# Patient Record
Sex: FEMALE | Race: ASIAN | NOT HISPANIC OR LATINO | URBAN - METROPOLITAN AREA
[De-identification: names, ages, dates, MRNs, and addresses within clinical notes are randomized per-mention and may not be internally consistent; named-entity substitution may affect disease eponyms.]

---

## 2017-08-28 ENCOUNTER — INPATIENT (INPATIENT)
Facility: HOSPITAL | Age: 42
LOS: 3 days | Discharge: HOME | DRG: 872 | End: 2017-09-01
Attending: HOSPITALIST | Admitting: INTERNAL MEDICINE
Payer: COMMERCIAL

## 2017-08-28 VITALS
HEART RATE: 107 BPM | OXYGEN SATURATION: 100 % | RESPIRATION RATE: 16 BRPM | TEMPERATURE: 100 F | DIASTOLIC BLOOD PRESSURE: 71 MMHG | WEIGHT: 130.07 LBS | SYSTOLIC BLOOD PRESSURE: 107 MMHG

## 2017-08-28 DIAGNOSIS — Z29.9 ENCOUNTER FOR PROPHYLACTIC MEASURES, UNSPECIFIED: ICD-10-CM

## 2017-08-28 DIAGNOSIS — N12 TUBULO-INTERSTITIAL NEPHRITIS, NOT SPECIFIED AS ACUTE OR CHRONIC: ICD-10-CM

## 2017-08-28 DIAGNOSIS — E66.3 OVERWEIGHT: ICD-10-CM

## 2017-08-28 DIAGNOSIS — A41.9 SEPSIS, UNSPECIFIED ORGANISM: ICD-10-CM

## 2017-08-28 LAB
ALBUMIN SERPL ELPH-MCNC: 2.5 G/DL — LOW (ref 3.3–5)
ALP SERPL-CCNC: 103 U/L — SIGNIFICANT CHANGE UP (ref 40–120)
ALT FLD-CCNC: 31 U/L — SIGNIFICANT CHANGE UP (ref 12–78)
AMYLASE P1 CFR SERPL: 20 U/L — LOW (ref 25–115)
ANION GAP SERPL CALC-SCNC: 11 MMOL/L — SIGNIFICANT CHANGE UP (ref 5–17)
APPEARANCE UR: ABNORMAL
AST SERPL-CCNC: 21 U/L — SIGNIFICANT CHANGE UP (ref 15–37)
BILIRUB SERPL-MCNC: 0.3 MG/DL — SIGNIFICANT CHANGE UP (ref 0.2–1.2)
BILIRUB UR-MCNC: NEGATIVE — SIGNIFICANT CHANGE UP
BUN SERPL-MCNC: 6 MG/DL — LOW (ref 7–23)
CALCIUM SERPL-MCNC: 8.2 MG/DL — LOW (ref 8.5–10.1)
CHLORIDE SERPL-SCNC: 102 MMOL/L — SIGNIFICANT CHANGE UP (ref 96–108)
CK MB BLD-MCNC: <0.6 % — SIGNIFICANT CHANGE UP (ref 0–3.5)
CK MB CFR SERPL CALC: <0.5 NG/ML — SIGNIFICANT CHANGE UP (ref 0–3.6)
CK SERPL-CCNC: 79 U/L — SIGNIFICANT CHANGE UP (ref 26–192)
CO2 SERPL-SCNC: 23 MMOL/L — SIGNIFICANT CHANGE UP (ref 22–31)
COLOR SPEC: YELLOW — SIGNIFICANT CHANGE UP
CREAT SERPL-MCNC: 0.99 MG/DL — SIGNIFICANT CHANGE UP (ref 0.5–1.3)
DIFF PNL FLD: ABNORMAL
EOSINOPHIL NFR BLD AUTO: 1 % — SIGNIFICANT CHANGE UP (ref 0–6)
GLUCOSE SERPL-MCNC: 116 MG/DL — HIGH (ref 70–99)
GLUCOSE UR QL: NEGATIVE — SIGNIFICANT CHANGE UP
HCG SERPL-ACNC: <1 MIU/ML — SIGNIFICANT CHANGE UP
HCT VFR BLD CALC: 30.3 % — LOW (ref 34.5–45)
HCT VFR BLD CALC: 30.4 % — LOW (ref 34.5–45)
HGB BLD-MCNC: 10.2 G/DL — LOW (ref 11.5–15.5)
HGB BLD-MCNC: 10.2 G/DL — LOW (ref 11.5–15.5)
KETONES UR-MCNC: NEGATIVE — SIGNIFICANT CHANGE UP
LACTATE SERPL-SCNC: 1.6 MMOL/L — SIGNIFICANT CHANGE UP (ref 0.7–2)
LEUKOCYTE ESTERASE UR-ACNC: ABNORMAL
LIDOCAIN IGE QN: 119 U/L — SIGNIFICANT CHANGE UP (ref 73–393)
LYMPHOCYTES # BLD AUTO: 18 % — SIGNIFICANT CHANGE UP (ref 13–44)
MCHC RBC-ENTMCNC: 28.5 PG — SIGNIFICANT CHANGE UP (ref 27–34)
MCHC RBC-ENTMCNC: 28.7 PG — SIGNIFICANT CHANGE UP (ref 27–34)
MCHC RBC-ENTMCNC: 33.4 GM/DL — SIGNIFICANT CHANGE UP (ref 32–36)
MCHC RBC-ENTMCNC: 33.7 GM/DL — SIGNIFICANT CHANGE UP (ref 32–36)
MCV RBC AUTO: 85.1 FL — SIGNIFICANT CHANGE UP (ref 80–100)
MCV RBC AUTO: 85.4 FL — SIGNIFICANT CHANGE UP (ref 80–100)
MONOCYTES NFR BLD AUTO: 10 % — HIGH (ref 1–9)
NEUTROPHILS NFR BLD AUTO: 71 % — SIGNIFICANT CHANGE UP (ref 43–77)
NITRITE UR-MCNC: NEGATIVE — SIGNIFICANT CHANGE UP
PH UR: 8 — SIGNIFICANT CHANGE UP (ref 5–8)
PLATELET # BLD AUTO: 354 K/UL — SIGNIFICANT CHANGE UP (ref 150–400)
PLATELET # BLD AUTO: 357 K/UL — SIGNIFICANT CHANGE UP (ref 150–400)
POTASSIUM SERPL-MCNC: 4.4 MMOL/L — SIGNIFICANT CHANGE UP (ref 3.5–5.3)
POTASSIUM SERPL-SCNC: 4.4 MMOL/L — SIGNIFICANT CHANGE UP (ref 3.5–5.3)
PROCALCITONIN SERPL-MCNC: 0.14 NG/ML — HIGH (ref 0–0.04)
PROT SERPL-MCNC: 7.6 G/DL — SIGNIFICANT CHANGE UP (ref 6–8.3)
PROT UR-MCNC: NEGATIVE — SIGNIFICANT CHANGE UP
RAPID RVP RESULT: SIGNIFICANT CHANGE UP
RBC # BLD: 3.56 M/UL — LOW (ref 3.8–5.2)
RBC # BLD: 3.56 M/UL — LOW (ref 3.8–5.2)
RBC # FLD: 13.8 % — SIGNIFICANT CHANGE UP (ref 10.3–14.5)
RBC # FLD: 13.8 % — SIGNIFICANT CHANGE UP (ref 10.3–14.5)
SODIUM SERPL-SCNC: 136 MMOL/L — SIGNIFICANT CHANGE UP (ref 135–145)
SP GR SPEC: 1 — LOW (ref 1.01–1.02)
TROPONIN I SERPL-MCNC: <.015 NG/ML — SIGNIFICANT CHANGE UP (ref 0.01–0.04)
UROBILINOGEN FLD QL: NEGATIVE — SIGNIFICANT CHANGE UP
WBC # BLD: 19.9 K/UL — HIGH (ref 3.8–10.5)
WBC # BLD: 21.2 K/UL — HIGH (ref 3.8–10.5)
WBC # FLD AUTO: 19.9 K/UL — HIGH (ref 3.8–10.5)
WBC # FLD AUTO: 21.2 K/UL — HIGH (ref 3.8–10.5)

## 2017-08-28 PROCEDURE — 99223 1ST HOSP IP/OBS HIGH 75: CPT | Mod: AI,GC

## 2017-08-28 PROCEDURE — 93010 ELECTROCARDIOGRAM REPORT: CPT

## 2017-08-28 PROCEDURE — 71260 CT THORAX DX C+: CPT | Mod: 26

## 2017-08-28 PROCEDURE — 71010: CPT | Mod: 26

## 2017-08-28 PROCEDURE — 74177 CT ABD & PELVIS W/CONTRAST: CPT | Mod: 26

## 2017-08-28 PROCEDURE — 99285 EMERGENCY DEPT VISIT HI MDM: CPT

## 2017-08-28 RX ORDER — SODIUM CHLORIDE 9 MG/ML
1000 INJECTION INTRAMUSCULAR; INTRAVENOUS; SUBCUTANEOUS
Qty: 0 | Refills: 0 | Status: DISCONTINUED | OUTPATIENT
Start: 2017-08-28 | End: 2017-08-28

## 2017-08-28 RX ORDER — SODIUM CHLORIDE 9 MG/ML
1000 INJECTION INTRAMUSCULAR; INTRAVENOUS; SUBCUTANEOUS ONCE
Qty: 0 | Refills: 0 | Status: COMPLETED | OUTPATIENT
Start: 2017-08-28 | End: 2017-08-28

## 2017-08-28 RX ORDER — ONDANSETRON 8 MG/1
4 TABLET, FILM COATED ORAL ONCE
Qty: 0 | Refills: 0 | Status: COMPLETED | OUTPATIENT
Start: 2017-08-28 | End: 2017-08-28

## 2017-08-28 RX ORDER — CEFTRIAXONE 500 MG/1
1 INJECTION, POWDER, FOR SOLUTION INTRAMUSCULAR; INTRAVENOUS EVERY 24 HOURS
Qty: 0 | Refills: 0 | Status: DISCONTINUED | OUTPATIENT
Start: 2017-08-29 | End: 2017-08-31

## 2017-08-28 RX ORDER — ONDANSETRON 8 MG/1
4 TABLET, FILM COATED ORAL EVERY 6 HOURS
Qty: 0 | Refills: 0 | Status: DISCONTINUED | OUTPATIENT
Start: 2017-08-28 | End: 2017-09-01

## 2017-08-28 RX ORDER — ACETAMINOPHEN 500 MG
650 TABLET ORAL ONCE
Qty: 0 | Refills: 0 | Status: COMPLETED | OUTPATIENT
Start: 2017-08-28 | End: 2017-08-28

## 2017-08-28 RX ORDER — ACETAMINOPHEN 500 MG
650 TABLET ORAL EVERY 6 HOURS
Qty: 0 | Refills: 0 | Status: DISCONTINUED | OUTPATIENT
Start: 2017-08-28 | End: 2017-09-01

## 2017-08-28 RX ORDER — CEFTRIAXONE 500 MG/1
1 INJECTION, POWDER, FOR SOLUTION INTRAMUSCULAR; INTRAVENOUS ONCE
Qty: 0 | Refills: 0 | Status: COMPLETED | OUTPATIENT
Start: 2017-08-28 | End: 2017-08-28

## 2017-08-28 RX ORDER — ENOXAPARIN SODIUM 100 MG/ML
40 INJECTION SUBCUTANEOUS EVERY 24 HOURS
Qty: 0 | Refills: 0 | Status: DISCONTINUED | OUTPATIENT
Start: 2017-08-28 | End: 2017-09-01

## 2017-08-28 RX ORDER — SODIUM CHLORIDE 9 MG/ML
3 INJECTION INTRAMUSCULAR; INTRAVENOUS; SUBCUTANEOUS ONCE
Qty: 0 | Refills: 0 | Status: COMPLETED | OUTPATIENT
Start: 2017-08-28 | End: 2017-08-28

## 2017-08-28 RX ORDER — ONDANSETRON 8 MG/1
4 TABLET, FILM COATED ORAL EVERY 6 HOURS
Qty: 0 | Refills: 0 | Status: DISCONTINUED | OUTPATIENT
Start: 2017-08-28 | End: 2017-08-28

## 2017-08-28 RX ORDER — SODIUM CHLORIDE 9 MG/ML
1000 INJECTION INTRAMUSCULAR; INTRAVENOUS; SUBCUTANEOUS
Qty: 0 | Refills: 0 | Status: DISCONTINUED | OUTPATIENT
Start: 2017-08-28 | End: 2017-09-01

## 2017-08-28 RX ADMIN — Medication 650 MILLIGRAM(S): at 13:16

## 2017-08-28 RX ADMIN — SODIUM CHLORIDE 3 MILLILITER(S): 9 INJECTION INTRAMUSCULAR; INTRAVENOUS; SUBCUTANEOUS at 13:14

## 2017-08-28 RX ADMIN — SODIUM CHLORIDE 1000 MILLILITER(S): 9 INJECTION INTRAMUSCULAR; INTRAVENOUS; SUBCUTANEOUS at 18:42

## 2017-08-28 RX ADMIN — SODIUM CHLORIDE 120 MILLILITER(S): 9 INJECTION INTRAMUSCULAR; INTRAVENOUS; SUBCUTANEOUS at 22:05

## 2017-08-28 RX ADMIN — SODIUM CHLORIDE 1000 MILLILITER(S): 9 INJECTION INTRAMUSCULAR; INTRAVENOUS; SUBCUTANEOUS at 16:30

## 2017-08-28 RX ADMIN — SODIUM CHLORIDE 1000 MILLILITER(S): 9 INJECTION INTRAMUSCULAR; INTRAVENOUS; SUBCUTANEOUS at 12:46

## 2017-08-28 RX ADMIN — ONDANSETRON 4 MILLIGRAM(S): 8 TABLET, FILM COATED ORAL at 20:04

## 2017-08-28 RX ADMIN — CEFTRIAXONE 100 GRAM(S): 500 INJECTION, POWDER, FOR SOLUTION INTRAMUSCULAR; INTRAVENOUS at 18:42

## 2017-08-28 RX ADMIN — Medication 650 MILLIGRAM(S): at 20:04

## 2017-08-28 NOTE — H&P ADULT - PROBLEM SELECTOR PLAN 3
- Pt overweight and family Hx of DM, r/o metabolic syndrome  - F/u Hgb A1c  - f/u lipid panel  - f/u TSH - Pt overweight and family Hx of DM, r/o metabolic syndrome  - F/u Hgb A1c  - f/u lipid panel and TSH  - f/u TSH

## 2017-08-28 NOTE — H&P ADULT - ASSESSMENT
41 y/o F with no significant PMH presents to the ED with fever x 6 days, admitted for sepsis 2/2 pyleonephritis.

## 2017-08-28 NOTE — ED ADULT NURSE NOTE - OBJECTIVE STATEMENT
43yo female presents to ED alert and oriented X4. steady gait noted.   patient c/o intermittent headache, cough and fever for 5 days. patient c/o chest pain when she coughs, ekg performed and reviewed by Dr. Mccloud.  patient denies shortness of breath, blurred vision, dizziness, palpitations, neck pain, nausea, vomiting, diarrhea, abdominal pain.  respirations even and unlabored. b/l lungs clear.  NSR on continuos cardiac monitor.

## 2017-08-28 NOTE — H&P ADULT - HISTORY OF PRESENT ILLNESS
43 y/o F with no significant PMH 43 y/o F with no significant PMH presents to the ED with fever x 6 days. She has been having fevers intermittently over the past 6 daysas high as 101F, for which she has been taking advil/motrin. She notes also having shaking chills, cough, generalized CP which only occurs when coughing, and HA that began about the same time that the fevers started. Pt denies sick contacts at home. In ROS pt also admits to having slight dyuria, burning, and increased urgency with urination, nausea, and vomiting x1 in ED. Pt notes having recurrent UTIs, occur about every 6 months for the past several years. She has also had decreased appetite with poor po intake.    In the ED Pt VS 98.4F, HR 88m BP 99/64, RR16, SpO2 99 on RA. On recheck temp spiked to 101.9F. CBC showed WBC 21.2, Hgb 10.2. CMP elevated glucose of 116. Procal 0.14, Lactate WNL. Trops neg x1. UA positive. RVP negative. CXR negative for lung pathology. CT chest negative for intrathoracic path. CT abd/pelvis positive for pyleonephritis. Pt received Ceftriaxone 1g IV x2, NS 3L bolus, Tylenol x2 for fever, zofran IVpush for N x1. Blood and urine cultures drawn.

## 2017-08-28 NOTE — H&P ADULT - PROBLEM SELECTOR PLAN 2
- 2/2 pyelonephritis  - WBC 21.2, pt febrile in ED  - Lactate WNL  - Procalcitonin slightly elevated at 0.14  - RVP negative  - Pt on ceftriaxone IV  - 3L NS bolus in ED  - On NS@120cc for maintenance fluids  - F/u Urine Cx   - F/u Blood Cx

## 2017-08-28 NOTE — H&P ADULT - NSHPPHYSICALEXAM_GEN_ALL_CORE
Physical Exam:  General: Well developed, well nourished, NAD, overweight  HEENT: NCAT, EOMI bl, moist mucous membranes   Neck: Supple, nontender  Neurology: A&Ox3  Respiratory: CTA B/L, No W/R/R  CV: RRR, +S1/S2, no murmurs, rubs or gallops  Abdominal: Soft, NT, ND +BSx4; Positive: slight suprapubic tenderness on palpation  Extremities: No C/C/E, +peripheral pulses  MSK: Positive: R flank tenderness to palpation  Skin: warm, dry, normal color, no rash or abnormal lesions

## 2017-08-28 NOTE — H&P ADULT - FAMILY HISTORY
Mother  Still living? Unknown  Family history of diabetes mellitus in first degree relative, Age at diagnosis: Age Unknown     Sibling  Still living? Yes, Estimated age: Age Unknown  Family history of diabetes mellitus in first degree relative, Age at diagnosis: Age Unknown

## 2017-08-28 NOTE — H&P ADULT - NSHPREVIEWOFSYSTEMS_GEN_ALL_CORE
Constitutional: denies diaphoresis; positive: fever, chills  HEENT: denies blurry vision  Respiratory: denies SOB, sputum production; positive: cough  Cardiovascular: denies palpitations; Positive: CP only when coughing  Gastrointestinal: denies diarrhea, constipation, abdominal pain, melena, hematochezia; Positive: Nausea and vomiting x1 in ED  Genitourinary: denies frequency, hematuria; Positive: dysuria, urgency, burning  Skin/Breast: denies rash  Musculoskeletal: denies myalgias  Neurologic: denies weakness, dizziness; Positive: HA  Psychiatric: denies feeling anxious, depressed  Hematology/Oncology: denies bruising  ROS negative except as noted above

## 2017-08-28 NOTE — H&P ADULT - PROBLEM SELECTOR PLAN 1
- Abd/pelvis CT positive for pyleonephritis, UA+  - Admit pt to [facility]  - On NS@120cc for maintenance fluids  - On Ceftriaxone IV  - Zofran IV push prn for nausea  - Tylenol prn for fever  - Naproxen prn for pain  - F/u Urine Cx   - F/u Blood Cx  - F/u AM labs

## 2017-08-28 NOTE — ED PROVIDER NOTE - OBJECTIVE STATEMENT
Pt is a 41 yo female who presents to the ED with a cc of fever.  Pt reports that she has been feeling ill since Wednesday with associated fevers T max of 103, 104.  Pt further reports associated frontal HA, non-productive cough, and several episodes of loose stools.  When she takes Advil the fever and HA seem to resolve but it returns when the medication wears off.  Denies blurry vision, N/V/C, CP, SOB, abd pain.  LMP 10 days ago.  Denies sick contact.  She has not followed up with her PMD for this.  Pt has no known medical issues and is on no medications

## 2017-08-28 NOTE — H&P ADULT - NSHPSOCIALHISTORY_GEN_ALL_CORE
Lives in house with  and 3 children  Ambulates independently  Smoking: denies  EtOH: denies  Drugs: Denies  Mammogram: never had  Pap: last 5 years ago, normal  Vaccination history: pt doesn't remember

## 2017-08-28 NOTE — H&P ADULT - ATTENDING COMMENTS
43 y/o F with no significant PMH presents to the ED with fever x 6 days, admitted for sepsis 2/2 pyleonephritis , c/w iv cefriaxone , follow up labs and cultures.

## 2017-08-29 LAB
ANION GAP SERPL CALC-SCNC: 12 MMOL/L — SIGNIFICANT CHANGE UP (ref 5–17)
BUN SERPL-MCNC: 4 MG/DL — LOW (ref 7–23)
CALCIUM SERPL-MCNC: 7.4 MG/DL — LOW (ref 8.5–10.1)
CHLORIDE SERPL-SCNC: 107 MMOL/L — SIGNIFICANT CHANGE UP (ref 96–108)
CHOLEST SERPL-MCNC: 111 MG/DL — SIGNIFICANT CHANGE UP (ref 10–199)
CO2 SERPL-SCNC: 21 MMOL/L — LOW (ref 22–31)
CREAT SERPL-MCNC: 0.84 MG/DL — SIGNIFICANT CHANGE UP (ref 0.5–1.3)
CULTURE RESULTS: SIGNIFICANT CHANGE UP
GLUCOSE SERPL-MCNC: 94 MG/DL — SIGNIFICANT CHANGE UP (ref 70–99)
HBA1C BLD-MCNC: 5.4 % — SIGNIFICANT CHANGE UP (ref 4–5.6)
HCT VFR BLD CALC: 28.7 % — LOW (ref 34.5–45)
HDLC SERPL-MCNC: 20 MG/DL — LOW (ref 40–125)
HGB BLD-MCNC: 9.3 G/DL — LOW (ref 11.5–15.5)
LIPID PNL WITH DIRECT LDL SERPL: 75 MG/DL — SIGNIFICANT CHANGE UP
MCHC RBC-ENTMCNC: 27.7 PG — SIGNIFICANT CHANGE UP (ref 27–34)
MCHC RBC-ENTMCNC: 32.4 GM/DL — SIGNIFICANT CHANGE UP (ref 32–36)
MCV RBC AUTO: 85.4 FL — SIGNIFICANT CHANGE UP (ref 80–100)
PLATELET # BLD AUTO: 340 K/UL — SIGNIFICANT CHANGE UP (ref 150–400)
POTASSIUM SERPL-MCNC: 3.5 MMOL/L — SIGNIFICANT CHANGE UP (ref 3.5–5.3)
POTASSIUM SERPL-SCNC: 3.5 MMOL/L — SIGNIFICANT CHANGE UP (ref 3.5–5.3)
RBC # BLD: 3.37 M/UL — LOW (ref 3.8–5.2)
RBC # FLD: 13.6 % — SIGNIFICANT CHANGE UP (ref 10.3–14.5)
SODIUM SERPL-SCNC: 140 MMOL/L — SIGNIFICANT CHANGE UP (ref 135–145)
SPECIMEN SOURCE: SIGNIFICANT CHANGE UP
TOTAL CHOLESTEROL/HDL RATIO MEASUREMENT: 5.6 RATIO — SIGNIFICANT CHANGE UP (ref 3.3–7.1)
TRIGL SERPL-MCNC: 80 MG/DL — SIGNIFICANT CHANGE UP (ref 10–149)
TSH SERPL-MCNC: 1.01 UIU/ML — SIGNIFICANT CHANGE UP (ref 0.36–3.74)
WBC # BLD: 18.5 K/UL — HIGH (ref 3.8–10.5)
WBC # FLD AUTO: 18.5 K/UL — HIGH (ref 3.8–10.5)

## 2017-08-29 PROCEDURE — 99233 SBSQ HOSP IP/OBS HIGH 50: CPT

## 2017-08-29 RX ADMIN — SODIUM CHLORIDE 120 MILLILITER(S): 9 INJECTION INTRAMUSCULAR; INTRAVENOUS; SUBCUTANEOUS at 17:39

## 2017-08-29 RX ADMIN — ONDANSETRON 4 MILLIGRAM(S): 8 TABLET, FILM COATED ORAL at 06:31

## 2017-08-29 RX ADMIN — Medication 250 MILLIGRAM(S): at 06:31

## 2017-08-29 RX ADMIN — SODIUM CHLORIDE 120 MILLILITER(S): 9 INJECTION INTRAMUSCULAR; INTRAVENOUS; SUBCUTANEOUS at 06:23

## 2017-08-29 RX ADMIN — Medication 250 MILLIGRAM(S): at 17:05

## 2017-08-29 RX ADMIN — Medication 250 MILLIGRAM(S): at 07:23

## 2017-08-29 RX ADMIN — CEFTRIAXONE 100 GRAM(S): 500 INJECTION, POWDER, FOR SOLUTION INTRAMUSCULAR; INTRAVENOUS at 17:32

## 2017-08-29 RX ADMIN — Medication 250 MILLIGRAM(S): at 16:06

## 2017-08-29 NOTE — PROVIDER CONTACT NOTE (OTHER) - SITUATION
Pt noted with facial paralysis and slurred speech at times very difficult to understand. Intern on call  call and on unit assessing patient.

## 2017-08-29 NOTE — PROGRESS NOTE ADULT - PROBLEM SELECTOR PLAN 3
- Pt overweight and family Hx of DM, r/o metabolic syndrome  - F/u Hgb A1c  - f/u lipid panel and TSH  - f/u TSH

## 2017-08-29 NOTE — PROGRESS NOTE ADULT - PROBLEM SELECTOR PLAN 1
- Abd/pelvis CT positive for pyleonephritis, UA+  - On NS@120cc for maintenance fluids  - On Ceftriaxone IV  - Zofran IV push prn for nausea  - Tylenol prn for fever  - Naproxen prn for pain  - F/u Urine Cx   - F/u Blood Cx  - F/u AM labs

## 2017-08-29 NOTE — CONSULT NOTE ADULT - SUBJECTIVE AND OBJECTIVE BOX
Patient is a 42y old  Female who presents with a chief complaint of fever (28 Aug 2017 20:59)      HPI:  43 y/o F with no significant PMH presents to the ED with fever x 6 days. She has been having fevers intermittently over the past 6 days as high as 101F, for which she has been taking advil/motrin. She notes also having shaking chills, cough, generalized CP which only occurs when coughing, and HA that began about the same time that the fevers started. Pt denies sick contacts at home. In ROS pt also admits to having slight dyuria, burning, and increased urgency with urination, nausea, and vomiting x1 in ED. Pt notes having recurrent UTIs, occur about every 6 months for the past several years. She has also had decreased appetite with poor po intake.    In the ED Pt VS 98.4F, HR 88m BP 99/64, RR16, SpO2 99 on RA. On recheck temp spiked to 101.9F. CBC showed WBC 21.2, Hgb 10.2. CMP elevated glucose of 116. Procal 0.14, Lactate WNL. Trops neg x1. UA positive. RVP negative. CXR negative for lung pathology. CT chest negative for intrathoracic path. CT abd/pelvis positive for pyleonephritis. Pt received Ceftriaxone 1g IV x2, NS 3L bolus, Tylenol x2 for fever, zofran IVpush for N x1. Blood and urine cultures drawn. (28 Aug 2017 20:59)      Asked to see patient for ID Consult    Allergies    No Known Allergies    Intolerances        MEDICATIONS  (STANDING):  cefTRIAXone   IVPB 1 Gram(s) IV Intermittent every 24 hours  sodium chloride 0.9%. 1000 milliLiter(s) (120 mL/Hr) IV Continuous <Continuous>  enoxaparin Injectable 40 milliGRAM(s) SubCutaneous every 24 hours    MEDICATIONS  (PRN):  acetaminophen   Tablet 650 milliGRAM(s) Oral every 6 hours PRN For Temp greater than 38 C (100.4 F)  naproxen 250 milliGRAM(s) Oral two times a day PRN mild/moderate pain  ondansetron Injectable 4 milliGRAM(s) IV Push every 6 hours PRN Nausea and/or Vomiting      PAST MEDICAL & SURGICAL HISTORY:  No pertinent past medical history  No significant past surgical history      FAMILY HISTORY:  Family history of diabetes mellitus in first degree relative (Mother, Sibling): Mother and Sister      Social History    Smoking History:  YES[  ]  NO[ x ]   UNKNOWN[  ]    REVIEW OF SYSTEMS:  All systems below were reviewed and are normal [  ]  Constitutional:  HEENT:  Lymph nodes:  ID:  Pulmonary:+ dry cough-ant. CP only with cough. no sob  Cardiac:  GI:no NVD abd pain  Renal:no dysuria or freq.  Musculoskeletal:  Neuro:  Endocrine:  Skin:  All other systems above were reviewed and are normal   [x  ]    HOME MEDICATIONS:    MEDICATIONS  (STANDING):  cefTRIAXone   IVPB 1 Gram(s) IV Intermittent every 24 hours  sodium chloride 0.9%. 1000 milliLiter(s) (120 mL/Hr) IV Continuous <Continuous>  enoxaparin Injectable 40 milliGRAM(s) SubCutaneous every 24 hours    MEDICATIONS  (PRN):  acetaminophen   Tablet 650 milliGRAM(s) Oral every 6 hours PRN For Temp greater than 38 C (100.4 F)  naproxen 250 milliGRAM(s) Oral two times a day PRN mild/moderate pain  ondansetron Injectable 4 milliGRAM(s) IV Push every 6 hours PRN Nausea and/or Vomiting        Vital Signs Last 24 Hrs  T(C): 36.3 (29 Aug 2017 05:18), Max: 37.6 (28 Aug 2017 12:12)  T(F): 97.4 (29 Aug 2017 05:18), Max: 99.6 (28 Aug 2017 12:12)  HR: 89 (29 Aug 2017 05:18) (82 - 107)  BP: 96/64 (29 Aug 2017 05:18) (96/64 - 107/71)  BP(mean): --  RR: 16 (29 Aug 2017 05:18) (16 - 18)  SpO2: 98% (29 Aug 2017 05:18) (98% - 100%)    PHYSICAL EXAM:  Constitutional:  HEENT:  Neck:  Lymph Nodes:  Lungs:clear  Heart:rr  Abdomen:soft nontender. no tenderness over bladder  Renal:+ R CVAT  Extremities:  Neurologic:  Vascular:  Endocrine:  Skin:  Except for written comments, all of above normal [ x ]    I&O's Summary    29 Aug 2017 07:01  -  29 Aug 2017 08:15  --------------------------------------------------------  IN: 1320 mL / OUT: 0 mL / NET: 1320 mL        LABORATORY:    CBC Full  -  ( 28 Aug 2017 18:48 )  WBC Count : 19.9 K/uL  Hemoglobin : 10.2 g/dL  Hematocrit : 30.4 %  Platelet Count - Automated : 357 K/uL  Mean Cell Volume : 85.4 fl  Mean Cell Hemoglobin : 28.5 pg  Mean Cell Hemoglobin Concentration : 33.4 gm/dL  Auto Neutrophil # : x  Auto Lymphocyte # : x  Auto Monocyte # : x  Auto Eosinophil # : x  Auto Basophil # : x  Auto Neutrophil % : x  Auto Lymphocyte % : x  Auto Monocyte % : x  Auto Eosinophil % : x  Auto Basophil % : x          136  |  102  |  6<L>  ----------------------------<  116<H>  4.4   |  23  |  0.99    Ca    8.2<L>      28 Aug 2017 13:10    TPro  7.6  /  Alb  2.5<L>  /  TBili  0.3  /  DBili  x   /  AST  21  /  ALT  31  /  AlkPhos  103        Urinalysis Basic - ( 28 Aug 2017 13:31 )    Color: Yellow / Appearance: x / S.005 / pH: x  Gluc: x / Ketone: Negative  / Bili: Negative / Urobili: Negative   Blood: x / Protein: Negative / Nitrite: Negative   Leuk Esterase: Moderate / RBC: 0-2 /HPF / WBC 6-10   Sq Epi: x / Non Sq Epi: x / Bacteria: Few        LABORATORY:    CBC Full  -  ( 28 Aug 2017 18:48 )  WBC Count : 19.9 K/uL  Hemoglobin : 10.2 g/dL  Hematocrit : 30.4 %  Platelet Count - Automated : 357 K/uL  Mean Cell Volume : 85.4 fl  Mean Cell Hemoglobin : 28.5 pg  Mean Cell Hemoglobin Concentration : 33.4 gm/dL  Auto Neutrophil # : x  Auto Lymphocyte # : x  Auto Monocyte # : x  Auto Eosinophil # : x  Auto Basophil # : x  Auto Neutrophil % : x  Auto Lymphocyte % : x  Auto Monocyte % : x  Auto Eosinophil % : x  Auto Basophil % : x        ESR:                    @ 13:10  --    C-Reactive Protein:      @ 13:10  --    Procalcitonin:            @ 13:10   0.14          136  |  102  |  6<L>  ----------------------------<  116<H>  4.4   |  23  |  0.99    Ca    8.2<L>      28 Aug 2017 13:10    TPro  7.6  /  Alb  2.5<L>  /  TBili  0.3  /  DBili  x   /  AST  21  /  ALT  31  /  AlkPhos  103                                              Rapid Respiratory Viral Panel Result         @ 14:34  Rapid RVP [facility]  Coronovirus --  Adenovirus --  Bordetella Pertussis --  Chlamydia Pneumonia --  Entero/Rhinovirus--  HKU1 Coronovirus --  HMPV Coronovirus --  Influenza A --  Influenza AH1 --  Influenza AH1 2009 --  Influenza AH3 --  Influenza B --  Mycoplasma Pneumoniae --  NL63 Coronovirus --  OC43 Coronovirus --  Parainfluenza 1 --  Parainfluenza 2 --  Parainfluenza 3 --  Parainfluenza 4 --  Resp Syncytial Virus --          Vanco. trough:  Genta trough:    Radiology:< from: Xray Chest 1 View AP- PORTABLE-Urgent (17 @ 13:49) >  EXAM:  PORTABLE CHEST URGENT                            PROCEDURE DATE:  2017          INTERPRETATION:  CLINICAL INDICATION: Cough and shortness of breath    Portable frontal view of the chest is submitted.    COMPARISON: None    FINDINGS: Cardiac silhouette is within normal limits of size. Lungs are   clear. Costophrenic angles are sharp. The osseous structures are grossly   unremarkable.    IMPRESSION:     Clear lungs.                PHILLIP CARLIN M.D., ATTENDING RADIOLOGIST  This document has been electronically signed. Aug 28 2017  1:59PM                < end of copied text >    < from: CT Abdomen and Pelvis w/ IV Cont (17 @ 17:15) >    EXAM:  CT ABDOMEN AND PELVIS IC                          EXAM:  CT CHEST IC                            *** ADDENDUM 2017  ***    Small right iliopsoas bursitis noted.      *** END OF ADDENDUM 2017  ***      PROCEDURE DATE:  2017          INTERPRETATION:  CLINICAL INFORMATION: Fever and cough. Abdominal pain   and nausea.    COMPARISON: None    PROCEDURE:   CT of the Chest, Abdomen and Pelvis was performed with intravenous   contrast.   Intravenous contrast: 90 ml Omnipaque 350. 10 ml discarded.  Oral contrast: positive contrast was administered.  Sagittal and coronal reformats were performed. Axial MIPS are provided.    FINDINGS:    CHEST:     LUNGS AND LARGE AIRWAYS: Patent central airways.No pulmonary   consolidation or nodules.  PLEURA: No pleural effusion.  VESSELS: Within normal limits.  HEART: Heart size is normal.No pericardial effusion.  MEDIASTINUM AND FEMI: No lymphadenopathy.  CHEST WALL AND LOWER NECK: Within normal limits.    ABDOMEN AND PELVIS: Images of the abdomen and pelvis are limited by   patient motion.    LIVER: Normal attenuation.  BILE DUCTS: Normal caliber.  GALLBLADDER: Within normal limits.  SPLEEN: Normal attenuation.  PANCREAS: Grossly within normal limits.  ADRENALS: Within normal limits.  KIDNEYS/URETERS: Bilateral striated nephrograms. No fluid collections or   hydronephrosis. Mild left-sided urothelial enhancement    BLADDER: Distended urinary bladder.  REPRODUCTIVE ORGANS: The uterus and adnexa are within normal limits.    BOWEL: No bowel obstruction. Appendix normal caliber.  PERITONEUM: No ascites.  VESSELS:  Within normal limits.  RETROPERITONEUM: No lymphadenopathy.    ABDOMINAL WALL: Within normal limits.  BONES: Within normal limits.    IMPRESSION:     No acute thoracic pathology.    Motion limited CT of the abdomen and pelvis.   Bilateral striated nephrograms suggest pyelonephritis. No evidence for   drainable liquid collection or hydronephrosis. Urinalysis correlation   recommended.          ***Please see the addendum at the top of this report. It may contain   additional important information or changes.****          PHILLIP CARLIN M.D., ATTENDING RADIOLOGIST  This document has been electronically signed. Aug 28 2017  5:28PM  Addend:  PHILLIP CARLIN M.D., ATTENDING RADIOLOGIST  This addendum was electronically signed on: Aug 28 2017  5:33PM.    < end of copied text >    Microbiology:bl cx and ur cx pending

## 2017-08-30 ENCOUNTER — TRANSCRIPTION ENCOUNTER (OUTPATIENT)
Age: 42
End: 2017-08-30

## 2017-08-30 DIAGNOSIS — E87.6 HYPOKALEMIA: ICD-10-CM

## 2017-08-30 LAB
ANION GAP SERPL CALC-SCNC: 8 MMOL/L — SIGNIFICANT CHANGE UP (ref 5–17)
BUN SERPL-MCNC: 7 MG/DL — SIGNIFICANT CHANGE UP (ref 7–23)
CALCIUM SERPL-MCNC: 8.3 MG/DL — LOW (ref 8.5–10.1)
CHLORIDE SERPL-SCNC: 106 MMOL/L — SIGNIFICANT CHANGE UP (ref 96–108)
CO2 SERPL-SCNC: 27 MMOL/L — SIGNIFICANT CHANGE UP (ref 22–31)
CREAT SERPL-MCNC: 0.84 MG/DL — SIGNIFICANT CHANGE UP (ref 0.5–1.3)
GLUCOSE SERPL-MCNC: 91 MG/DL — SIGNIFICANT CHANGE UP (ref 70–99)
HCT VFR BLD CALC: 27.5 % — LOW (ref 34.5–45)
HGB BLD-MCNC: 9.2 G/DL — LOW (ref 11.5–15.5)
MCHC RBC-ENTMCNC: 28.4 PG — SIGNIFICANT CHANGE UP (ref 27–34)
MCHC RBC-ENTMCNC: 33.3 GM/DL — SIGNIFICANT CHANGE UP (ref 32–36)
MCV RBC AUTO: 85.3 FL — SIGNIFICANT CHANGE UP (ref 80–100)
PLATELET # BLD AUTO: 382 K/UL — SIGNIFICANT CHANGE UP (ref 150–400)
POTASSIUM SERPL-MCNC: 3.4 MMOL/L — LOW (ref 3.5–5.3)
POTASSIUM SERPL-SCNC: 3.4 MMOL/L — LOW (ref 3.5–5.3)
RBC # BLD: 3.23 M/UL — LOW (ref 3.8–5.2)
RBC # FLD: 13.7 % — SIGNIFICANT CHANGE UP (ref 10.3–14.5)
SODIUM SERPL-SCNC: 141 MMOL/L — SIGNIFICANT CHANGE UP (ref 135–145)
WBC # BLD: 15.7 K/UL — HIGH (ref 3.8–10.5)
WBC # FLD AUTO: 15.7 K/UL — HIGH (ref 3.8–10.5)

## 2017-08-30 PROCEDURE — 99233 SBSQ HOSP IP/OBS HIGH 50: CPT

## 2017-08-30 PROCEDURE — 93971 EXTREMITY STUDY: CPT | Mod: 26,RT

## 2017-08-30 PROCEDURE — 71020: CPT | Mod: 26

## 2017-08-30 RX ORDER — POTASSIUM CHLORIDE 20 MEQ
20 PACKET (EA) ORAL ONCE
Qty: 0 | Refills: 0 | Status: COMPLETED | OUTPATIENT
Start: 2017-08-30 | End: 2017-08-30

## 2017-08-30 RX ADMIN — Medication 650 MILLIGRAM(S): at 15:24

## 2017-08-30 RX ADMIN — CEFTRIAXONE 100 GRAM(S): 500 INJECTION, POWDER, FOR SOLUTION INTRAMUSCULAR; INTRAVENOUS at 17:28

## 2017-08-30 RX ADMIN — Medication 20 MILLIEQUIVALENT(S): at 11:58

## 2017-08-30 RX ADMIN — Medication 250 MILLIGRAM(S): at 22:40

## 2017-08-30 RX ADMIN — ENOXAPARIN SODIUM 40 MILLIGRAM(S): 100 INJECTION SUBCUTANEOUS at 11:58

## 2017-08-30 RX ADMIN — Medication 250 MILLIGRAM(S): at 21:40

## 2017-08-30 NOTE — PROGRESS NOTE ADULT - PROBLEM SELECTOR PLAN 3
- Pt overweight and family Hx of DM, r/o metabolic syndrome  - Hgb A1c is 5.4   - f/u lipid panel and TSH

## 2017-08-30 NOTE — DISCHARGE NOTE ADULT - MEDICATION SUMMARY - MEDICATIONS TO TAKE
I will START or STAY ON the medications listed below when I get home from the hospital:    amoxicillin-clavulanate 875 mg-125 mg oral tablet  -- 1 tab(s) by mouth 2 times a day  -- Indication: For Pyelonephritis

## 2017-08-30 NOTE — DISCHARGE NOTE ADULT - CARE PLAN
Principal Discharge DX:	Pyelonephritis  Goal:	Resolution  Instructions for follow-up, activity and diet:	Complete course of antibiotics.

## 2017-08-30 NOTE — PROGRESS NOTE ADULT - PROBLEM SELECTOR PLAN 1
- On NS@120cc for maintenance fluids  - On Ceftriaxone IV  - Zofran IV push prn for nausea  - Tylenol prn for fever  - Naproxen prn for pain  - F/u Urine Cx   - F/u Blood Cx  - F/u AM labs

## 2017-08-30 NOTE — DISCHARGE NOTE ADULT - HOSPITAL COURSE
Patient was started on IV Ceftriaxone. Symptoms improved. Seen by GEREMIAS Ta. Patient was started on IV Ceftriaxone. Symptoms improved. Seen by ID Dr. Ta. Cultures came back negative. Antibiotics switched to oral and was cleared by ID for discharge. Will f/u with PCP as out patient. Patient does not take Prednisone or any other medication at home, as per patient.

## 2017-08-31 LAB
ANION GAP SERPL CALC-SCNC: 9 MMOL/L — SIGNIFICANT CHANGE UP (ref 5–17)
BUN SERPL-MCNC: 7 MG/DL — SIGNIFICANT CHANGE UP (ref 7–23)
CALCIUM SERPL-MCNC: 8.8 MG/DL — SIGNIFICANT CHANGE UP (ref 8.5–10.1)
CHLORIDE SERPL-SCNC: 104 MMOL/L — SIGNIFICANT CHANGE UP (ref 96–108)
CO2 SERPL-SCNC: 28 MMOL/L — SIGNIFICANT CHANGE UP (ref 22–31)
CREAT SERPL-MCNC: 0.74 MG/DL — SIGNIFICANT CHANGE UP (ref 0.5–1.3)
CULTURE RESULTS: SIGNIFICANT CHANGE UP
GLUCOSE SERPL-MCNC: 82 MG/DL — SIGNIFICANT CHANGE UP (ref 70–99)
HCT VFR BLD CALC: 28.7 % — LOW (ref 34.5–45)
HGB BLD-MCNC: 9.4 G/DL — LOW (ref 11.5–15.5)
MCHC RBC-ENTMCNC: 27.6 PG — SIGNIFICANT CHANGE UP (ref 27–34)
MCHC RBC-ENTMCNC: 32.6 GM/DL — SIGNIFICANT CHANGE UP (ref 32–36)
MCV RBC AUTO: 84.6 FL — SIGNIFICANT CHANGE UP (ref 80–100)
PLATELET # BLD AUTO: 449 K/UL — HIGH (ref 150–400)
POTASSIUM SERPL-MCNC: 3.7 MMOL/L — SIGNIFICANT CHANGE UP (ref 3.5–5.3)
POTASSIUM SERPL-SCNC: 3.7 MMOL/L — SIGNIFICANT CHANGE UP (ref 3.5–5.3)
RBC # BLD: 3.39 M/UL — LOW (ref 3.8–5.2)
RBC # FLD: 13.5 % — SIGNIFICANT CHANGE UP (ref 10.3–14.5)
SODIUM SERPL-SCNC: 141 MMOL/L — SIGNIFICANT CHANGE UP (ref 135–145)
SPECIMEN SOURCE: SIGNIFICANT CHANGE UP
WBC # BLD: 11.8 K/UL — HIGH (ref 3.8–10.5)
WBC # FLD AUTO: 11.8 K/UL — HIGH (ref 3.8–10.5)

## 2017-08-31 PROCEDURE — 99233 SBSQ HOSP IP/OBS HIGH 50: CPT

## 2017-08-31 RX ORDER — PIPERACILLIN AND TAZOBACTAM 4; .5 G/20ML; G/20ML
3.38 INJECTION, POWDER, LYOPHILIZED, FOR SOLUTION INTRAVENOUS EVERY 8 HOURS
Qty: 0 | Refills: 0 | Status: DISCONTINUED | OUTPATIENT
Start: 2017-08-31 | End: 2017-09-01

## 2017-08-31 RX ORDER — DIPHENHYDRAMINE HCL 50 MG
50 CAPSULE ORAL ONCE
Qty: 0 | Refills: 0 | Status: COMPLETED | OUTPATIENT
Start: 2017-08-31 | End: 2017-08-31

## 2017-08-31 RX ADMIN — Medication 250 MILLIGRAM(S): at 05:19

## 2017-08-31 RX ADMIN — Medication 100 MILLIGRAM(S): at 00:13

## 2017-08-31 RX ADMIN — ENOXAPARIN SODIUM 40 MILLIGRAM(S): 100 INJECTION SUBCUTANEOUS at 12:38

## 2017-08-31 RX ADMIN — ONDANSETRON 4 MILLIGRAM(S): 8 TABLET, FILM COATED ORAL at 22:10

## 2017-08-31 RX ADMIN — PIPERACILLIN AND TAZOBACTAM 25 GRAM(S): 4; .5 INJECTION, POWDER, LYOPHILIZED, FOR SOLUTION INTRAVENOUS at 16:26

## 2017-08-31 RX ADMIN — Medication 250 MILLIGRAM(S): at 22:24

## 2017-08-31 RX ADMIN — Medication 50 MILLIGRAM(S): at 23:18

## 2017-08-31 RX ADMIN — Medication 250 MILLIGRAM(S): at 22:09

## 2017-08-31 RX ADMIN — PIPERACILLIN AND TAZOBACTAM 25 GRAM(S): 4; .5 INJECTION, POWDER, LYOPHILIZED, FOR SOLUTION INTRAVENOUS at 22:05

## 2017-08-31 RX ADMIN — Medication 250 MILLIGRAM(S): at 04:19

## 2017-09-01 VITALS
TEMPERATURE: 98 F | OXYGEN SATURATION: 100 % | SYSTOLIC BLOOD PRESSURE: 99 MMHG | HEART RATE: 61 BPM | DIASTOLIC BLOOD PRESSURE: 65 MMHG | RESPIRATION RATE: 16 BRPM

## 2017-09-01 LAB
ANION GAP SERPL CALC-SCNC: 10 MMOL/L — SIGNIFICANT CHANGE UP (ref 5–17)
BUN SERPL-MCNC: 9 MG/DL — SIGNIFICANT CHANGE UP (ref 7–23)
CALCIUM SERPL-MCNC: 8.8 MG/DL — SIGNIFICANT CHANGE UP (ref 8.5–10.1)
CHLORIDE SERPL-SCNC: 102 MMOL/L — SIGNIFICANT CHANGE UP (ref 96–108)
CO2 SERPL-SCNC: 28 MMOL/L — SIGNIFICANT CHANGE UP (ref 22–31)
CREAT SERPL-MCNC: 0.81 MG/DL — SIGNIFICANT CHANGE UP (ref 0.5–1.3)
GLUCOSE SERPL-MCNC: 84 MG/DL — SIGNIFICANT CHANGE UP (ref 70–99)
HCT VFR BLD CALC: 28.8 % — LOW (ref 34.5–45)
HGB BLD-MCNC: 9.3 G/DL — LOW (ref 11.5–15.5)
MCHC RBC-ENTMCNC: 27.4 PG — SIGNIFICANT CHANGE UP (ref 27–34)
MCHC RBC-ENTMCNC: 32.4 GM/DL — SIGNIFICANT CHANGE UP (ref 32–36)
MCV RBC AUTO: 84.4 FL — SIGNIFICANT CHANGE UP (ref 80–100)
PLATELET # BLD AUTO: 463 K/UL — HIGH (ref 150–400)
POTASSIUM SERPL-MCNC: 4 MMOL/L — SIGNIFICANT CHANGE UP (ref 3.5–5.3)
POTASSIUM SERPL-SCNC: 4 MMOL/L — SIGNIFICANT CHANGE UP (ref 3.5–5.3)
RBC # BLD: 3.41 M/UL — LOW (ref 3.8–5.2)
RBC # FLD: 13.7 % — SIGNIFICANT CHANGE UP (ref 10.3–14.5)
SODIUM SERPL-SCNC: 140 MMOL/L — SIGNIFICANT CHANGE UP (ref 135–145)
WBC # BLD: 11 K/UL — HIGH (ref 3.8–10.5)
WBC # FLD AUTO: 11 K/UL — HIGH (ref 3.8–10.5)

## 2017-09-01 PROCEDURE — 87581 M.PNEUMON DNA AMP PROBE: CPT

## 2017-09-01 PROCEDURE — 80061 LIPID PANEL: CPT

## 2017-09-01 PROCEDURE — 84702 CHORIONIC GONADOTROPIN TEST: CPT

## 2017-09-01 PROCEDURE — 84443 ASSAY THYROID STIM HORMONE: CPT

## 2017-09-01 PROCEDURE — 84484 ASSAY OF TROPONIN QUANT: CPT

## 2017-09-01 PROCEDURE — 99285 EMERGENCY DEPT VISIT HI MDM: CPT | Mod: 25

## 2017-09-01 PROCEDURE — 71260 CT THORAX DX C+: CPT

## 2017-09-01 PROCEDURE — 74177 CT ABD & PELVIS W/CONTRAST: CPT

## 2017-09-01 PROCEDURE — 93971 EXTREMITY STUDY: CPT

## 2017-09-01 PROCEDURE — 83605 ASSAY OF LACTIC ACID: CPT

## 2017-09-01 PROCEDURE — 82150 ASSAY OF AMYLASE: CPT

## 2017-09-01 PROCEDURE — 87798 DETECT AGENT NOS DNA AMP: CPT

## 2017-09-01 PROCEDURE — 80053 COMPREHEN METABOLIC PANEL: CPT

## 2017-09-01 PROCEDURE — 87486 CHLMYD PNEUM DNA AMP PROBE: CPT

## 2017-09-01 PROCEDURE — 83036 HEMOGLOBIN GLYCOSYLATED A1C: CPT

## 2017-09-01 PROCEDURE — 87040 BLOOD CULTURE FOR BACTERIA: CPT

## 2017-09-01 PROCEDURE — 81001 URINALYSIS AUTO W/SCOPE: CPT

## 2017-09-01 PROCEDURE — 93005 ELECTROCARDIOGRAM TRACING: CPT

## 2017-09-01 PROCEDURE — 71045 X-RAY EXAM CHEST 1 VIEW: CPT

## 2017-09-01 PROCEDURE — 80048 BASIC METABOLIC PNL TOTAL CA: CPT

## 2017-09-01 PROCEDURE — 96375 TX/PRO/DX INJ NEW DRUG ADDON: CPT

## 2017-09-01 PROCEDURE — 83690 ASSAY OF LIPASE: CPT

## 2017-09-01 PROCEDURE — 82553 CREATINE MB FRACTION: CPT

## 2017-09-01 PROCEDURE — 82550 ASSAY OF CK (CPK): CPT

## 2017-09-01 PROCEDURE — 71046 X-RAY EXAM CHEST 2 VIEWS: CPT

## 2017-09-01 PROCEDURE — 85027 COMPLETE CBC AUTOMATED: CPT

## 2017-09-01 PROCEDURE — 96365 THER/PROPH/DIAG IV INF INIT: CPT

## 2017-09-01 PROCEDURE — 84145 PROCALCITONIN (PCT): CPT

## 2017-09-01 PROCEDURE — 99239 HOSP IP/OBS DSCHRG MGMT >30: CPT

## 2017-09-01 PROCEDURE — 87086 URINE CULTURE/COLONY COUNT: CPT

## 2017-09-01 PROCEDURE — 87633 RESP VIRUS 12-25 TARGETS: CPT

## 2017-09-01 RX ADMIN — PIPERACILLIN AND TAZOBACTAM 25 GRAM(S): 4; .5 INJECTION, POWDER, LYOPHILIZED, FOR SOLUTION INTRAVENOUS at 07:40

## 2017-09-01 NOTE — PROGRESS NOTE ADULT - SUBJECTIVE AND OBJECTIVE BOX
Patient is a 42y old  Female who presents with a chief complaint of fever (28 Aug 2017 20:59)    Asked to see patient for ID Consult  Interval History:R pyelo    CENTRAL LINE:   [  ] YES       [x  ] NO  SOLOMON:                 [  ] YES       [ x ] NO     PAST MEDICAL & SURGICAL HISTORY:  No pertinent past medical history  No significant past surgical history      REVIEW OF SYSTEMS:  All systems below were reviewed and are normal [  ]  Constitutional:  HEENT:  Lymph nodes:  ID:  Pulmonary:+ dry cough-chest wall pain with cough. no sob  Cardiac:  GI:no NVD abd pain  Renal:no dysuria  Musculoskeletal:  Neuro:  Endocrine:  Skin:  All other systems above were reviewed and are normal   [ x ]    Allergies  Allergies    No Known Allergies    Intolerances        MEDICATIONS  (STANDING):  cefTRIAXone   IVPB 1 Gram(s) IV Intermittent every 24 hours  sodium chloride 0.9%. 1000 milliLiter(s) (120 mL/Hr) IV Continuous <Continuous>  enoxaparin Injectable 40 milliGRAM(s) SubCutaneous every 24 hours    MEDICATIONS  (PRN):  acetaminophen   Tablet 650 milliGRAM(s) Oral every 6 hours PRN For Temp greater than 38 C (100.4 F)  naproxen 250 milliGRAM(s) Oral two times a day PRN mild/moderate pain  ondansetron Injectable 4 milliGRAM(s) IV Push every 6 hours PRN Nausea and/or Vomiting      Vital Signs Last 24 Hrs  T(C): 36.8 (30 Aug 2017 05:20), Max: 37.4 (29 Aug 2017 17:42)  T(F): 98.3 (30 Aug 2017 05:20), Max: 99.3 (29 Aug 2017 17:42)  HR: 73 (30 Aug 2017 05:20) (70 - 86)  BP: 107/73 (30 Aug 2017 05:20) (104/68 - 112/73)  BP(mean): --  RR: 16 (30 Aug 2017 05:20) (16 - 16)  SpO2: 98% (30 Aug 2017 05:20) (98% - 100%)    I&O's Summary    29 Aug 2017 07:01  -  30 Aug 2017 07:00  --------------------------------------------------------  IN: 3120 mL / OUT: 0 mL / NET: 3120 mL        PHYSICAL EXAM:  Constitutional:  HEENT:  Lymph nodes:  Neck:  Lungs:clear  Heart:rr  Abdomen:soft nontender  Renal:no CVAT  Extremities:swelling without pain R hand and wrist. IV site in R antecubital fossa mildly tender without erythema.  Musculoskeletal:  Neurologic:  Vascular:  Endocrine:  Skin:  All of the above normal except for written comments [ x ]    LABORATORY:    CBC Full  -  ( 29 Aug 2017 08:16 )  WBC Count : 18.5 K/uL  Hemoglobin : 9.3 g/dL  Hematocrit : 28.7 %  Platelet Count - Automated : 340 K/uL  Mean Cell Volume : 85.4 fl  Mean Cell Hemoglobin : 27.7 pg  Mean Cell Hemoglobin Concentration : 32.4 gm/dL  Auto Neutrophil # : x  Auto Lymphocyte # : x  Auto Monocyte # : x  Auto Eosinophil # : x  Auto Basophil # : x  Auto Neutrophil % : x  Auto Lymphocyte % : x  Auto Monocyte % : x  Auto Eosinophil % : x  Auto Basophil % : x      ESR:                    @ 13:10  --    C-Reactive Protein:      @ 13:10  --    Procalcitonin:            @ 13:10   0.14          140  |  107  |  4<L>  ----------------------------<  94  3.5   |  21<L>  |  0.84    Ca    7.4<L>      29 Aug 2017 08:16    TPro  7.6  /  Alb  2.5<L>  /  TBili  0.3  /  DBili  x   /  AST  21  /  ALT  31  /  AlkPhos  103        Rapid Respiratory Viral Panel Result         @ 14:34  Rapid [facility]  Coronovirus --  Adenovirus --  Bordetella Pertussis --  Chlamydia Pneumonia --  Entero/Rhinovirus--  HKU1 Coronovirus --  HMPV Coronovirus --  Influenza A --  Influenza AH1 --  Influenza AH1  --  Influenza AH3 --  Influenza B --  Mycoplasma Pneumoniae --  NL63 Coronovirus --  OC43 Coronovirus --  Parainfluenza 1 --  Parainfluenza 2 --  Parainfluenza 3 --  Parainfluenza 4 --  Resp Syncytial Virus --      Urinalysis Basic - ( 28 Aug 2017 13:31 )    Color: Yellow / Appearance: x / S.005 / pH: x  Gluc: x / Ketone: Negative  / Bili: Negative / Urobili: Negative   Blood: x / Protein: Negative / Nitrite: Negative   Leuk Esterase: Moderate / RBC: 0-2 /HPF / WBC 6-10   Sq Epi: x / Non Sq Epi: x / Bacteria: Few        Vanco. trough:  Genta. trough:      Radiology:    Microbiology:bl cx NG. ur cx sta. sap.- contaminant?
Patient is a 42y old  Female who presents with a chief complaint of fever (30 Aug 2017 15:23)    Asked to see patient for ID Consult  Interval History:R pyelo, cough    CENTRAL LINE:   [  ] YES       [ x ] NO  SOLOMON:                 [  ] YES       [ x ] NO     PAST MEDICAL & SURGICAL HISTORY:  No pertinent past medical history  No significant past surgical history      REVIEW OF SYSTEMS:  All systems below were reviewed and are normal [  ]  Constitutional:  HEENT:  Lymph nodes:  ID:  Pulmonary:dec cough and chest wall pain. no sob.  Cardiac:  GI:no NVD abd pain  Renal:no dysuria  Musculoskeletal:no pain or swelling RUE  Neuro:  Endocrine:  Skin:  All other systems above were reviewed and are normal   [ x ]    Allergies  Allergies    No Known Allergies    Intolerances        MEDICATIONS  (STANDING):  sodium chloride 0.9%. 1000 milliLiter(s) (120 mL/Hr) IV Continuous <Continuous>  enoxaparin Injectable 40 milliGRAM(s) SubCutaneous every 24 hours  amoxicillin  875 milliGRAM(s)/clavulanate 1 Tablet(s) Oral two times a day    MEDICATIONS  (PRN):  acetaminophen   Tablet 650 milliGRAM(s) Oral every 6 hours PRN For Temp greater than 38 C (100.4 F)  naproxen 250 milliGRAM(s) Oral two times a day PRN mild/moderate pain  ondansetron Injectable 4 milliGRAM(s) IV Push every 6 hours PRN Nausea and/or Vomiting  benzonatate 100 milliGRAM(s) Oral three times a day PRN Cough      Vital Signs Last 24 Hrs  T(C): 36.7 (01 Sep 2017 05:17), Max: 37 (31 Aug 2017 14:32)  T(F): 98 (01 Sep 2017 05:17), Max: 98.6 (31 Aug 2017 14:32)  HR: 61 (01 Sep 2017 05:17) (61 - 88)  BP: 99/65 (01 Sep 2017 05:17) (99/65 - 126/88)  BP(mean): --  RR: 16 (01 Sep 2017 05:17) (16 - 16)  SpO2: 100% (01 Sep 2017 05:17) (99% - 100%)    I&O's Summary    31 Aug 2017 07:01  -  01 Sep 2017 07:00  --------------------------------------------------------  IN: 340 mL / OUT: 0 mL / NET: 340 mL        PHYSICAL EXAM:  Constitutional:  HEENT:  Lymph nodes:  Neck:  Lungs:clear  Heart:rr. no chest wall pain  Abdomen:soft nontender  Renal:no back or CVAT  Extremities:no swelling RUE  Musculoskeletal:  Neurologic:  Vascular:  Endocrine:  Skin:  All of the above normal except for written comments [  x]    LABORATORY:    CBC Full  -  ( 01 Sep 2017 06:08 )  WBC Count : 11.0 K/uL  Hemoglobin : 9.3 g/dL  Hematocrit : 28.8 %  Platelet Count - Automated : 463 K/uL  Mean Cell Volume : 84.4 fl  Mean Cell Hemoglobin : 27.4 pg  Mean Cell Hemoglobin Concentration : 32.4 gm/dL  Auto Neutrophil # : x  Auto Lymphocyte # : x  Auto Monocyte # : x  Auto Eosinophil # : x  Auto Basophil # : x  Auto Neutrophil % : x  Auto Lymphocyte % : x  Auto Monocyte % : x  Auto Eosinophil % : x  Auto Basophil % : x      ESR:                   08-28 @ 13:10  --    C-Reactive Protein:     08-28 @ 13:10  --    Procalcitonin:           08-28 @ 13:10   0.14      09-01    140  |  102  |  9   ----------------------------<  84  4.0   |  28  |  0.81    Ca    8.8      01 Sep 2017 06:08        Rapid Respiratory Viral Panel Result        08-28 @ 14:34  Rapid [facility]  Coronovirus --  Adenovirus --  Bordetella Pertussis --  Chlamydia Pneumonia --  Entero/Rhinovirus--  HKU1 Coronovirus --  HMPV Coronovirus --  Influenza A --  Influenza AH1 --  Influenza AH1 2009 --  Influenza AH3 --  Influenza B --  Mycoplasma Pneumoniae --  NL63 Coronovirus --  OC43 Coronovirus --  Parainfluenza 1 --  Parainfluenza 2 --  Parainfluenza 3 --  Parainfluenza 4 --  Resp Syncytial Virus --          Vanco. trough:  Genta. trough:      Radiology:    Microbiology:bl cx NG, ur cx NG
Patient is a 42y old  Female who presents with a chief complaint of fever (30 Aug 2017 15:23)    Asked to see patient for ID Consult  Interval History:R pyelonephritis    CENTRAL LINE:   [  ] YES       [x  ] NO  SOLOMON:                 [  ] YES       [x  ] NO     PAST MEDICAL & SURGICAL HISTORY:  No pertinent past medical history  No significant past surgical history      REVIEW OF SYSTEMS:  All systems below were reviewed and are normal [  ]  Constitutional:fever with chills last PM  HEENT:  Lymph nodes:  ID:  Pulmonary:+ dry cough. no sob  Cardiac:  GI:no NVD abd pain  Renal:+ dysuria  Musculoskeletal:  Neuro:  Endocrine:  Skin:  All other systems above were reviewed and are normal   [ x ]    Allergies  Allergies    No Known Allergies    Intolerances        MEDICATIONS  (STANDING):  sodium chloride 0.9%. 1000 milliLiter(s) (120 mL/Hr) IV Continuous <Continuous>  enoxaparin Injectable 40 milliGRAM(s) SubCutaneous every 24 hours  piperacillin/tazobactam IVPB. 3.375 Gram(s) IV Intermittent every 8 hours    MEDICATIONS  (PRN):  acetaminophen   Tablet 650 milliGRAM(s) Oral every 6 hours PRN For Temp greater than 38 C (100.4 F)  naproxen 250 milliGRAM(s) Oral two times a day PRN mild/moderate pain  ondansetron Injectable 4 milliGRAM(s) IV Push every 6 hours PRN Nausea and/or Vomiting  benzonatate 100 milliGRAM(s) Oral three times a day PRN Cough      Vital Signs Last 24 Hrs  T(C): 36.9 (31 Aug 2017 05:24), Max: 38.3 (30 Aug 2017 14:38)  T(F): 98.4 (31 Aug 2017 05:24), Max: 101 (30 Aug 2017 14:38)  HR: 63 (31 Aug 2017 05:24) (63 - 90)  BP: 112/73 (31 Aug 2017 05:24) (98/66 - 124/86)  BP(mean): --  RR: 16 (31 Aug 2017 05:24) (16 - 19)  SpO2: 100% (31 Aug 2017 05:24) (99% - 100%)    I&O's Summary    30 Aug 2017 07:01  -  31 Aug 2017 07:00  --------------------------------------------------------  IN: 480 mL / OUT: 0 mL / NET: 480 mL        PHYSICAL EXAM:  Constitutional:  HEENT:  Lymph nodes:  Neck:  Lungs:clear  Heart:rr  Abdomen:soft. non tender  Renal:no back or CVAT  Extremities:no swelling R hand  Musculoskeletal:  Neurologic:  Vascular:  Endocrine:  Skin:  All of the above normal except for written comments [x  ]    LABORATORY:    CBC Full  -  ( 30 Aug 2017 07:50 )  WBC Count : 15.7 K/uL  Hemoglobin : 9.2 g/dL  Hematocrit : 27.5 %  Platelet Count - Automated : 382 K/uL  Mean Cell Volume : 85.3 fl  Mean Cell Hemoglobin : 28.4 pg  Mean Cell Hemoglobin Concentration : 33.3 gm/dL  Auto Neutrophil # : x  Auto Lymphocyte # : x  Auto Monocyte # : x  Auto Eosinophil # : x  Auto Basophil # : x  Auto Neutrophil % : x  Auto Lymphocyte % : x  Auto Monocyte % : x  Auto Eosinophil % : x  Auto Basophil % : x      ESR:                   08-28 @ 13:10  --    C-Reactive Protein:     08-28 @ 13:10  --    Procalcitonin:           08-28 @ 13:10   0.14      08-30    141  |  106  |  7   ----------------------------<  91  3.4<L>   |  27  |  0.84    Ca    8.3<L>      30 Aug 2017 07:50        Rapid Respiratory Viral Panel Result        08-28 @ 14:34  Rapid [facility]  Coronovirus --  Adenovirus --  Bordetella Pertussis --  Chlamydia Pneumonia --  Entero/Rhinovirus--  HKU1 Coronovirus --  HMPV Coronovirus --  Influenza A --  Influenza AH1 --  Influenza AH1 2009 --  Influenza AH3 --  Influenza B --  Mycoplasma Pneumoniae --  NL63 Coronovirus --  OC43 Coronovirus --  Parainfluenza 1 --  Parainfluenza 2 --  Parainfluenza 3 --  Parainfluenza 4 --  Resp Syncytial Virus --          Vanco. trough:  Genta. trough:      Radiology:< from: US Duplex Venous Upper Ext Ltd, Right (08.30.17 @ 15:23) >    EXAM:  US DPLX UPR EXT VEINS LTD RT                            PROCEDURE DATE:  08/30/2017          INTERPRETATION:  CLINICAL INFORMATION: Right arm swelling.    COMPARISON: None available.    TECHNIQUE: Duplex sonography of the RIGHT UPPER extremity with color and   spectral Doppler, with and without compression.      FINDINGS:    The visualized portions of the right internal jugular, subclavian,   axillary, brachial, basilic and cephalic veins are patent and   compressible where applicable.     Doppler examination shows normal spontaneous and phasic flow.    IMPRESSION:     No evidence of deep venous thrombosis in the visualized veins of the   right upper extremity.                    MARGARITA MORE M.D., ATTENDING RADIOLOGIST  This document has been electronically signed. Aug 30 2017  3:32PM    < end of copied text >      Microbiology:Adm bl cx remain NG. Adm ur cx sta sap. Rept bl and ur cx pending
Patient is a 42y old  Female who presents with a chief complaint of fever (28 Aug 2017 20:59)      INTERVAL HPI/OVERNIGHT EVENTS: Feeling better    MEDICATIONS  (STANDING):  cefTRIAXone   IVPB 1 Gram(s) IV Intermittent every 24 hours  sodium chloride 0.9%. 1000 milliLiter(s) (120 mL/Hr) IV Continuous <Continuous>  enoxaparin Injectable 40 milliGRAM(s) SubCutaneous every 24 hours    MEDICATIONS  (PRN):  acetaminophen   Tablet 650 milliGRAM(s) Oral every 6 hours PRN For Temp greater than 38 C (100.4 F)  naproxen 250 milliGRAM(s) Oral two times a day PRN mild/moderate pain  ondansetron Injectable 4 milliGRAM(s) IV Push every 6 hours PRN Nausea and/or Vomiting      Allergies    No Known Allergies    Intolerances        REVIEW OF SYSTEMS:  CONSTITUTIONAL: No fever, weight loss, or fatigue  EYES: No eye pain, visual disturbances, or discharge  ENMT:  No difficulty hearing, tinnitus, vertigo; No sinus or throat pain  NECK: No pain or stiffness  BREASTS: No pain, masses, or nipple discharge  RESPIRATORY: No cough, wheezing, chills or hemoptysis; No shortness of breath  CARDIOVASCULAR: No chest pain, palpitations, dizziness, or leg swelling  GASTROINTESTINAL: No abdominal or epigastric pain. No nausea, vomiting, or hematemesis; No diarrhea or constipation. No melena or hematochezia.  GENITOURINARY: No dysuria, frequency, hematuria, or incontinence  NEUROLOGICAL: No headaches, memory loss, loss of strength, numbness, or tremors  SKIN: No itching, burning, rashes, or lesions   LYMPH NODES: No enlarged glands  ENDOCRINE: No heat or cold intolerance; No hair loss; No polydipsia or polyuria  MUSCULOSKELETAL: No joint pain or swelling; No muscle, back, or extremity pain  PSYCHIATRIC: No depression, anxiety, mood swings, or difficulty sleeping  HEME/LYMPH: No easy bruising, or bleeding gums  ALLERGY AND IMMUNOLOGIC: No hives or eczema    Vital Signs Last 24 Hrs  T(C): 36.8 (30 Aug 2017 05:20), Max: 37.4 (29 Aug 2017 17:42)  T(F): 98.3 (30 Aug 2017 05:20), Max: 99.3 (29 Aug 2017 17:42)  HR: 73 (30 Aug 2017 05:20) (70 - 86)  BP: 107/73 (30 Aug 2017 05:20) (104/68 - 112/73)  BP(mean): --  RR: 16 (30 Aug 2017 05:20) (16 - 16)  SpO2: 98% (30 Aug 2017 05:20) (98% - 100%)    PHYSICAL EXAM:  GENERAL: NAD, well-groomed, well-developed  HEAD:  Atraumatic, Normocephalic  EYES: EOMI, PERRLA, conjunctiva and sclera clear  ENMT: No tonsillar erythema, exudates, or enlargement; Moist mucous membranes, Good dentition, No lesions  NECK: Supple, No JVD, Normal thyroid  NERVOUS SYSTEM:  Alert & Oriented X3, Good concentration; Motor Strength 5/5 B/L upper and lower extremities; DTRs 2+ intact and symmetric  CHEST/LUNG: Clear to auscultation bilaterally; No rales, rhonchi, wheezing, or rubs  HEART: Regular rate and rhythm; No murmurs, rubs, or gallops  ABDOMEN: Soft, Nontender, Nondistended; Bowel sounds present  EXTREMITIES:  2+ Peripheral Pulses, No clubbing, cyanosis, or edema  LYMPH: No lymphadenopathy noted  SKIN: No rashes or lesions    LABS:                        9.2    15.7  )-----------( 382      ( 30 Aug 2017 07:50 )             27.5     30 Aug 2017 07:50    141    |  106    |  7      ----------------------------<  91     3.4     |  27     |  0.84     Ca    8.3        30 Aug 2017 07:50        CAPILLARY BLOOD GLUCOSE        BLOOD CULTURE    RADIOLOGY & ADDITIONAL TESTS:    Imaging Personally Reviewed:  [ ] YES     Consultant(s) Notes Reviewed:      Care Discussed with Consultants/Other Providers:
Patient is a 42y old  Female who presents with a chief complaint of fever (28 Aug 2017 20:59)      INTERVAL HPI/OVERNIGHT EVENTS: No new symptoms/ complaints    MEDICATIONS  (STANDING):  cefTRIAXone   IVPB 1 Gram(s) IV Intermittent every 24 hours  sodium chloride 0.9%. 1000 milliLiter(s) (120 mL/Hr) IV Continuous <Continuous>  enoxaparin Injectable 40 milliGRAM(s) SubCutaneous every 24 hours    MEDICATIONS  (PRN):  acetaminophen   Tablet 650 milliGRAM(s) Oral every 6 hours PRN For Temp greater than 38 C (100.4 F)  naproxen 250 milliGRAM(s) Oral two times a day PRN mild/moderate pain  ondansetron Injectable 4 milliGRAM(s) IV Push every 6 hours PRN Nausea and/or Vomiting      Allergies    No Known Allergies    Intolerances        REVIEW OF SYSTEMS:  CONSTITUTIONAL: No fever, weight loss, or fatigue  EYES: No eye pain, visual disturbances, or discharge  ENMT:  No difficulty hearing, tinnitus, vertigo; No sinus or throat pain  NECK: No pain or stiffness  BREASTS: No pain, masses, or nipple discharge  RESPIRATORY: No cough, wheezing, chills or hemoptysis; No shortness of breath  CARDIOVASCULAR: No chest pain, palpitations, dizziness, or leg swelling  GASTROINTESTINAL: No abdominal or epigastric pain. No nausea, vomiting, or hematemesis; No diarrhea or constipation. No melena or hematochezia.  GENITOURINARY: No dysuria, frequency, hematuria, or incontinence  NEUROLOGICAL: No headaches, memory loss, loss of strength, numbness, or tremors  SKIN: No itching, burning, rashes, or lesions   LYMPH NODES: No enlarged glands  ENDOCRINE: No heat or cold intolerance; No hair loss; No polydipsia or polyuria  MUSCULOSKELETAL: No joint pain or swelling; No muscle, back, or extremity pain  PSYCHIATRIC: No depression, anxiety, mood swings, or difficulty sleeping  HEME/LYMPH: No easy bruising, or bleeding gums  ALLERGY AND IMMUNOLOGIC: No hives or eczema    Vital Signs Last 24 Hrs  T(C): 36.3 (29 Aug 2017 05:18), Max: 37.6 (28 Aug 2017 12:12)  T(F): 97.4 (29 Aug 2017 05:18), Max: 99.6 (28 Aug 2017 12:12)  HR: 89 (29 Aug 2017 05:18) (82 - 107)  BP: 96/64 (29 Aug 2017 05:18) (96/64 - 107/71)  BP(mean): --  RR: 16 (29 Aug 2017 05:18) (16 - 18)  SpO2: 98% (29 Aug 2017 05:18) (98% - 100%)    PHYSICAL EXAM:  GENERAL: NAD, well-groomed, well-developed  HEAD:  Atraumatic, Normocephalic  EYES: EOMI, PERRLA, conjunctiva and sclera clear  ENMT: No tonsillar erythema, exudates, or enlargement; Moist mucous membranes, Good dentition, No lesions  NECK: Supple, No JVD, Normal thyroid  NERVOUS SYSTEM:  Alert & Oriented X3, Good concentration; Motor Strength 5/5 B/L upper and lower extremities; DTRs 2+ intact and symmetric  CHEST/LUNG: Clear to auscultation bilaterally; No rales, rhonchi, wheezing, or rubs  HEART: Regular rate and rhythm; No murmurs, rubs, or gallops  ABDOMEN: Soft, Nontender, Nondistended; Bowel sounds present, mild right flank tenderness  EXTREMITIES:  2+ Peripheral Pulses, No clubbing, cyanosis, or edema  LYMPH: No lymphadenopathy noted  SKIN: No rashes or lesions    LABS:                        9.3    18.5  )-----------( 340      ( 29 Aug 2017 08:16 )             28.7     29 Aug 2017 08:16    140    |  107    |  4      ----------------------------<  94     3.5     |  21     |  0.84     Ca    7.4        29 Aug 2017 08:16    TPro  7.6    /  Alb  2.5    /  TBili  0.3    /  DBili  x      /  AST  21     /  ALT  31     /  AlkPhos  103    28 Aug 2017 13:10      CAPILLARY BLOOD GLUCOSE        BLOOD CULTURE    RADIOLOGY & ADDITIONAL TESTS:    Imaging Personally Reviewed:  [ ] YES     Consultant(s) Notes Reviewed:      Care Discussed with Consultants/Other Providers:
Patient is a 42y old  Female who presents with a chief complaint of fever (30 Aug 2017 15:23)      INTERVAL HPI/OVERNIGHT EVENTS: Patient seen and examined at bedside. C/O dysuria    MEDICATIONS  (STANDING):  sodium chloride 0.9%. 1000 milliLiter(s) (120 mL/Hr) IV Continuous <Continuous>  enoxaparin Injectable 40 milliGRAM(s) SubCutaneous every 24 hours  piperacillin/tazobactam IVPB. 3.375 Gram(s) IV Intermittent every 8 hours    MEDICATIONS  (PRN):  acetaminophen   Tablet 650 milliGRAM(s) Oral every 6 hours PRN For Temp greater than 38 C (100.4 F)  naproxen 250 milliGRAM(s) Oral two times a day PRN mild/moderate pain  ondansetron Injectable 4 milliGRAM(s) IV Push every 6 hours PRN Nausea and/or Vomiting  benzonatate 100 milliGRAM(s) Oral three times a day PRN Cough      Allergies    No Known Allergies    Intolerances        REVIEW OF SYSTEMS:  CONSTITUTIONAL: No fever, or fatigue  EYES: No eye pain, visual disturbances, or discharge  ENMT:  No difficulty hearing, tinnitus, vertigo; No sinus or throat pain  NECK: No pain or stiffness  RESPIRATORY: No cough, wheezing, chills or hemoptysis; No shortness of breath  CARDIOVASCULAR: No chest pain, palpitations, dizziness, or leg swelling  GASTROINTESTINAL: No abdominal or epigastric pain. No nausea, vomiting, or hematemesis; No diarrhea or constipation. No melena or hematochezia.  GENITOURINARY: + dysuria, frequency, hematuria, or incontinence  NEUROLOGICAL: No headaches, memory loss, loss of strength, numbness, or tremors  SKIN: No itching, burning, rashes, or lesions   LYMPH NODES: No enlarged glands  ENDOCRINE: No heat or cold intolerance; No hair loss; No polydipsia or polyuria  MUSCULOSKELETAL: No joint pain or swelling; No muscle, back, or extremity pain  PSYCHIATRIC: No depression, anxiety, mood swings, or difficulty sleeping  HEME/LYMPH: No easy bruising, or bleeding gums  ALLERGY AND IMMUNOLOGIC: No hives or eczema    Vital Signs Last 24 Hrs  T(C): 36.9 (31 Aug 2017 05:24), Max: 38.3 (30 Aug 2017 14:38)  T(F): 98.4 (31 Aug 2017 05:24), Max: 101 (30 Aug 2017 14:38)  HR: 63 (31 Aug 2017 05:24) (63 - 90)  BP: 112/73 (31 Aug 2017 05:24) (98/66 - 124/86)  BP(mean): --  RR: 16 (31 Aug 2017 05:24) (16 - 19)  SpO2: 100% (31 Aug 2017 05:24) (99% - 100%)    PHYSICAL EXAM:  GENERAL: NAD, well-groomed, well-developed  HEAD:  Atraumatic, Normocephalic  EYES: EOMI, PERRLA, conjunctiva and sclera clear  ENMT: No tonsillar erythema, exudates, or enlargement; Moist mucous membranes  NECK: Supple, No JVD, Normal thyroid  NERVOUS SYSTEM:  Alert & Oriented X3, Good concentration; Non focal  CHEST/LUNG: Clear to auscultation bilaterally; No rales, rhonchi, wheezing, or rubs  HEART: Regular rate and rhythm; No murmurs, rubs, or gallops  ABDOMEN: Soft, Nontender, Nondistended; Bowel sounds present  EXTREMITIES:  2+ Peripheral Pulses, No clubbing, cyanosis, or edema  LYMPH: No lymphadenopathy noted  SKIN: No rashes or lesions    LABS:                        9.4    11.8  )-----------( 449      ( 31 Aug 2017 08:23 )             28.7     31 Aug 2017 08:23    141    |  104    |  7      ----------------------------<  82     3.7     |  28     |  0.74     Ca    8.8        31 Aug 2017 08:23        CAPILLARY BLOOD GLUCOSE        BLOOD CULTURE    RADIOLOGY & ADDITIONAL TESTS:    Imaging Personally Reviewed:  [ ] YES     Consultant(s) Notes Reviewed:      Care Discussed with Consultants/Other Providers:

## 2017-09-01 NOTE — PROGRESS NOTE ADULT - ASSESSMENT
41 y/o F with no significant PMH presents to the ED with fever x 6 days, admitted for sepsis 2/2 pyleonephritis.
43 y/o F with no significant PMH presents to the ED with fever x 6 days, admitted for sepsis 2/2 pyelonephritis
43 y/o F with no significant PMH presents to the ED with fever x 6 days, admitted for sepsis 2/2 pyleonephritis.
cont. rocephin(3).  change IV site. US RUE-r/o DVT
DC rocephin (4). Add zosyn (1/4). await rept bl cx,  ur cx and cxr
DC zosyn (2/5) add augmentin (2/5) x 5 days

## 2017-09-02 LAB
CULTURE RESULTS: SIGNIFICANT CHANGE UP
CULTURE RESULTS: SIGNIFICANT CHANGE UP
SPECIMEN SOURCE: SIGNIFICANT CHANGE UP
SPECIMEN SOURCE: SIGNIFICANT CHANGE UP

## 2017-09-05 DIAGNOSIS — E87.6 HYPOKALEMIA: ICD-10-CM

## 2017-09-05 DIAGNOSIS — R05 COUGH: ICD-10-CM

## 2017-09-05 DIAGNOSIS — A41.9 SEPSIS, UNSPECIFIED ORGANISM: ICD-10-CM

## 2017-09-05 DIAGNOSIS — N12 TUBULO-INTERSTITIAL NEPHRITIS, NOT SPECIFIED AS ACUTE OR CHRONIC: ICD-10-CM

## 2017-09-05 DIAGNOSIS — R51 HEADACHE: ICD-10-CM

## 2017-09-05 DIAGNOSIS — N39.0 URINARY TRACT INFECTION, SITE NOT SPECIFIED: ICD-10-CM

## 2017-12-02 ENCOUNTER — INPATIENT (INPATIENT)
Facility: HOSPITAL | Age: 42
LOS: 2 days | Discharge: HOME | DRG: 872 | End: 2017-12-05
Attending: INTERNAL MEDICINE | Admitting: HOSPITALIST
Payer: COMMERCIAL

## 2017-12-02 VITALS
DIASTOLIC BLOOD PRESSURE: 70 MMHG | SYSTOLIC BLOOD PRESSURE: 102 MMHG | OXYGEN SATURATION: 100 % | RESPIRATION RATE: 16 BRPM | WEIGHT: 113.98 LBS | TEMPERATURE: 99 F | HEART RATE: 100 BPM

## 2017-12-02 DIAGNOSIS — Z29.9 ENCOUNTER FOR PROPHYLACTIC MEASURES, UNSPECIFIED: ICD-10-CM

## 2017-12-02 DIAGNOSIS — N10 ACUTE PYELONEPHRITIS: ICD-10-CM

## 2017-12-02 DIAGNOSIS — R50.9 FEVER, UNSPECIFIED: ICD-10-CM

## 2017-12-02 DIAGNOSIS — A41.9 SEPSIS, UNSPECIFIED ORGANISM: ICD-10-CM

## 2017-12-02 LAB
ALBUMIN SERPL ELPH-MCNC: 3.3 G/DL — SIGNIFICANT CHANGE UP (ref 3.3–5)
ALP SERPL-CCNC: 62 U/L — SIGNIFICANT CHANGE UP (ref 40–120)
ALT FLD-CCNC: 17 U/L — SIGNIFICANT CHANGE UP (ref 12–78)
AMYLASE P1 CFR SERPL: 34 U/L — SIGNIFICANT CHANGE UP (ref 25–115)
ANION GAP SERPL CALC-SCNC: 9 MMOL/L — SIGNIFICANT CHANGE UP (ref 5–17)
APPEARANCE UR: CLEAR — SIGNIFICANT CHANGE UP
AST SERPL-CCNC: 14 U/L — LOW (ref 15–37)
BASOPHILS # BLD AUTO: 0.1 K/UL — SIGNIFICANT CHANGE UP (ref 0–0.2)
BASOPHILS NFR BLD AUTO: 0.7 % — SIGNIFICANT CHANGE UP (ref 0–2)
BILIRUB SERPL-MCNC: 0.4 MG/DL — SIGNIFICANT CHANGE UP (ref 0.2–1.2)
BILIRUB UR-MCNC: NEGATIVE — SIGNIFICANT CHANGE UP
BUN SERPL-MCNC: 8 MG/DL — SIGNIFICANT CHANGE UP (ref 7–23)
CALCIUM SERPL-MCNC: 8.4 MG/DL — LOW (ref 8.5–10.1)
CHLORIDE SERPL-SCNC: 104 MMOL/L — SIGNIFICANT CHANGE UP (ref 96–108)
CO2 SERPL-SCNC: 27 MMOL/L — SIGNIFICANT CHANGE UP (ref 22–31)
COLOR SPEC: SIGNIFICANT CHANGE UP
CREAT SERPL-MCNC: 1 MG/DL — SIGNIFICANT CHANGE UP (ref 0.5–1.3)
DIFF PNL FLD: ABNORMAL
EOSINOPHIL # BLD AUTO: 0 K/UL — SIGNIFICANT CHANGE UP (ref 0–0.5)
EOSINOPHIL NFR BLD AUTO: 0 % — SIGNIFICANT CHANGE UP (ref 0–6)
GLUCOSE SERPL-MCNC: 112 MG/DL — HIGH (ref 70–99)
GLUCOSE UR QL: NEGATIVE — SIGNIFICANT CHANGE UP
HCT VFR BLD CALC: 37.8 % — SIGNIFICANT CHANGE UP (ref 34.5–45)
HGB BLD-MCNC: 11.9 G/DL — SIGNIFICANT CHANGE UP (ref 11.5–15.5)
KETONES UR-MCNC: NEGATIVE — SIGNIFICANT CHANGE UP
LACTATE SERPL-SCNC: 2 MMOL/L — SIGNIFICANT CHANGE UP (ref 0.7–2)
LEUKOCYTE ESTERASE UR-ACNC: NEGATIVE — SIGNIFICANT CHANGE UP
LIDOCAIN IGE QN: 177 U/L — SIGNIFICANT CHANGE UP (ref 73–393)
LYMPHOCYTES # BLD AUTO: 1.7 K/UL — SIGNIFICANT CHANGE UP (ref 1–3.3)
LYMPHOCYTES # BLD AUTO: 9.9 % — LOW (ref 13–44)
MCHC RBC-ENTMCNC: 27.4 PG — SIGNIFICANT CHANGE UP (ref 27–34)
MCHC RBC-ENTMCNC: 31.4 GM/DL — LOW (ref 32–36)
MCV RBC AUTO: 87.1 FL — SIGNIFICANT CHANGE UP (ref 80–100)
MONOCYTES # BLD AUTO: 1.2 K/UL — HIGH (ref 0–0.9)
MONOCYTES NFR BLD AUTO: 7.4 % — SIGNIFICANT CHANGE UP (ref 1–9)
NEUTROPHILS # BLD AUTO: 13.8 K/UL — HIGH (ref 1.8–7.4)
NEUTROPHILS NFR BLD AUTO: 82 % — HIGH (ref 43–77)
NITRITE UR-MCNC: NEGATIVE — SIGNIFICANT CHANGE UP
PH UR: 7 — SIGNIFICANT CHANGE UP (ref 5–8)
PLATELET # BLD AUTO: 243 K/UL — SIGNIFICANT CHANGE UP (ref 150–400)
POTASSIUM SERPL-MCNC: 3.4 MMOL/L — LOW (ref 3.5–5.3)
POTASSIUM SERPL-SCNC: 3.4 MMOL/L — LOW (ref 3.5–5.3)
PROT SERPL-MCNC: 7.6 G/DL — SIGNIFICANT CHANGE UP (ref 6–8.3)
PROT UR-MCNC: NEGATIVE — SIGNIFICANT CHANGE UP
RBC # BLD: 4.34 M/UL — SIGNIFICANT CHANGE UP (ref 3.8–5.2)
RBC # FLD: 13.5 % — SIGNIFICANT CHANGE UP (ref 10.3–14.5)
SODIUM SERPL-SCNC: 140 MMOL/L — SIGNIFICANT CHANGE UP (ref 135–145)
SP GR SPEC: 1 — LOW (ref 1.01–1.02)
UROBILINOGEN FLD QL: NEGATIVE — SIGNIFICANT CHANGE UP
WBC # BLD: 16.8 K/UL — HIGH (ref 3.8–10.5)
WBC # FLD AUTO: 16.8 K/UL — HIGH (ref 3.8–10.5)

## 2017-12-02 PROCEDURE — 99223 1ST HOSP IP/OBS HIGH 75: CPT | Mod: AI

## 2017-12-02 PROCEDURE — 71010: CPT | Mod: 26

## 2017-12-02 PROCEDURE — 99285 EMERGENCY DEPT VISIT HI MDM: CPT

## 2017-12-02 PROCEDURE — 74177 CT ABD & PELVIS W/CONTRAST: CPT | Mod: 26

## 2017-12-02 PROCEDURE — 93010 ELECTROCARDIOGRAM REPORT: CPT

## 2017-12-02 RX ORDER — SODIUM CHLORIDE 9 MG/ML
3 INJECTION INTRAMUSCULAR; INTRAVENOUS; SUBCUTANEOUS ONCE
Qty: 0 | Refills: 0 | Status: COMPLETED | OUTPATIENT
Start: 2017-12-02 | End: 2017-12-02

## 2017-12-02 RX ORDER — SODIUM CHLORIDE 9 MG/ML
1000 INJECTION INTRAMUSCULAR; INTRAVENOUS; SUBCUTANEOUS ONCE
Qty: 0 | Refills: 0 | Status: COMPLETED | OUTPATIENT
Start: 2017-12-02 | End: 2017-12-02

## 2017-12-02 RX ORDER — KETOROLAC TROMETHAMINE 30 MG/ML
30 SYRINGE (ML) INJECTION ONCE
Qty: 0 | Refills: 0 | Status: DISCONTINUED | OUTPATIENT
Start: 2017-12-02 | End: 2017-12-02

## 2017-12-02 RX ORDER — MORPHINE SULFATE 50 MG/1
4 CAPSULE, EXTENDED RELEASE ORAL ONCE
Qty: 0 | Refills: 0 | Status: DISCONTINUED | OUTPATIENT
Start: 2017-12-02 | End: 2017-12-02

## 2017-12-02 RX ORDER — ACETAMINOPHEN 500 MG
1000 TABLET ORAL ONCE
Qty: 0 | Refills: 0 | Status: COMPLETED | OUTPATIENT
Start: 2017-12-02 | End: 2017-12-02

## 2017-12-02 RX ORDER — PIPERACILLIN AND TAZOBACTAM 4; .5 G/20ML; G/20ML
3.38 INJECTION, POWDER, LYOPHILIZED, FOR SOLUTION INTRAVENOUS EVERY 8 HOURS
Qty: 0 | Refills: 0 | Status: DISCONTINUED | OUTPATIENT
Start: 2017-12-02 | End: 2017-12-05

## 2017-12-02 RX ORDER — ONDANSETRON 8 MG/1
4 TABLET, FILM COATED ORAL ONCE
Qty: 0 | Refills: 0 | Status: COMPLETED | OUTPATIENT
Start: 2017-12-02 | End: 2017-12-02

## 2017-12-02 RX ORDER — PIPERACILLIN AND TAZOBACTAM 4; .5 G/20ML; G/20ML
3.38 INJECTION, POWDER, LYOPHILIZED, FOR SOLUTION INTRAVENOUS ONCE
Qty: 0 | Refills: 0 | Status: COMPLETED | OUTPATIENT
Start: 2017-12-02 | End: 2017-12-02

## 2017-12-02 RX ORDER — POTASSIUM CHLORIDE 20 MEQ
40 PACKET (EA) ORAL ONCE
Qty: 0 | Refills: 0 | Status: COMPLETED | OUTPATIENT
Start: 2017-12-02 | End: 2017-12-02

## 2017-12-02 RX ADMIN — PIPERACILLIN AND TAZOBACTAM 200 GRAM(S): 4; .5 INJECTION, POWDER, LYOPHILIZED, FOR SOLUTION INTRAVENOUS at 18:11

## 2017-12-02 RX ADMIN — ONDANSETRON 4 MILLIGRAM(S): 8 TABLET, FILM COATED ORAL at 17:08

## 2017-12-02 RX ADMIN — Medication 30 MILLIGRAM(S): at 20:39

## 2017-12-02 RX ADMIN — Medication 40 MILLIEQUIVALENT(S): at 22:22

## 2017-12-02 RX ADMIN — SODIUM CHLORIDE 1000 MILLILITER(S): 9 INJECTION INTRAMUSCULAR; INTRAVENOUS; SUBCUTANEOUS at 18:08

## 2017-12-02 RX ADMIN — Medication 30 MILLIGRAM(S): at 20:38

## 2017-12-02 RX ADMIN — SODIUM CHLORIDE 3 MILLILITER(S): 9 INJECTION INTRAMUSCULAR; INTRAVENOUS; SUBCUTANEOUS at 18:08

## 2017-12-02 RX ADMIN — MORPHINE SULFATE 4 MILLIGRAM(S): 50 CAPSULE, EXTENDED RELEASE ORAL at 17:08

## 2017-12-02 RX ADMIN — SODIUM CHLORIDE 1000 MILLILITER(S): 9 INJECTION INTRAMUSCULAR; INTRAVENOUS; SUBCUTANEOUS at 19:06

## 2017-12-02 RX ADMIN — Medication 1000 MILLIGRAM(S): at 18:11

## 2017-12-02 RX ADMIN — SODIUM CHLORIDE 1000 MILLILITER(S): 9 INJECTION INTRAMUSCULAR; INTRAVENOUS; SUBCUTANEOUS at 20:38

## 2017-12-02 RX ADMIN — MORPHINE SULFATE 4 MILLIGRAM(S): 50 CAPSULE, EXTENDED RELEASE ORAL at 18:32

## 2017-12-02 RX ADMIN — PIPERACILLIN AND TAZOBACTAM 25 GRAM(S): 4; .5 INJECTION, POWDER, LYOPHILIZED, FOR SOLUTION INTRAVENOUS at 22:23

## 2017-12-02 NOTE — ED PROVIDER NOTE - PROGRESS NOTE DETAILS
Pt now identified as sepsis with elevated HR, leukocytosis, and UTI source.  Abx ordered.  IVF running will continue to monitor.  Sepsis Resident paged Pt reports improvement in pain.  Resting comfortably in bed at this time.  /60.  Will continue to monitor repeat vitals: temp 101.1, pulse 92, BP 94/56, RR 16.  Pt reports improvement in symptoms with pain scale at 2.  On exam lying in bed no acute distress noted.  Heart RRR, lungs CTA bilaterally, abd soft NT/ND, cap refill 3 seconds at this time, skin elastic, no rash noted warm to touch.  Awaiting results fo CT scan at this time.  Pt likely suffering from pyelonephritis as this was her presentation last time.  Initial lactate normal.  Pt transiently hypotensive but perfusing vital organs awake and in no distress.  Will continue IV hydration and reassess.  Updated on plan repeat vitals: temp 101.1, pulse 92, BP 94/56, RR 16.  Pt reports improvement in symptoms with pain scale at 2.  On exam lying in bed no acute distress noted.  Heart RRR, lungs CTA bilaterally, abd soft NT/ND, cap refill 3 seconds at this time, skin elastic, no rash noted hot to touch.  Awaiting results fo CT scan at this time.  Pt likely suffering from pyelonephritis as this was her presentation last time.  Initial lactate normal.  Pt transiently hypotensive but perfusing vital organs awake and in no distress.  Will continue IV hydration and reassess.  Updated on plan Pt re-examined IVF running at this time Pulse 87 BP 98/53 with MAP of 67 RR 16.  Pt lying in bed asking for juice.  Pain controlled.  Heart RRR, lungs CTA, abd soft NT/ND, cap refill 2 seconds.  Skin remains hot with no rashes, dry at this time.  Hospitalist called pt admitted

## 2017-12-02 NOTE — H&P ADULT - HISTORY OF PRESENT ILLNESS
43 y/o F with PMH of pyelonephritis in august 2017 presents to the ED with flank pain.  Symptoms started 2 days ago.  Pain is dull achy in nature, which started on the right flank but now radiates to the front of the abdomen.  Pt has only tried motrin today for pain relief.  Pt reports temp 102.8 at home. 41 y/o F with PMH of pyelonephritis in august 2017 presents to the ED with flank pain.  Symptoms started 2 days ago.  Pain is dull achy in nature, which started on the right flank but now radiates to the front of the abdomen.  Pt has only tried motrin today for pain relief.  Pt reports temp 102.8 at home. Pt said she felt nausea, but no vomiting.  Reports headache and mild dizziness

## 2017-12-02 NOTE — H&P ADULT - NSHPREVIEWOFSYSTEMS_GEN_ALL_CORE
Constitutional: +fever, denies diaphoresis;  HEENT: denies blurry vision  Respiratory: denies SOB, sputum production; positive: cough  Cardiovascular: denies palpitations; Positive: CP only when coughing  Gastrointestinal: +right flank pain/abd pain, denies diarrhea, constipation, melena, hematochezia; no nausea/vomiting  Genitourinary: XXXXdenies frequency, hematuria; Positive: dysuria, urgency, burning  Skin/Breast: denies rash  Musculoskeletal: denies myalgias  Neurologic: denies weakness, dizziness; Positive: HA  Psychiatric: denies feeling anxious, depressed  Hematology/Oncology: denies bruising  ROS negative except as noted above Constitutional: +fever, denies diaphoresis;  HEENT: denies blurry vision  Respiratory: denies SOB, sputum production; positive: cough  Cardiovascular: denies palpitations; Positive: CP only when coughing  Gastrointestinal: +right flank pain/abd pain, denies diarrhea, constipation, melena, hematochezia; +nausea/ no vomiting  Genitourinary: +frequency but denies dysuria Positive:   Skin/Breast: denies rash  Musculoskeletal: denies myalgias  Neurologic: denies weakness some dizziness and headache  Psychiatric: denies feeling anxious, depressed  Hematology/Oncology: denies bruising  ROS negative except as noted above

## 2017-12-02 NOTE — ED PROVIDER NOTE - CARE PLAN
Principal Discharge DX:	Sepsis  Instructions for follow-up, activity and diet:	admit  Secondary Diagnosis:	Acute pyelonephritis  Secondary Diagnosis:	Fever

## 2017-12-02 NOTE — ED ADULT NURSE NOTE - OBJECTIVE STATEMENT
Pt presents with fever, Left flank pain, prepared for CT, tylenol given for pain/fever, fluids started, breathing even and unlabored, advised on plan of care, verbalized understanding, educated on pain medication, verbalized understanding, awaiting dispo

## 2017-12-02 NOTE — H&P ADULT - NSHPPHYSICALEXAM_GEN_ALL_CORE
Physical Exam:  General: Well developed, well nourished, NAD, overweight  HEENT: NCAT, EOMI bl, moist mucous membranes   Neck: Supple, nontender  Neurology: A&Ox3  Respiratory: CTA B/L, No W/R/R  CV: RRR, +S1/S2, no murmurs, rubs or gallops  Abdominal: XXXX Soft, NT, ND +BSx4; Positive: slight suprapubic tenderness on palpation  Extremities: No C/C/E, +peripheral pulses  MSK: Positive: R flank tenderness to palpation  Skin: warm, dry, normal color, no rash or abnormal lesions Physical Exam:  General: Well developed, well nourished, NAD, overweight  HEENT: NCAT, EOMI bl, moist mucous membranes   Neck: Supple, nontender  Neurology: A&Ox3  Respiratory: CTA B/L, No W/R/R  CV: RRR, +S1/S2, no murmurs, rubs or gallops  Abdominal: RIGHT CVA tenderness, Abd-Soft, NT, ND +BSx4;   Extremities: No C/C/E, +peripheral pulses  MSK: Positive: R flank tenderness to palpation  Skin: warm, dry, normal color, no rash or abnormal lesions

## 2017-12-02 NOTE — ED PROVIDER NOTE - OBJECTIVE STATEMENT
Pt is a 43 yo female who presents to the ED with a cc of flank pain.  PMHx of pyelonephritis last August for which she was hospitalized, stabilized, and then discharged home on po Augmentin.  Pt reports that symptoms first began 2 days ago.  She reports that the pain began in her right flank and first started as a dull ache but has progressively worsened and now radiates to the front of her abd.  She also reports associated temperatures with T max of 102.8.  Pt last took Motrin at 2 pm today.  She further reports a frontal HA, chills, nausea, loose stool, and urinary frequency.  Denies dysuria, hematuria, vomiting, CP, SOB, ext numbness or weakness.  Pt is currently on her menstrual cycle but denies chance of pregnancy.  Her last urine cultures were positive for staph saprophyticus.

## 2017-12-02 NOTE — H&P ADULT - PROBLEM SELECTOR PLAN 1
- Abd/pelvis CT positive for pyleonephritis, UA+  - Admit pt to [facility]  - Continue zosyn  f/u cultures  - Tylenol prn for fever  - Naproxen prn for pain  - F/u Urine Cx   - F/u Blood Cx  - F/u AM labs - Abd/pelvis CT positive for pyleonephritis, UA+  - Admit pt to [facility]  - Continue zosyn  f/u cultures  - Tylenol prn for fever  - Naproxen prn for pain  - F/u Urine Cx   - F/u Blood Cx  - F/u AM labs  IVF

## 2017-12-02 NOTE — H&P ADULT - ASSESSMENT
43 y/o F with no significant PMH presents to the ED with fever x 6 days, admitted for sepsis 2/2 pyleonephritis.

## 2017-12-03 DIAGNOSIS — E87.6 HYPOKALEMIA: ICD-10-CM

## 2017-12-03 LAB
ANION GAP SERPL CALC-SCNC: 9 MMOL/L — SIGNIFICANT CHANGE UP (ref 5–17)
BASOPHILS # BLD AUTO: 0 K/UL — SIGNIFICANT CHANGE UP (ref 0–0.2)
BASOPHILS NFR BLD AUTO: 0.5 % — SIGNIFICANT CHANGE UP (ref 0–2)
BUN SERPL-MCNC: 5 MG/DL — LOW (ref 7–23)
CALCIUM SERPL-MCNC: 7.3 MG/DL — LOW (ref 8.5–10.1)
CHLORIDE SERPL-SCNC: 112 MMOL/L — HIGH (ref 96–108)
CO2 SERPL-SCNC: 21 MMOL/L — LOW (ref 22–31)
CREAT SERPL-MCNC: 0.66 MG/DL — SIGNIFICANT CHANGE UP (ref 0.5–1.3)
CULTURE RESULTS: SIGNIFICANT CHANGE UP
EOSINOPHIL # BLD AUTO: 0 K/UL — SIGNIFICANT CHANGE UP (ref 0–0.5)
EOSINOPHIL NFR BLD AUTO: 0 % — SIGNIFICANT CHANGE UP (ref 0–6)
GLUCOSE SERPL-MCNC: 110 MG/DL — HIGH (ref 70–99)
HCT VFR BLD CALC: 31.7 % — LOW (ref 34.5–45)
HGB BLD-MCNC: 10.1 G/DL — LOW (ref 11.5–15.5)
LYMPHOCYTES # BLD AUTO: 1 K/UL — SIGNIFICANT CHANGE UP (ref 1–3.3)
LYMPHOCYTES # BLD AUTO: 10.4 % — LOW (ref 13–44)
MCHC RBC-ENTMCNC: 27.5 PG — SIGNIFICANT CHANGE UP (ref 27–34)
MCHC RBC-ENTMCNC: 31.8 GM/DL — LOW (ref 32–36)
MCV RBC AUTO: 86.6 FL — SIGNIFICANT CHANGE UP (ref 80–100)
MONOCYTES # BLD AUTO: 1 K/UL — HIGH (ref 0–0.9)
MONOCYTES NFR BLD AUTO: 10.2 % — HIGH (ref 1–9)
NEUTROPHILS # BLD AUTO: 7.4 K/UL — SIGNIFICANT CHANGE UP (ref 1.8–7.4)
NEUTROPHILS NFR BLD AUTO: 78.9 % — HIGH (ref 43–77)
PLATELET # BLD AUTO: 178 K/UL — SIGNIFICANT CHANGE UP (ref 150–400)
POTASSIUM SERPL-MCNC: 3.3 MMOL/L — LOW (ref 3.5–5.3)
POTASSIUM SERPL-SCNC: 3.3 MMOL/L — LOW (ref 3.5–5.3)
RBC # BLD: 3.66 M/UL — LOW (ref 3.8–5.2)
RBC # FLD: 13.4 % — SIGNIFICANT CHANGE UP (ref 10.3–14.5)
SODIUM SERPL-SCNC: 142 MMOL/L — SIGNIFICANT CHANGE UP (ref 135–145)
SPECIMEN SOURCE: SIGNIFICANT CHANGE UP
WBC # BLD: 9.6 K/UL — SIGNIFICANT CHANGE UP (ref 3.8–10.5)
WBC # FLD AUTO: 9.6 K/UL — SIGNIFICANT CHANGE UP (ref 3.8–10.5)

## 2017-12-03 PROCEDURE — 99233 SBSQ HOSP IP/OBS HIGH 50: CPT

## 2017-12-03 RX ORDER — ACETAMINOPHEN 500 MG
650 TABLET ORAL ONCE
Qty: 0 | Refills: 0 | Status: COMPLETED | OUTPATIENT
Start: 2017-12-03 | End: 2017-12-03

## 2017-12-03 RX ORDER — POTASSIUM CHLORIDE 20 MEQ
40 PACKET (EA) ORAL EVERY 4 HOURS
Qty: 0 | Refills: 0 | Status: COMPLETED | OUTPATIENT
Start: 2017-12-03 | End: 2017-12-03

## 2017-12-03 RX ORDER — SODIUM CHLORIDE 9 MG/ML
1000 INJECTION INTRAMUSCULAR; INTRAVENOUS; SUBCUTANEOUS
Qty: 0 | Refills: 0 | Status: DISCONTINUED | OUTPATIENT
Start: 2017-12-03 | End: 2017-12-03

## 2017-12-03 RX ORDER — ENOXAPARIN SODIUM 100 MG/ML
40 INJECTION SUBCUTANEOUS DAILY
Qty: 0 | Refills: 0 | Status: DISCONTINUED | OUTPATIENT
Start: 2017-12-03 | End: 2017-12-05

## 2017-12-03 RX ORDER — ACETAMINOPHEN 500 MG
650 TABLET ORAL EVERY 6 HOURS
Qty: 0 | Refills: 0 | Status: DISCONTINUED | OUTPATIENT
Start: 2017-12-03 | End: 2017-12-05

## 2017-12-03 RX ORDER — TRAMADOL HYDROCHLORIDE 50 MG/1
50 TABLET ORAL ONCE
Qty: 0 | Refills: 0 | Status: DISCONTINUED | OUTPATIENT
Start: 2017-12-03 | End: 2017-12-03

## 2017-12-03 RX ORDER — ONDANSETRON 8 MG/1
4 TABLET, FILM COATED ORAL ONCE
Qty: 0 | Refills: 0 | Status: COMPLETED | OUTPATIENT
Start: 2017-12-03 | End: 2017-12-03

## 2017-12-03 RX ORDER — SODIUM CHLORIDE 9 MG/ML
1000 INJECTION INTRAMUSCULAR; INTRAVENOUS; SUBCUTANEOUS ONCE
Qty: 0 | Refills: 0 | Status: COMPLETED | OUTPATIENT
Start: 2017-12-03 | End: 2017-12-03

## 2017-12-03 RX ORDER — INFLUENZA VIRUS VACCINE 15; 15; 15; 15 UG/.5ML; UG/.5ML; UG/.5ML; UG/.5ML
0.5 SUSPENSION INTRAMUSCULAR ONCE
Qty: 0 | Refills: 0 | Status: COMPLETED | OUTPATIENT
Start: 2017-12-03 | End: 2017-12-05

## 2017-12-03 RX ADMIN — TRAMADOL HYDROCHLORIDE 50 MILLIGRAM(S): 50 TABLET ORAL at 17:11

## 2017-12-03 RX ADMIN — PIPERACILLIN AND TAZOBACTAM 25 GRAM(S): 4; .5 INJECTION, POWDER, LYOPHILIZED, FOR SOLUTION INTRAVENOUS at 22:20

## 2017-12-03 RX ADMIN — PIPERACILLIN AND TAZOBACTAM 25 GRAM(S): 4; .5 INJECTION, POWDER, LYOPHILIZED, FOR SOLUTION INTRAVENOUS at 05:20

## 2017-12-03 RX ADMIN — Medication 40 MILLIEQUIVALENT(S): at 13:08

## 2017-12-03 RX ADMIN — SODIUM CHLORIDE 2000 MILLILITER(S): 9 INJECTION INTRAMUSCULAR; INTRAVENOUS; SUBCUTANEOUS at 00:18

## 2017-12-03 RX ADMIN — Medication 40 MILLIEQUIVALENT(S): at 17:11

## 2017-12-03 RX ADMIN — ENOXAPARIN SODIUM 40 MILLIGRAM(S): 100 INJECTION SUBCUTANEOUS at 12:50

## 2017-12-03 RX ADMIN — Medication 650 MILLIGRAM(S): at 12:49

## 2017-12-03 RX ADMIN — Medication 650 MILLIGRAM(S): at 06:28

## 2017-12-03 RX ADMIN — PIPERACILLIN AND TAZOBACTAM 25 GRAM(S): 4; .5 INJECTION, POWDER, LYOPHILIZED, FOR SOLUTION INTRAVENOUS at 13:07

## 2017-12-03 RX ADMIN — SODIUM CHLORIDE 75 MILLILITER(S): 9 INJECTION INTRAMUSCULAR; INTRAVENOUS; SUBCUTANEOUS at 01:39

## 2017-12-03 RX ADMIN — ONDANSETRON 4 MILLIGRAM(S): 8 TABLET, FILM COATED ORAL at 12:49

## 2017-12-03 NOTE — PROGRESS NOTE ADULT - PROBLEM SELECTOR PLAN 1
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

## 2017-12-03 NOTE — PATIENT PROFILE ADULT. - LANGUAGE ASSISTANCE NEEDED
Patient speaks English as well/No-Patient/Caregiver offered and refused free interpretation services.

## 2017-12-04 DIAGNOSIS — R51 HEADACHE: ICD-10-CM

## 2017-12-04 LAB
ALBUMIN SERPL ELPH-MCNC: 2.7 G/DL — LOW (ref 3.3–5)
ALP SERPL-CCNC: 61 U/L — SIGNIFICANT CHANGE UP (ref 40–120)
ALT FLD-CCNC: 44 U/L — SIGNIFICANT CHANGE UP (ref 12–78)
ANION GAP SERPL CALC-SCNC: 6 MMOL/L — SIGNIFICANT CHANGE UP (ref 5–17)
APPEARANCE UR: ABNORMAL
AST SERPL-CCNC: 27 U/L — SIGNIFICANT CHANGE UP (ref 15–37)
BACTERIA # UR AUTO: ABNORMAL
BILIRUB SERPL-MCNC: 0.2 MG/DL — SIGNIFICANT CHANGE UP (ref 0.2–1.2)
BILIRUB UR-MCNC: NEGATIVE — SIGNIFICANT CHANGE UP
BUN SERPL-MCNC: 4 MG/DL — LOW (ref 7–23)
CALCIUM SERPL-MCNC: 8.2 MG/DL — LOW (ref 8.5–10.1)
CHLORIDE SERPL-SCNC: 105 MMOL/L — SIGNIFICANT CHANGE UP (ref 96–108)
CO2 SERPL-SCNC: 27 MMOL/L — SIGNIFICANT CHANGE UP (ref 22–31)
COLOR SPEC: YELLOW — SIGNIFICANT CHANGE UP
CREAT SERPL-MCNC: 0.78 MG/DL — SIGNIFICANT CHANGE UP (ref 0.5–1.3)
DIFF PNL FLD: ABNORMAL
EPI CELLS # UR: SIGNIFICANT CHANGE UP
GLUCOSE SERPL-MCNC: 112 MG/DL — HIGH (ref 70–99)
GLUCOSE UR QL: NEGATIVE — SIGNIFICANT CHANGE UP
HCT VFR BLD CALC: 31.9 % — LOW (ref 34.5–45)
HGB BLD-MCNC: 10.1 G/DL — LOW (ref 11.5–15.5)
KETONES UR-MCNC: NEGATIVE — SIGNIFICANT CHANGE UP
LEUKOCYTE ESTERASE UR-ACNC: ABNORMAL
MCHC RBC-ENTMCNC: 27.3 PG — SIGNIFICANT CHANGE UP (ref 27–34)
MCHC RBC-ENTMCNC: 31.6 GM/DL — LOW (ref 32–36)
MCV RBC AUTO: 86.2 FL — SIGNIFICANT CHANGE UP (ref 80–100)
NITRITE UR-MCNC: NEGATIVE — SIGNIFICANT CHANGE UP
PH UR: 8 — SIGNIFICANT CHANGE UP (ref 5–8)
PLATELET # BLD AUTO: 226 K/UL — SIGNIFICANT CHANGE UP (ref 150–400)
POTASSIUM SERPL-MCNC: 3.9 MMOL/L — SIGNIFICANT CHANGE UP (ref 3.5–5.3)
POTASSIUM SERPL-SCNC: 3.9 MMOL/L — SIGNIFICANT CHANGE UP (ref 3.5–5.3)
PROCALCITONIN SERPL-MCNC: 0.11 NG/ML — HIGH (ref 0–0.04)
PROT SERPL-MCNC: 6.6 G/DL — SIGNIFICANT CHANGE UP (ref 6–8.3)
PROT UR-MCNC: NEGATIVE — SIGNIFICANT CHANGE UP
RBC # BLD: 3.7 M/UL — LOW (ref 3.8–5.2)
RBC # FLD: 13.4 % — SIGNIFICANT CHANGE UP (ref 10.3–14.5)
RBC CASTS # UR COMP ASSIST: ABNORMAL /HPF (ref 0–4)
SODIUM SERPL-SCNC: 138 MMOL/L — SIGNIFICANT CHANGE UP (ref 135–145)
SP GR SPEC: 1.01 — SIGNIFICANT CHANGE UP (ref 1.01–1.02)
UROBILINOGEN FLD QL: NEGATIVE — SIGNIFICANT CHANGE UP
WBC # BLD: 5.8 K/UL — SIGNIFICANT CHANGE UP (ref 3.8–10.5)
WBC # FLD AUTO: 5.8 K/UL — SIGNIFICANT CHANGE UP (ref 3.8–10.5)
WBC UR QL: ABNORMAL

## 2017-12-04 PROCEDURE — 99233 SBSQ HOSP IP/OBS HIGH 50: CPT

## 2017-12-04 RX ORDER — KETOROLAC TROMETHAMINE 30 MG/ML
30 SYRINGE (ML) INJECTION ONCE
Qty: 0 | Refills: 0 | Status: DISCONTINUED | OUTPATIENT
Start: 2017-12-04 | End: 2017-12-04

## 2017-12-04 RX ORDER — ONDANSETRON 8 MG/1
4 TABLET, FILM COATED ORAL ONCE
Qty: 0 | Refills: 0 | Status: COMPLETED | OUTPATIENT
Start: 2017-12-04 | End: 2017-12-04

## 2017-12-04 RX ORDER — SUMATRIPTAN SUCCINATE 4 MG/.5ML
25 INJECTION, SOLUTION SUBCUTANEOUS ONCE
Qty: 0 | Refills: 0 | Status: COMPLETED | OUTPATIENT
Start: 2017-12-04 | End: 2017-12-04

## 2017-12-04 RX ADMIN — PIPERACILLIN AND TAZOBACTAM 25 GRAM(S): 4; .5 INJECTION, POWDER, LYOPHILIZED, FOR SOLUTION INTRAVENOUS at 05:14

## 2017-12-04 RX ADMIN — ONDANSETRON 4 MILLIGRAM(S): 8 TABLET, FILM COATED ORAL at 02:58

## 2017-12-04 RX ADMIN — PIPERACILLIN AND TAZOBACTAM 25 GRAM(S): 4; .5 INJECTION, POWDER, LYOPHILIZED, FOR SOLUTION INTRAVENOUS at 13:20

## 2017-12-04 RX ADMIN — PIPERACILLIN AND TAZOBACTAM 25 GRAM(S): 4; .5 INJECTION, POWDER, LYOPHILIZED, FOR SOLUTION INTRAVENOUS at 21:51

## 2017-12-04 RX ADMIN — SUMATRIPTAN SUCCINATE 25 MILLIGRAM(S): 4 INJECTION, SOLUTION SUBCUTANEOUS at 22:50

## 2017-12-04 RX ADMIN — ENOXAPARIN SODIUM 40 MILLIGRAM(S): 100 INJECTION SUBCUTANEOUS at 11:13

## 2017-12-04 RX ADMIN — SUMATRIPTAN SUCCINATE 25 MILLIGRAM(S): 4 INJECTION, SOLUTION SUBCUTANEOUS at 21:51

## 2017-12-04 RX ADMIN — Medication 30 MILLIGRAM(S): at 04:20

## 2017-12-04 RX ADMIN — Medication 30 MILLIGRAM(S): at 04:01

## 2017-12-04 RX ADMIN — Medication 650 MILLIGRAM(S): at 00:57

## 2017-12-04 NOTE — PROGRESS NOTE ADULT - PROBLEM SELECTOR PLAN 1
recurrent pyelonephritis last hospitalized in 2015  - Abd/pelvis CT positive for pyelonephritis, UA+  - Continue zosyn  cultures negative  low grade fever last night  once afebrile for 24 hours d/c planning   given pt frequent history of pylo, would need outpt uro eval

## 2017-12-04 NOTE — DIETITIAN INITIAL EVALUATION ADULT. - MD RECOMMEND
Food preference set to manage food aversion. Pt has stable BP and no PMH of HLD or HTN. Pt will better benefit from a regular vegetarian diet. Food preference set to manage food aversion.

## 2017-12-04 NOTE — PROGRESS NOTE ADULT - PROBLEM SELECTOR PLAN 2
minimum relieve with NSAIDs  given tramadol, still pt complaining of headdache not resolving, will consult with neuro

## 2017-12-04 NOTE — DIETITIAN INITIAL EVALUATION ADULT. - ADHERENCE
good n/a/Pt prefers a vegetarian diet. As per pt  dietary recall PTA pt has a variety of foods covering all food groups

## 2017-12-04 NOTE — DIETITIAN INITIAL EVALUATION ADULT. - OTHER INFO
Pt had N/V PTA x 1 day. Had a vomit episode yesterday morning. Still feels nauseated. She doesn't want to consume meats due to Halal preference and nauseas. Preferrers a vegetarian diet. As per pt  dietary recall PTA pt has a variety of foods covering all food groups. Food preference were set to manage aversion. Pt had N/V PTA x 1 day. Had a vomit episode yesterday morning. Still feels nauseated, however, consuming >75%meals. She doesn't want to consume meats due to Halal preference and nauseas. Food preference were set to manage aversion. Pt stated UBW ~140lbs, as per bed scale current wt 144lbs. Per EMR no food allergies.

## 2017-12-04 NOTE — CHART NOTE - NSCHARTNOTEFT_GEN_A_CORE
RN called for Pt with an episode of emesis, nausea, headache, temp 100.3.   Pt examined at bedside. Pt states that she feels better after emesis. Pt has headache, frontal and occipital, similar to previous, going on for few days.  Pt has no acute complaints. Pt denies any blurry vision, HA, chest pain, n/v/d/c, f/u/d      Vital Signs - Last 24 Hrs    T(C): 37.9 (12-04-17 @ 00:39), Max: 38.6 (12-03-17 @ 06:04)  HR: 100 (12-04-17 @ 00:39) (75 - 100)  BP: 100/64 (12-04-17 @ 00:39) (100/64 - 124/72)  RR: 17 (12-04-17 @ 00:39) (17 - 18)  SpO2: 99% (12-04-17 @ 00:39) (97% - 100%)    I&O's Summary    02 Dec 2017 07:01  -  03 Dec 2017 07:00  --------------------------------------------------------  IN: 1390 mL / OUT: 0 mL / NET: 1390 mL    03 Dec 2017 07:01  -  04 Dec 2017 01:59  --------------------------------------------------------  IN: 460 mL / OUT: 0 mL / NET: 460 mL        General: Well developed, well nourished, NAD  HEENT: NCAT, PERRLA, EOMI bl, moist mucous membranes   Neck: Supple, nontender, no mass  Neurology: A&Ox3, nonfocal, CN II-XII grossly intact, sensation intact, no gait abnormalities   Respiratory: CTA B/L, No W/R/R  CV: RRR, +S1/S2, no murmurs, rubs or gallops  Abdominal: Soft, NT, ND +BS  Extremities: No C/C/E, + peripheral pulses  MSK: Normal ROM, no joint erythema or warmth, no joint swelling   Skin: warm, dry, normal color, no rash or abnormal lesions      A/P  41 y/o F with no significant PMH presents to the ED with fever x 6 days, admitted for sepsis 2/2 pyleonephritis.   -Pt is mildly febrile, given Tylenol dose PRN  -Ondansetron for nausea/vomiting  -Ketorolac for breakthrough HA pain  -will continue to monitor RN called for Pt with an episode of emesis, nausea, headache, temp 100.3.   Pt examined at bedside. Pt states that she feels better after emesis. Pt has headache, frontal and occipital, similar to previous, going on for few days.  Pt has no acute complaints. Pt denies any blurry vision, chest pain, n/v/d/c, f/u/d      Vital Signs - Last 24 Hrs    T(C): 37.9 (12-04-17 @ 00:39), Max: 38.6 (12-03-17 @ 06:04)  HR: 100 (12-04-17 @ 00:39) (75 - 100)  BP: 100/64 (12-04-17 @ 00:39) (100/64 - 124/72)  RR: 17 (12-04-17 @ 00:39) (17 - 18)  SpO2: 99% (12-04-17 @ 00:39) (97% - 100%)    I&O's Summary    02 Dec 2017 07:01  -  03 Dec 2017 07:00  --------------------------------------------------------  IN: 1390 mL / OUT: 0 mL / NET: 1390 mL    03 Dec 2017 07:01  -  04 Dec 2017 01:59  --------------------------------------------------------  IN: 460 mL / OUT: 0 mL / NET: 460 mL      General: Well developed, well nourished, NAD  HEENT: NCAT, PERRLA, EOMI bl, moist mucous membranes   Neck: Supple, nontender, no mass  Neurology: A&Ox3, nonfocal, CN II-XII grossly intact, sensation intact,    Respiratory: CTA B/L, No W/R/R  CV: RRR, +S1/S2, no murmurs, rubs or gallops  Abdominal: Soft, NT, ND +BS  Extremities: No C/C/E, + peripheral pulses  MSK: Normal ROM, no joint erythema or warmth, no joint swelling   Skin: warm, dry, normal color, no rash or abnormal lesions      A/P  41 y/o F with no significant PMH presents to the ED with fever x 6 days, admitted for sepsis 2/2 pyleonephritis.   -Pt is mildly febrile, given Tylenol dose PRN  -Ondansetron for nausea/vomiting  -Ketorolac for breakthrough HA pain  -will continue to monitor

## 2017-12-04 NOTE — PROVIDER CONTACT NOTE (OTHER) - ASSESSMENT
/64, RR 17, P 100 Temp 100.3, saturation 99% on Room air, patient verbalized nausea was relieved by episode of vomiting. Denies sob, chest pain, or pain generalized. No change in mental status, continues to be alert and oriented and cooperative.

## 2017-12-04 NOTE — DIETITIAN INITIAL EVALUATION ADULT. - PROBLEM SELECTOR PLAN 1
- Abd/pelvis CT positive for pyleonephritis, UA+  - Admit pt to [facility]  - Continue zosyn  f/u cultures  - Tylenol prn for fever  - Naproxen prn for pain  - F/u Urine Cx   - F/u Blood Cx  - F/u AM labs  IVF

## 2017-12-04 NOTE — CONSULT NOTE ADULT - SUBJECTIVE AND OBJECTIVE BOX
HPI:  43 y/o F with PMH of pyelonephritis in August 2017 presents to the ED with right flank pain,   fever with nausea.  Symptoms started 3 days ago. Pt reports temp 102.8 at home. Denies   dysuria or urinary symptoms. Denies abdominal pain. No recent travel or sick contacts.  In ER she had fever to 102. Complained of right flank pain. CT A/P showed omid perinephric  stranding Right worse than left. However UA negative and urine culture ngtd. States she was   antibiotic x 2 months and she was last on antibiotics 4 weeks ago.    Previous admission, ucx grew Staph Saprophyticus    Infectious Disease consult was called to evaluate pt and for antibiotic management.    Past Medical & Surgical Hx:  PAST MEDICAL & SURGICAL HISTORY:  Kidney infection  No significant past surgical history      Social History--  EtOH: denies   Tobacco: denies   Drug Use: denies   Lives with family    FAMILY HISTORY:  Family history of diabetes mellitus in first degree relative (Sibling): Mother and Sister      Allergies  No Known Allergies    Home Medications:  NONE    Current Inpatient Medications :    ANTIBIOTICS:   piperacillin/tazobactam IVPB. 3.375 Gram(s) IV Intermittent every 8 hours      OTHER RELEVANT MEDICATIONS :  acetaminophen   Tablet 650 milliGRAM(s) Oral every 6 hours PRN  enoxaparin Injectable 40 milliGRAM(s) SubCutaneous daily  influenza   Vaccine 0.5 milliLiter(s) IntraMuscular once    ROS:  CONSTITUTIONAL:  + fever No chills,   EYES:  Negative  blurry vision or double vision  CARDIOVASCULAR:  Negative for chest pain or palpitations  RESPIRATORY:  Negative for cough, wheezing, or SOB   GASTROINTESTINAL:  Negative for nausea, vomiting, diarrhea, constipation, or abdominal pain  GENITOURINARY:  Negative frequency, urgency , dysuria or hematuria +right flank pain  NEUROLOGIC:  No headache, confusion, dizziness, lightheadedness  All other systems were reviewed and are negative      I&O's Detail    03 Dec 2017 07:01  -  04 Dec 2017 07:00  --------------------------------------------------------  IN:    Oral Fluid: 360 mL    Solution: 200 mL  Total IN: 560 mL    OUT:  Total OUT: 0 mL    Total NET: 560 mL    Physical Exam:  Vital Signs Last 24 Hrs  T(C): 36.8 (04 Dec 2017 14:14), Max: 37.9 (04 Dec 2017 00:39)  T(F): 98.2 (04 Dec 2017 14:14), Max: 100.3 (04 Dec 2017 00:39)  HR: 78 (04 Dec 2017 14:14) (74 - 100)  BP: 113/71 (04 Dec 2017 14:14) (90/57 - 113/71)  BP(mean): --  RR: 17 (04 Dec 2017 14:14) (17 - 17)  SpO2: 100% (04 Dec 2017 14:14) (94% - 100%)      General: no acute distress  Eyes: sclera anicteric, pupils equal and reactive to light  ENMT: buccal mucosa moist, pharynx not injected  Neck: supple, trachea midline  Lungs: clear, no wheeze/rhonchi  Cardiovascular: regular rate and rhythm, S1 S2  Abdomen: soft, nontender, no organomegaly present, bowel sounds normal  Right flank tenderness  Neurological:  alert and oriented x3, Cranial Nerves II-XII grossly intact  Skin: no rash  Lymph Nodes: no palpable cervical/supraclavicular lymph nodes enlargements  Extremities: no cyanosis/clubbing/edema    Labs:                         10.1   5.8   )-----------( 226      ( 04 Dec 2017 06:41 )             31.9   12-04    138  |  105  |  4<L>  ----------------------------<  112<H>  3.9   |  27  |  0.78    Ca    8.2<L>      04 Dec 2017 06:41    TPro  6.6  /  Alb  2.7<L>  /  TBili  0.2  /  DBili  x   /  AST  27  /  ALT  44  /  AlkPhos  61  12-04      RECENT CULTURES:    Culture - Urine (collected 03 Dec 2017 00:01)  Source: .Urine Clean Catch (Midstream)  Final Report (03 Dec 2017 22:24):    <10,000 CFU/ml    Normal Urogenital mena present    Culture - Blood (collected 02 Dec 2017 23:39)  Source: .Blood Blood-Venous  Preliminary Report (04 Dec 2017 01:02):    No growth to date.    Culture - Blood (collected 02 Dec 2017 23:39)  Source: .Blood Blood-Venous  Preliminary Report (04 Dec 2017 01:02):    No growth to date.    RADIOLOGY & ADDITIONAL STUDIES:  EXAM:  CT ABDOMEN AND PELVIS IC                            PROCEDURE DATE:  12/02/2017        INTERPRETATION:  CT ABDOMEN AND PELVIS DATED December 2, 2017.    CLINICAL INFORMATION:  Right flank pain and fever.    TECHNIQUE:  Axial CT images through the abdomen and pelvis are acquired   with administration of intravenous contrast. Images are reformatted in   the sagittal and coronal planes. 95cc of Omnipaque 350 were administered   without event.  5cc were discarded.    The study is correlated with a prior exam from August 28, 2017.    FINDINGS:    The lung bases are clear.    The liver, gallbladder, biliary tree, pancreas, spleen, and adrenal   glands are unremarkable. There are cortical enhancement defects in both   kidneys (right worsethan left) which are most compatible with   pyelonephritis. There is mild asymmetric right-sided perinephric   stranding. There is no drainable fluid collection. There is no right or   left hydronephrosis. There is mild bilateral urothelial enhancement. The   urinary bladder is distended with mild thickening of the walls. The   uterus and adnexa are unremarkable apart from a 2.2 cm right ovarian cyst.    There is no bowel obstruction, overt bowel wall thickening, or mesenteric   inflammatory change. A normal appendix is identified. There is no   pneumoperitoneum, ascites, or loculated collection.    There are scattered subcentimeter retroperitoneal lymph nodes which may   be reactive. The abdominal aorta is normal in caliber.    There is a tiny fat-containing umbilical hernia.    The osseous structures are unremarkable apart from minor degenerative   changes of the spine.    IMPRESSION:    Findings favoring cystitis, ascending urinary tract infection, and   pyelonephritis (right worse than left).    Right ovarian cyst measuring up to 2.2 cm.      Assessment :   42 year old F with hx pyelonephritis in 9/17 admitted with right flank pain   with fever and nausea   Recurrent pyelonephritis? UA is negative and culture ngtd  Previous admission, ucx grew Staph Saprophyticus and was treated with Augmentin  Right flank pain better but still present    Plan :   Get renal ultrasound to rule out complex cyst  Cont Zosyn for now  Repeat UA C&S    D/w Dr Fuller    Continue with present regiment .  Appropriate use of antibiotics and adverse effects reviewed.    I have discussed the above plan of care with patient/family in detail. They expressed understanding of the treatment plan . Risks, benefits and alternatives discussed in detail.   I have asked if they have any questions or concerns and appropriately addressed them to the best of my ability .    > 45 minutes spent in direct patient care reviewing  the notes, lab data/ imaging , discussion with multidisciplinary team. All questions were addressed and answered to the best of my capacity .    Thank you for allowing me to participate in the care of your patient .      Jayne Garland MD  Infectious Disease  [phone] HPI:  43 y/o F with PMH of pyelonephritis in August 2017 presents to the ED with right flank pain,   fever with nausea.  Symptoms started 3 days ago. Pt reports temp 102.8 at home. Denies   dysuria or urinary symptoms. Denies abdominal pain. No recent travel or sick contacts.  In ER she had fever to 102. Complained of right flank pain. CT A/P showed omid perinephric  stranding Right worse than left. However UA negative and urine culture ngtd. States she was   antibiotic x 2 months and she was last on antibiotics 4 weeks ago.    Previous admission, ucx grew Staph Saprophyticus    Infectious Disease consult was called to evaluate pt and for antibiotic management.    Past Medical & Surgical Hx:  PAST MEDICAL & SURGICAL HISTORY:  Kidney infection  No significant past surgical history      Social History--  EtOH: denies   Tobacco: denies   Drug Use: denies   Lives with family    FAMILY HISTORY:  Family history of diabetes mellitus in first degree relative (Sibling): Mother and Sister      Allergies  No Known Allergies    Home Medications:  NONE    Current Inpatient Medications :    ANTIBIOTICS:   piperacillin/tazobactam IVPB. 3.375 Gram(s) IV Intermittent every 8 hours      OTHER RELEVANT MEDICATIONS :  acetaminophen   Tablet 650 milliGRAM(s) Oral every 6 hours PRN  enoxaparin Injectable 40 milliGRAM(s) SubCutaneous daily  influenza   Vaccine 0.5 milliLiter(s) IntraMuscular once    ROS:  CONSTITUTIONAL:  + fever No chills,   EYES:  Negative  blurry vision or double vision  CARDIOVASCULAR:  Negative for chest pain or palpitations  RESPIRATORY:  Negative for cough, wheezing, or SOB   GASTROINTESTINAL:  Negative for nausea, vomiting, diarrhea, constipation, or abdominal pain  GENITOURINARY:  Negative frequency, urgency , dysuria or hematuria +right flank pain  NEUROLOGIC:  No headache, confusion, dizziness, lightheadedness  All other systems were reviewed and are negative      I&O's Detail    03 Dec 2017 07:01  -  04 Dec 2017 07:00  --------------------------------------------------------  IN:    Oral Fluid: 360 mL    Solution: 200 mL  Total IN: 560 mL    OUT:  Total OUT: 0 mL    Total NET: 560 mL    Physical Exam:  Vital Signs Last 24 Hrs  T(C): 36.8 (04 Dec 2017 14:14), Max: 37.9 (04 Dec 2017 00:39)  T(F): 98.2 (04 Dec 2017 14:14), Max: 100.3 (04 Dec 2017 00:39)  HR: 78 (04 Dec 2017 14:14) (74 - 100)  BP: 113/71 (04 Dec 2017 14:14) (90/57 - 113/71)  BP(mean): --  RR: 17 (04 Dec 2017 14:14) (17 - 17)  SpO2: 100% (04 Dec 2017 14:14) (94% - 100%)      General: no acute distress  Eyes: sclera anicteric, pupils equal and reactive to light  ENMT: buccal mucosa moist, pharynx not injected  Neck: supple, trachea midline  Lungs: clear, no wheeze/rhonchi  Cardiovascular: regular rate and rhythm, S1 S2  Abdomen: soft, nontender, no organomegaly present, bowel sounds normal  Right flank tenderness  Neurological:  alert and oriented x3, Cranial Nerves II-XII grossly intact  Skin: no rash  Lymph Nodes: no palpable cervical/supraclavicular lymph nodes enlargements  Extremities: no cyanosis/clubbing/edema    Labs:                         10.1   5.8   )-----------( 226      ( 04 Dec 2017 06:41 )             31.9   12-04    138  |  105  |  4<L>  ----------------------------<  112<H>  3.9   |  27  |  0.78    Ca    8.2<L>      04 Dec 2017 06:41    TPro  6.6  /  Alb  2.7<L>  /  TBili  0.2  /  DBili  x   /  AST  27  /  ALT  44  /  AlkPhos  61  12-04      RECENT CULTURES:    Culture - Urine (collected 03 Dec 2017 00:01)  Source: .Urine Clean Catch (Midstream)  Final Report (03 Dec 2017 22:24):    <10,000 CFU/ml    Normal Urogenital mena present    Culture - Blood (collected 02 Dec 2017 23:39)  Source: .Blood Blood-Venous  Preliminary Report (04 Dec 2017 01:02):    No growth to date.    Culture - Blood (collected 02 Dec 2017 23:39)  Source: .Blood Blood-Venous  Preliminary Report (04 Dec 2017 01:02):    No growth to date.    RADIOLOGY & ADDITIONAL STUDIES:  EXAM:  CT ABDOMEN AND PELVIS IC                            PROCEDURE DATE:  12/02/2017        INTERPRETATION:  CT ABDOMEN AND PELVIS DATED December 2, 2017.    CLINICAL INFORMATION:  Right flank pain and fever.    TECHNIQUE:  Axial CT images through the abdomen and pelvis are acquired   with administration of intravenous contrast. Images are reformatted in   the sagittal and coronal planes. 95cc of Omnipaque 350 were administered   without event.  5cc were discarded.    The study is correlated with a prior exam from August 28, 2017.    FINDINGS:    The lung bases are clear.    The liver, gallbladder, biliary tree, pancreas, spleen, and adrenal   glands are unremarkable. There are cortical enhancement defects in both   kidneys (right worsethan left) which are most compatible with   pyelonephritis. There is mild asymmetric right-sided perinephric   stranding. There is no drainable fluid collection. There is no right or   left hydronephrosis. There is mild bilateral urothelial enhancement. The   urinary bladder is distended with mild thickening of the walls. The   uterus and adnexa are unremarkable apart from a 2.2 cm right ovarian cyst.    There is no bowel obstruction, overt bowel wall thickening, or mesenteric   inflammatory change. A normal appendix is identified. There is no   pneumoperitoneum, ascites, or loculated collection.    There are scattered subcentimeter retroperitoneal lymph nodes which may   be reactive. The abdominal aorta is normal in caliber.    There is a tiny fat-containing umbilical hernia.    The osseous structures are unremarkable apart from minor degenerative   changes of the spine.    IMPRESSION:    Findings favoring cystitis, ascending urinary tract infection, and   pyelonephritis (right worse than left).    Right ovarian cyst measuring up to 2.2 cm.      Assessment :   42 year old F with hx pyelonephritis in 9/17 admitted with right flank pain   with fever and nausea   Recurrent pyelonephritis? Infected renal cyst? UA is negative and culture ngtd  Previous admission, ucx grew Staph Saprophyticus and was treated with Augmentin  Right flank pain better but still present    Plan :   Get renal ultrasound to rule out complex infected cyst  Cont Zosyn for now  Repeat UA C&S    D/w Dr Fuller    Continue with present regiment .  Appropriate use of antibiotics and adverse effects reviewed.    I have discussed the above plan of care with patient/family in detail. They expressed understanding of the treatment plan . Risks, benefits and alternatives discussed in detail.   I have asked if they have any questions or concerns and appropriately addressed them to the best of my ability .    > 45 minutes spent in direct patient care reviewing  the notes, lab data/ imaging , discussion with multidisciplinary team. All questions were addressed and answered to the best of my capacity .    Thank you for allowing me to participate in the care of your patient .      Jayne Garland MD  Infectious Disease  [phone]

## 2017-12-04 NOTE — PROGRESS NOTE ADULT - ASSESSMENT
41 y/o F with no significant PMH presents to the ED with fever x 6 days, admitted for sepsis 2/2 pyelonephritis

## 2017-12-04 NOTE — PROVIDER CONTACT NOTE (OTHER) - SITUATION
Patient had an episode of  emesis, nausea, headache, temp 100.3. Verbalized she feels better after vomiting.

## 2017-12-05 ENCOUNTER — TRANSCRIPTION ENCOUNTER (OUTPATIENT)
Age: 42
End: 2017-12-05

## 2017-12-05 VITALS
TEMPERATURE: 98 F | OXYGEN SATURATION: 100 % | RESPIRATION RATE: 16 BRPM | SYSTOLIC BLOOD PRESSURE: 97 MMHG | DIASTOLIC BLOOD PRESSURE: 66 MMHG | HEART RATE: 74 BPM

## 2017-12-05 LAB
CULTURE RESULTS: NO GROWTH — SIGNIFICANT CHANGE UP
HCT VFR BLD CALC: 34.4 % — LOW (ref 34.5–45)
HGB BLD-MCNC: 10.9 G/DL — LOW (ref 11.5–15.5)
MCHC RBC-ENTMCNC: 27.2 PG — SIGNIFICANT CHANGE UP (ref 27–34)
MCHC RBC-ENTMCNC: 31.8 GM/DL — LOW (ref 32–36)
MCV RBC AUTO: 85.5 FL — SIGNIFICANT CHANGE UP (ref 80–100)
PLATELET # BLD AUTO: 292 K/UL — SIGNIFICANT CHANGE UP (ref 150–400)
RBC # BLD: 4.02 M/UL — SIGNIFICANT CHANGE UP (ref 3.8–5.2)
RBC # FLD: 13.1 % — SIGNIFICANT CHANGE UP (ref 10.3–14.5)
SPECIMEN SOURCE: SIGNIFICANT CHANGE UP
WBC # BLD: 7.2 K/UL — SIGNIFICANT CHANGE UP (ref 3.8–10.5)
WBC # FLD AUTO: 7.2 K/UL — SIGNIFICANT CHANGE UP (ref 3.8–10.5)

## 2017-12-05 PROCEDURE — 84145 PROCALCITONIN (PCT): CPT

## 2017-12-05 PROCEDURE — 82150 ASSAY OF AMYLASE: CPT

## 2017-12-05 PROCEDURE — 76775 US EXAM ABDO BACK WALL LIM: CPT | Mod: 26

## 2017-12-05 PROCEDURE — 96375 TX/PRO/DX INJ NEW DRUG ADDON: CPT

## 2017-12-05 PROCEDURE — 76770 US EXAM ABDO BACK WALL COMP: CPT | Mod: 26

## 2017-12-05 PROCEDURE — 80048 BASIC METABOLIC PNL TOTAL CA: CPT

## 2017-12-05 PROCEDURE — 76770 US EXAM ABDO BACK WALL COMP: CPT

## 2017-12-05 PROCEDURE — 85027 COMPLETE CBC AUTOMATED: CPT

## 2017-12-05 PROCEDURE — 80053 COMPREHEN METABOLIC PANEL: CPT

## 2017-12-05 PROCEDURE — 99285 EMERGENCY DEPT VISIT HI MDM: CPT | Mod: 25

## 2017-12-05 PROCEDURE — 76775 US EXAM ABDO BACK WALL LIM: CPT

## 2017-12-05 PROCEDURE — 83605 ASSAY OF LACTIC ACID: CPT

## 2017-12-05 PROCEDURE — 81001 URINALYSIS AUTO W/SCOPE: CPT

## 2017-12-05 PROCEDURE — 71045 X-RAY EXAM CHEST 1 VIEW: CPT

## 2017-12-05 PROCEDURE — 90686 IIV4 VACC NO PRSV 0.5 ML IM: CPT

## 2017-12-05 PROCEDURE — 87040 BLOOD CULTURE FOR BACTERIA: CPT

## 2017-12-05 PROCEDURE — 93005 ELECTROCARDIOGRAM TRACING: CPT

## 2017-12-05 PROCEDURE — 87086 URINE CULTURE/COLONY COUNT: CPT

## 2017-12-05 PROCEDURE — 83690 ASSAY OF LIPASE: CPT

## 2017-12-05 PROCEDURE — 99239 HOSP IP/OBS DSCHRG MGMT >30: CPT

## 2017-12-05 PROCEDURE — 96365 THER/PROPH/DIAG IV INF INIT: CPT

## 2017-12-05 PROCEDURE — 74177 CT ABD & PELVIS W/CONTRAST: CPT

## 2017-12-05 RX ORDER — SUMATRIPTAN SUCCINATE 4 MG/.5ML
25 INJECTION, SOLUTION SUBCUTANEOUS ONCE
Qty: 0 | Refills: 0 | Status: COMPLETED | OUTPATIENT
Start: 2017-12-05 | End: 2017-12-05

## 2017-12-05 RX ORDER — CEFUROXIME AXETIL 250 MG
2 TABLET ORAL
Qty: 10 | Refills: 0 | OUTPATIENT
Start: 2017-12-05 | End: 2017-12-10

## 2017-12-05 RX ORDER — SUMATRIPTAN SUCCINATE 4 MG/.5ML
1 INJECTION, SOLUTION SUBCUTANEOUS
Qty: 6 | Refills: 0 | OUTPATIENT
Start: 2017-12-05 | End: 2017-12-11

## 2017-12-05 RX ORDER — SUMATRIPTAN SUCCINATE 4 MG/.5ML
25 INJECTION, SOLUTION SUBCUTANEOUS ONCE
Qty: 0 | Refills: 0 | Status: DISCONTINUED | OUTPATIENT
Start: 2017-12-05 | End: 2017-12-05

## 2017-12-05 RX ADMIN — SUMATRIPTAN SUCCINATE 25 MILLIGRAM(S): 4 INJECTION, SOLUTION SUBCUTANEOUS at 06:50

## 2017-12-05 RX ADMIN — SUMATRIPTAN SUCCINATE 25 MILLIGRAM(S): 4 INJECTION, SOLUTION SUBCUTANEOUS at 11:49

## 2017-12-05 RX ADMIN — PIPERACILLIN AND TAZOBACTAM 25 GRAM(S): 4; .5 INJECTION, POWDER, LYOPHILIZED, FOR SOLUTION INTRAVENOUS at 05:19

## 2017-12-05 RX ADMIN — INFLUENZA VIRUS VACCINE 0.5 MILLILITER(S): 15; 15; 15; 15 SUSPENSION INTRAMUSCULAR at 12:05

## 2017-12-05 RX ADMIN — SUMATRIPTAN SUCCINATE 25 MILLIGRAM(S): 4 INJECTION, SOLUTION SUBCUTANEOUS at 06:08

## 2017-12-05 NOTE — PROGRESS NOTE ADULT - SUBJECTIVE AND OBJECTIVE BOX
LEYDA COLEMAN is a 42yFemale , patient examined and chart reviewed.     INTERVAL HPI/ OVERNIGHT EVENTS:   Afebrile. Feels better.  Right flank pain much improved.    PAST MEDICAL & SURGICAL HISTORY:  Kidney infection  No significant past surgical history      For details regarding the patient's social history, family history, and other miscellaneous elements, please refer the initial infectious diseases consultation and/or the admitting history and physical examination for this admission.     ROS:  CONSTITUTIONAL:  Negative fever or chills  EYES:  Negative  blurry vision or double vision  CARDIOVASCULAR:  Negative for chest pain or palpitations  RESPIRATORY:  Negative for cough, wheezing, or SOB   GASTROINTESTINAL:  Negative for nausea, vomiting, diarrhea, constipation, or abdominal pain  GENITOURINARY:  Negative frequency, urgency or dysuria  NEUROLOGIC:  No headache, confusion, dizziness, lightheadedness  All other systems were reviewed and are negative       Current inpatient medications :    ANTIBIOTICS/RELEVANT:  piperacillin/tazobactam IVPB. 3.375 Gram(s) IV Intermittent every 8 hours      acetaminophen   Tablet 650 milliGRAM(s) Oral every 6 hours PRN  enoxaparin Injectable 40 milliGRAM(s) SubCutaneous daily      Objective:     @ 07:01  -   @ 07:00  --------------------------------------------------------  IN: 540 mL / OUT: 0 mL / NET: 540 mL      T(C): 36.6 (17 @ 05:25), Max: 37.6 (17 @ 17:50)  HR: 74 (17 @ 05:25) (74 - 82)  BP: 97/66 (17 @ 05:25) (96/64 - 113/71)  RR: 16 (17 @ 05:25) (16 - 17)  SpO2: 100% (17 @ 05:25) (94% - 100%)  Wt(kg): --      Physical Exam:  General: well developed well nourished, in no acute distress  Eyes: sclera anicteric, pupils equal and reactive to light  ENMT: buccal mucosa moist, pharynx not injected  Neck: supple, trachea midline  Lungs: clear, no wheeze/rhonchi  Cardiovascular: regular rate and rhythm, S1 S2  Abdomen: soft, nontender, no organomegaly present, bowel sounds normal  Minimal right flank pain  Neurological: alert and oriented x3, Cranial Nerves II-XII grossly intact  Skin: no increased ecchymosis/petechiae/purpura  Lymph Nodes: no palpable cervical/supraclavicular lymph nodes enlargements  Extremities: no cyanosis/clubbing/edema      LABS:                          10.9   7.2   )-----------( 292      ( 05 Dec 2017 07:17 )             34.4       12    138  |  105  |  4<L>  ----------------------------<  112<H>  3.9   |  27  |  0.78    Ca    8.2<L>      04 Dec 2017 06:41    TPro  6.6  /  Alb  2.7<L>  /  TBili  0.2  /  DBili  x   /  AST  27  /  ALT  44  /  AlkPhos  61  12        Urinalysis Basic - ( 04 Dec 2017 16:09 )    Color: Yellow / Appearance: Slightly Turbid / S.010 / pH: x  Gluc: x / Ketone: Negative  / Bili: Negative / Urobili: Negative   Blood: x / Protein: Negative / Nitrite: Negative   Leuk Esterase: Trace / RBC: 11-25 /HPF / WBC 6-10   Sq Epi: x / Non Sq Epi: Occasional / Bacteria: Occasional    MICROBIOLOGY:    Culture - Urine (collected 03 Dec 2017 00:01)  Source: .Urine Clean Catch (Midstream)  Final Report (03 Dec 2017 22:24):    <10,000 CFU/ml    Normal Urogenital mena present    Culture - Blood (collected 02 Dec 2017 23:39)  Source: .Blood Blood-Venous  Preliminary Report (04 Dec 2017 01:02):    No growth to date.    Culture - Blood (collected 02 Dec 2017 23:39)  Source: .Blood Blood-Venous  Preliminary Report (04 Dec 2017 01:02):    No growth to date.    RADIOLOGY & ADDITIONAL STUDIES:  EXAM:  US KIDNEY(S)                            PROCEDURE DATE:  2017          INTERPRETATION:  RENAL ULTRASOUND    CLINICAL INFORMATION:  Right flank pain and fever.    PROCEDURE:  Real-time ultrasound images of the kidneys were performed by   technologist and made available for review.    COMPARISONS: CT scan abdomen and pelvis 2017.    FINDINGS:   The right and left kidneys measure approximately 11 cm and 11   cm, respectively in length.     There is mild fullness of the left renalpelvis.    There is an approximately 7 x 4 mm nonshadowing echogenic focus at the   midpole of the right kidney, finding could represent a small   angiomyolipoma. No definite shadowing calculus is noted. No right-sided   hydronephrosis is noted.    No perinephric fluid collection is noted.    The urinary bladder is nondistended.    Impression:    Mild fullness left renal pelvis.    Other findings as discussed above.      Assessment :  42 year old F with hx pyelonephritis in  admitted with right flank pain   with fever and nausea   Recurrent pyelonephritis? UA is negative and culture ngtd  Previous admission, ucx grew Staph Saprophyticus and was treated with Augmentin  Right flank pain better   Seen by urology  Renal ultrasound noted  Overall clinically better    Plan :   On Zosyn  Can dc home on Ceftin x 7 -10 days  Fu urology    D/w Dr Fuller    Continue with present regiment.  Appropriate use of antibiotics and adverse effects reviewed.    I have discussed the above plan of care with patient and  in detail. They expressed understanding of the  treatment plan . Risks, benefits and alternatives discussed in detail. I have asked if they have any questions or concerns and appropriately addressed them to the best of my ability .    > 35 minutes were spent in direct patient care reviewing notes, medications ,labs data/ imaging , discussion with multidisciplinary team.    Thank you for allowing me to participate in care of your patient .    Jayne Garland MD  Infectious Disease  [phone]
Neurology follow up note    LEYDA [id number]      Interval History:    Patient seen with spouse headaches better after Imitrex     MEDICATIONS    acetaminophen   Tablet 650 milliGRAM(s) Oral every 6 hours PRN  enoxaparin Injectable 40 milliGRAM(s) SubCutaneous daily  piperacillin/tazobactam IVPB. 3.375 Gram(s) IV Intermittent every 8 hours      Allergies    No Known Allergies    Intolerances            Vital Signs Last 24 Hrs  T(C): 36.6 (05 Dec 2017 05:25), Max: 37.6 (04 Dec 2017 17:50)  T(F): 97.8 (05 Dec 2017 05:25), Max: 99.6 (04 Dec 2017 17:50)  HR: 74 (05 Dec 2017 05:25) (74 - 82)  BP: 97/66 (05 Dec 2017 05:25) (96/64 - 113/71)  BP(mean): --  RR: 16 (05 Dec 2017 05:25) (16 - 17)  SpO2: 100% (05 Dec 2017 05:25) (94% - 100%)      REVIEW OF SYSTEMS:     Constitutional: No fever, chills, fatigue, weakness  Eyes: no eye pain, visual disturbances, or discharge  ENT:  No difficulty hearing, tinnitus, vertigo; No sinus or throat pain  Neck: No pain or stiffness  Respiratory: No cough, dyspnea, wheezing   Cardiovascular: No chest pain, palpitations,   Gastrointestinal: No abdominal or epigastric pain. No nausea, vomiting  No diarrhea or constipation.   Genitourinary: No dysuria, frequency, hematuria or incontinence  Neurological: overall less headaches, no lightheadedness, vertigo, numbness or tremors  Psychiatric: No depression, anxiety, mood swings or difficulty sleeping  Musculoskeletal: No joint pain or swelling; No muscle, back or extremity pain  Skin: No itching, burning, rashes or lesions   Lymph Nodes: No enlarged glands  Endocrine: No heat or cold intolerance; No hair loss   Allergy and Immunologic: No hives or eczema    On Neurological Examination:    Mental Status - Patient is alert, awake, oriented X3.       Follow simple commands  Follow complex commands    Speech -   Fluent                       Cranial Nerves - Pupils 3 mm equal and reactive to light,   extraocular eye movements intact.   smile symmetric  intact bilateral NLF    Motor Exam -   Right upper 5/5  Left upper 5/5  Right lower 5/5  Left lower 5/5      Muscle tone - is normal all over.  No asymmetry is seen.      Sensory    Bilateral intact to light touch    Gait -  normal      GENERAL Exam: Nontoxic , No Acute Distress   	  HEENT:  normocephalic, atraumatic  		  LUNGS: Clear bilaterally    	  HEART: Normal S1S2   No murmur RRR        	  GI/ ABDOMEN:  Soft  Non tender    EXTREMITIES:   No Edema  No Clubbing  No Cyanosis No Edema    MUSCULOSKELETAL: Normal Range of Motion   	   SKIN: Normal  No Ecchymosis     Neck supple              LABS:  CBC Full  -  ( 05 Dec 2017 07:17 )  WBC Count : 7.2 K/uL  Hemoglobin : 10.9 g/dL  Hematocrit : 34.4 %  Platelet Count - Automated : 292 K/uL  Mean Cell Volume : 85.5 fl  Mean Cell Hemoglobin : 27.2 pg  Mean Cell Hemoglobin Concentration : 31.8 gm/dL  Auto Neutrophil # : x  Auto Lymphocyte # : x  Auto Monocyte # : x  Auto Eosinophil # : x  Auto Basophil # : x  Auto Neutrophil % : x  Auto Lymphocyte % : x  Auto Monocyte % : x  Auto Eosinophil % : x  Auto Basophil % : x    Urinalysis Basic - ( 04 Dec 2017 16:09 )    Color: Yellow / Appearance: Slightly Turbid / S.010 / pH: x  Gluc: x / Ketone: Negative  / Bili: Negative / Urobili: Negative   Blood: x / Protein: Negative / Nitrite: Negative   Leuk Esterase: Trace / RBC: 11-25 /HPF / WBC 6-10   Sq Epi: x / Non Sq Epi: Occasional / Bacteria: Occasional      12-    138  |  105  |  4<L>  ----------------------------<  112<H>  3.9   |  27  |  0.78    Ca    8.2<L>      04 Dec 2017 06:41    TPro  6.6  /  Alb  2.7<L>  /  TBili  0.2  /  DBili  x   /  AST  27  /  ALT  44  /  AlkPhos  61  12-04    Hemoglobin A1C:     LIVER FUNCTIONS - ( 04 Dec 2017 06:41 )  Alb: 2.7 g/dL / Pro: 6.6 g/dL / ALK PHOS: 61 U/L / ALT: 44 U/L / AST: 27 U/L / GGT: x           Vitamin B12         RADIOLOGY    ANALYSIS AND PLAN:  This is a 42-year-old with a history of headache.  1.	Headache is most likely secondary to medical issues, fever.  Doubt that this is meningitis type of picture.  I will try the patient on Imitrex for headache overall better  2.	follow up with dr aquino  3.	spoke to pt and spouse   4.	For pyelonephritis, continue with antibiotics.  5.	I will continue to follow.    Thank you for the courtesy of consultation.      Physical therapy evaluation.  OOB to chair/ambulation with assistance only.  Advanced care planning was discussed with family.  Pain is accessed and addressed.  Pt was screened for signs of clinical depression. Appropriate referral made.  Risk of falls accessed. Fall prevention and plan of care was discussed with family.  Pt is screened for tobacco and alcohol use. Counseling was done for smoking and alcohol cessation.  Use of narcotic pain meds was discussed Pt is advised to use narcotic meds wisely and to refrain from over using them.  Plan of care was discussed with family. Questions answered.  Would continue to follow.   Greater than 45 minutes spent in direct patient care reviewing  the notes, lab data/ imaging , discussion with multidisciplinary team.
Patient is a 42y old  Female who presents with a chief complaint of fever (02 Dec 2017 21:46)        HPI:  41 y/o F with PMH of pyelonephritis in 2017 presents to the ED with flank pain.  Symptoms started 2 days ago.  Pain is dull achy in nature, which started on the right flank but now radiates to the front of the abdomen.  Pt has only tried motrin today for pain relief.  Pt reports temp 102.8 at home. Pt said she felt nausea, but no vomiting.  Reports headache and mild dizziness (02 Dec 2017 21:46)      SUBJECTIVE & OBJECTIVE: Pt seen and examined at bedside.     PHYSICAL EXAM:  T(C): 36.5 (17 @ 05:25), Max: 37.9 (17 @ 00:39)  HR: 75 (17 @ 05:25) (74 - 100)  BP: 90/57 (17 @ 05:25) (90/57 - 124/72)  RR: 17 (17 @ 05:25) (17 - 18)  SpO2: 94% (17 @ 05:25) (94% - 100%)  Wt(kg): --   GENERAL: NAD, well-groomed, well-developed  HEAD:  Atraumatic, Normocephalic  EYES: EOMI, PERRLA, conjunctiva and sclera clear  ENMT: Moist mucous membranes  NECK: Supple, No JVD  NERVOUS SYSTEM:  Alert & Oriented X3, Motor Strength 5/5 B/L upper and lower extremities; DTRs 2+ intact and symmetric  CHEST/LUNG: Clear to auscultation bilaterally; No rales, rhonchi, wheezing, or rubs  HEART: Regular rate and rhythm; No murmurs, rubs, or gallops  ABDOMEN: Soft, Nontender, Nondistended; Bowel sounds present  EXTREMITIES:  2+ Peripheral Pulses, No clubbing, cyanosis, or edema        MEDICATIONS  (STANDING):  enoxaparin Injectable 40 milliGRAM(s) SubCutaneous daily  influenza   Vaccine 0.5 milliLiter(s) IntraMuscular once  piperacillin/tazobactam IVPB. 3.375 Gram(s) IV Intermittent every 8 hours    MEDICATIONS  (PRN):  acetaminophen   Tablet 650 milliGRAM(s) Oral every 6 hours PRN For Temp greater than 38 C (100.4 F)      LABS:                        10.1   5.8   )-----------( 226      ( 04 Dec 2017 06:41 )             31.9     12-04    138  |  105  |  4<L>  ----------------------------<  112<H>  3.9   |  27  |  0.78    Ca    8.2<L>      04 Dec 2017 06:41    TPro  6.6  /  Alb  2.7<L>  /  TBili  0.2  /  DBili  x   /  AST  27  /  ALT  44  /  AlkPhos  61  12-04      Urinalysis Basic - ( 02 Dec 2017 16:51 )    Color: Pale Yellow / Appearance: Clear / S.005 / pH: x  Gluc: x / Ketone: Negative  / Bili: Negative / Urobili: Negative   Blood: x / Protein: Negative / Nitrite: Negative   Leuk Esterase: Negative / RBC: 0-2 /HPF / WBC 0-2   Sq Epi: x / Non Sq Epi: Occasional / Bacteria: Occasional        CAPILLARY BLOOD GLUCOSE          CAPILLARY BLOOD GLUCOSE        CAPILLARY BLOOD GLUCOSE                RECENT CULTURES:      RADIOLOGY & ADDITIONAL TESTS:                        DVT/GI ppx  Discussed with pt @ bedside
Patient is a 42y old  Female who presents with a chief complaint of fever (02 Dec 2017 21:46)        HPI:  41 y/o F with PMH of pyelonephritis in 2017 presents to the ED with flank pain.  Symptoms started 2 days ago.  Pain is dull achy in nature, which started on the right flank but now radiates to the front of the abdomen.  Pt has only tried motrin today for pain relief.  Pt reports temp 102.8 at home. Pt said she felt nausea, but no vomiting.  Reports headache and mild dizziness (02 Dec 2017 21:46)      SUBJECTIVE & OBJECTIVE: Pt seen and examined at bedside.     PHYSICAL EXAM:  T(C): 37.3 (17 @ 07:05), Max: 38.9 (17 @ 17:24)  HR: 75 (17 @ 06:04) (75 - 100)  BP: 123/70 (17 @ 06:04) (85/52 - 123/70)  RR: 18 (17 @ 06:04) (16 - 18)  SpO2: 97% (17 @ 06:04) (96% - 100%)  Wt(kg): --   Weight (kg): 70.1 ( @ 23:49)  GENERAL: NAD, well-groomed, well-developed  HEAD:  Atraumatic, Normocephalic  EYES: EOMI, PERRLA, conjunctiva and sclera clear  ENMT: Moist mucous membranes  NECK: Supple, No JVD  NERVOUS SYSTEM:  Alert & Oriented X3, Motor Strength 5/5 B/L upper and lower extremities; DTRs 2+ intact and symmetric  CHEST/LUNG: Clear to auscultation bilaterally; No rales, rhonchi, wheezing, or rubs  HEART: Regular rate and rhythm; No murmurs, rubs, or gallops  ABDOMEN: Soft, Nontender, Nondistended; Bowel sounds present  EXTREMITIES:  2+ Peripheral Pulses, No clubbing, cyanosis, or edema        MEDICATIONS  (STANDING):  acetaminophen   Tablet. 650 milliGRAM(s) Oral once  enoxaparin Injectable 40 milliGRAM(s) SubCutaneous daily  influenza   Vaccine 0.5 milliLiter(s) IntraMuscular once  ondansetron Injectable 4 milliGRAM(s) IV Push once  piperacillin/tazobactam IVPB. 3.375 Gram(s) IV Intermittent every 8 hours  potassium chloride    Tablet ER 40 milliEquivalent(s) Oral every 4 hours    MEDICATIONS  (PRN):  acetaminophen   Tablet 650 milliGRAM(s) Oral every 6 hours PRN For Temp greater than 38 C (100.4 F)      LABS:                        10.1   9.6   )-----------( 178      ( 03 Dec 2017 08:20 )             31.7     12-    142  |  112<H>  |  5<L>  ----------------------------<  110<H>  3.3<L>   |  21<L>  |  0.66    Ca    7.3<L>      03 Dec 2017 08:20    TPro  7.6  /  Alb  3.3  /  TBili  0.4  /  DBili  x   /  AST  14<L>  /  ALT  17  /  AlkPhos  62  12-02      Urinalysis Basic - ( 02 Dec 2017 16:51 )    Color: Pale Yellow / Appearance: Clear / S.005 / pH: x  Gluc: x / Ketone: Negative  / Bili: Negative / Urobili: Negative   Blood: x / Protein: Negative / Nitrite: Negative   Leuk Esterase: Negative / RBC: 0-2 /HPF / WBC 0-2   Sq Epi: x / Non Sq Epi: Occasional / Bacteria: Occasional        CAPILLARY BLOOD GLUCOSE          CAPILLARY BLOOD GLUCOSE        CAPILLARY BLOOD GLUCOSE                RECENT CULTURES:      RADIOLOGY & ADDITIONAL TESTS:                        DVT/GI ppx  Discussed with pt @ bedside

## 2017-12-05 NOTE — DISCHARGE NOTE ADULT - CARE PLAN
Principal Discharge DX:	Sepsis  Goal:	secondary to acute pylo, resolving  Instructions for follow-up, activity and diet:	complete po ceftin for 5 more days  f/u urology in one week  Secondary Diagnosis:	Hypokalemia  Goal:	resolved  Secondary Diagnosis:	Headache  Goal:	likely secondary to 1, and fever, resolving  Secondary Diagnosis:	Acute pyelonephritis  Goal:	secondary to 1, resolving

## 2017-12-05 NOTE — DISCHARGE NOTE ADULT - HOSPITAL COURSE
43 y/o F with PMH of pyelonephritis in august 2017 presents to the ED with flank pain.  Symptoms started 2 days ago.  Pain is dull achy in nature, which started on the right flank but now radiates to the front of the abdomen.  Pt has only tried motrin today for pain relief, admitted for sepsis secondary to  Acute pyelonephritis  UA/UC negative,   bc negative  - Abd/pelvis CT positive for pyelonephritis,   urology eval appreicated, will follow as outpt  to complete ceftin for 5 more days   pt clinically back to baseline        Headache.  resolving            Hypokalemia. Plan: supplemeted K, now resolved     pt can proceed for d/c plannig 43 y/o F with PMH of pyelonephritis in august 2017 presents to the ED with flank pain.  Symptoms started 2 days ago.  Pain is dull achy in nature, which started on the right flank but now radiates to the front of the abdomen.  Pt has only tried motrin today for pain relief, admitted for sepsis secondary to  Acute pyelonephritis  UA/UC negative,   bc negative  - Abd/pelvis CT positive for pyelonephritis,   urology eval appreicated, will follow as outpt  to complete ceftin for 5 more days   pt clinically back to baseline   will need closely to follow up with urologist        Headache.  resolving   to f/u with neuro headdache specalizst               Hypokalemia. Plan: supplemeted K, now resolved     pt can proceed for d/c plannig

## 2017-12-05 NOTE — DISCHARGE NOTE ADULT - MEDICATION SUMMARY - MEDICATIONS TO STOP TAKING
I will STOP taking the medications listed below when I get home from the hospital:    amoxicillin-clavulanate 875 mg-125 mg oral tablet  -- 1 tab(s) by mouth 2 times a day

## 2017-12-05 NOTE — CONSULT NOTE ADULT - SUBJECTIVE AND OBJECTIVE BOX
CHIEF COMPLAINT: Pyelonephritis    HISTORY OF PRESENT ILLNESS:  43 y/o F with PMH of pyelonephritis in 2017 presents to the ED with flank pain.  Symptoms started 3 days ago.  Pain is dull achy in nature, which started on the right flank but now radiates to the front of the abdomen.  Pt has only tried Motrin today for pain relief.  Pt reports temp 102.8 at home. Pt said she felt nausea, but no vomiting.  Reports headache and mild dizziness (02 Dec    Now afebrile without dysuria, but c/o headache and mid back pain.  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  No incontinence, prior UTI 2017    Ur C&S negative but had elevated WBC, and positive CT findings for UTI      PAST MEDICAL & SURGICAL HISTORY:  Kidney infection  No significant past surgical history      REVIEW OF SYSTEMS:    CONSTITUTIONAL: No weakness, fevers or chills at present  EYES/ENT: No visual changes;  No vertigo or throat pain   NECK: No pain or stiffness  RESPIRATORY: No cough, wheezing, hemoptysis; No shortness of breath  CARDIOVASCULAR: No chest pain or palpitations  GASTROINTESTINAL: No abdominal or epigastric pain. No nausea, vomiting, or hematemesis; No diarrhea or constipation. No melena or hematochezia.  GENITOURINARY: No dysuria, frequency or hematuria, incontinence  NEUROLOGICAL: headache  SKIN: No itching, burning, rashes, or lesions       MEDICATIONS  (STANDING):  enoxaparin Injectable 40 milliGRAM(s) SubCutaneous daily  influenza   Vaccine 0.5 milliLiter(s) IntraMuscular once  piperacillin/tazobactam IVPB. 3.375 Gram(s) IV Intermittent every 8 hours    MEDICATIONS  (PRN):  acetaminophen   Tablet 650 milliGRAM(s) Oral every 6 hours PRN For Temp greater than 38 C (100.4 F)      Allergies    No Known Allergies    Intolerances        SOCIAL HISTORY:    FAMILY HISTORY:  Family history of diabetes mellitus in first degree relative (Sibling): Mother and Sister      Vital Signs Last 24 Hrs  T(C): 36.6 (05 Dec 2017 05:25), Max: 37.6 (04 Dec 2017 17:50)  T(F): 97.8 (05 Dec 2017 05:25), Max: 99.6 (04 Dec 2017 17:50)  HR: 74 (05 Dec 2017 05:25) (74 - 82)  BP: 97/66 (05 Dec 2017 05:25) (96/64 - 113/71)  BP(mean): --  RR: 16 (05 Dec 2017 05:25) (16 - 17)  SpO2: 100% (05 Dec 2017 05:25) (94% - 100%)    PHYSICAL EXAM:    Constitutional: NAD, well-developed  HEENT: NAN, EOMI, Normal Hearing, MMM  Neck: No LAD, No JVD  Back: Normal spine flexure, tenderness upper lumbar spine  Respiratory: CTAB   Cardiovascular: S1 and S2, RRR, no M/G/R  Abd: BS+, soft, NT/ND, No CVAT  Extremities: No peripheral edema  Vascular: 2+ peripheral pulses  Neurological: A/O x 3, no focal deficits  Psychiatric: Normal mood, normal affect      LABS:                        10.9   7.2   )-----------( 292      ( 05 Dec 2017 07:17 )             34.4     12    138  |  105  |  4<L>  ----------------------------<  112<H>  3.9   |  27  |  0.78    Ca    8.2<L>      04 Dec 2017 06:41    TPro  6.6  /  Alb  2.7<L>  /  TBili  0.2  /  DBili  x   /  AST  27  /  ALT  44  /  AlkPhos  61  12-04      Urinalysis Basic - ( 04 Dec 2017 16:09 )    Color: Yellow / Appearance: Slightly Turbid / S.010 / pH: x  Gluc: x / Ketone: Negative  / Bili: Negative / Urobili: Negative   Blood: x / Protein: Negative / Nitrite: Negative   Leuk Esterase: Trace / RBC: 11-25 /HPF / WBC 6-10   Sq Epi: x / Non Sq Epi: Occasional / Bacteria: Occasional      Urine Culture: Culture - Urine (17 @ 00:01)    Specimen Source: .Urine Clean Catch (Midstream)    Culture Results:   <10,000 CFU/ml  Normal Urogenital mena present      Hemoglobin: 10.9 g/dL ( @ 07:17)  Hematocrit: 34.4 % ( @ 07:17)  Hemoglobin: 10.1 g/dL ( @ 06:41)  Hematocrit: 31.9 % ( @ 06:41)      RADIOLOGY & ADDITIONAL STUDIES:    < from: CT Abdomen and Pelvis w/ IV Cont (17 @ 19:33) >      < end of copied text >  < from: US Renal (17 @ 08:11) >      < end of copied text >

## 2017-12-05 NOTE — DISCHARGE NOTE ADULT - CARE PROVIDER_API CALL
Umberto Garcia (MD), Urology  [address] [place] [address]  Phone: [phone]  Fax: [phone] Umberto Garcia (MD), Urology  [address]  [address]  [place] [address]  Phone: [phone]  Fax: [phone]    Bev Cisse), Neurology  [address] [place] [address]  Phone: [phone]  Fax: [phone]

## 2017-12-05 NOTE — PROVIDER CONTACT NOTE (OTHER) - ACTION/TREATMENT ORDERED:
Per Dr Dickerson one time dose Imitrex 25mg for headache telephone order is ok. Ok to give spouse and pt copies of lab results
Per Dr Fuller he will set up a Urology consult for pt. No other new orders at this time.
Per Dr Garland the blood in urine will not affect culture. It is ok to collect specimen
Give Tylenol 650mg PRN, Dr. Bobo will follow up with patient

## 2017-12-05 NOTE — DISCHARGE NOTE ADULT - PLAN OF CARE
secondary to acute pylo, resolving complete po ceftin for 5 more days  f/u urology in one week resolved likely secondary to 1, and fever, resolving secondary to 1, resolving

## 2017-12-05 NOTE — DISCHARGE NOTE ADULT - CARE PROVIDERS DIRECT ADDRESSES
[email or web address]

## 2017-12-05 NOTE — DISCHARGE NOTE ADULT - MEDICATION SUMMARY - MEDICATIONS TO TAKE
I will START or STAY ON the medications listed below when I get home from the hospital:    Ceftin 250 mg oral tablet  -- 2 tab(s) by mouth every 12 hours   -- Finish all this medication unless otherwise directed by prescriber.  Medication should be taken with plenty of water.  Take with food or milk.    -- Indication: For uti I will START or STAY ON the medications listed below when I get home from the hospital:    SUMAtriptan 25 mg oral tablet  -- 1 tab(s) by mouth once  -- Indication: For migrane     Ceftin 250 mg oral tablet  -- 2 tab(s) by mouth every 12 hours   -- Finish all this medication unless otherwise directed by prescriber.  Medication should be taken with plenty of water.  Take with food or milk.    -- Indication: For uti

## 2017-12-05 NOTE — DISCHARGE NOTE ADULT - PATIENT PORTAL LINK FT
“You can access the [facility] Patient Portal, offered by [facility], by registering with the following website: [email or web address]”

## 2018-03-13 ENCOUNTER — APPOINTMENT (OUTPATIENT)
Dept: UROLOGY | Facility: CLINIC | Age: 43
End: 2018-03-13
Payer: COMMERCIAL

## 2018-03-13 VITALS
HEIGHT: 61 IN | WEIGHT: 142 LBS | TEMPERATURE: 98.1 F | HEART RATE: 69 BPM | RESPIRATION RATE: 17 BRPM | SYSTOLIC BLOOD PRESSURE: 105 MMHG | BODY MASS INDEX: 26.81 KG/M2 | DIASTOLIC BLOOD PRESSURE: 68 MMHG

## 2018-03-13 PROBLEM — Z00.00 ENCOUNTER FOR PREVENTIVE HEALTH EXAMINATION: Status: ACTIVE | Noted: 2018-03-13

## 2018-03-13 PROBLEM — N15.9 RENAL TUBULO-INTERSTITIAL DISEASE, UNSPECIFIED: Chronic | Status: ACTIVE | Noted: 2017-12-02

## 2018-03-13 PROCEDURE — 99244 OFF/OP CNSLTJ NEW/EST MOD 40: CPT

## 2018-03-14 LAB
APPEARANCE: CLEAR
BACTERIA: NEGATIVE
BILIRUBIN URINE: NEGATIVE
BLOOD URINE: NEGATIVE
COLOR: YELLOW
GLUCOSE QUALITATIVE U: NEGATIVE MG/DL
KETONES URINE: NEGATIVE
LEUKOCYTE ESTERASE URINE: NEGATIVE
MICROSCOPIC-UA: NORMAL
NITRITE URINE: NEGATIVE
PH URINE: 6.5
PROTEIN URINE: NEGATIVE MG/DL
RED BLOOD CELLS URINE: 0 /HPF
SPECIFIC GRAVITY URINE: 1.01
SQUAMOUS EPITHELIAL CELLS: 0 /HPF
UROBILINOGEN URINE: NEGATIVE MG/DL
WHITE BLOOD CELLS URINE: 0 /HPF

## 2018-03-18 LAB
BACTERIA UR CULT: ABNORMAL
CORE LAB FLUID CYTOLOGY: NORMAL

## 2018-03-26 ENCOUNTER — APPOINTMENT (OUTPATIENT)
Dept: UROLOGY | Facility: CLINIC | Age: 43
End: 2018-03-26
Payer: COMMERCIAL

## 2018-03-26 ENCOUNTER — OUTPATIENT (OUTPATIENT)
Dept: OUTPATIENT SERVICES | Facility: HOSPITAL | Age: 43
LOS: 1 days | End: 2018-03-26
Payer: COMMERCIAL

## 2018-03-26 VITALS
DIASTOLIC BLOOD PRESSURE: 84 MMHG | HEART RATE: 74 BPM | RESPIRATION RATE: 17 BRPM | SYSTOLIC BLOOD PRESSURE: 127 MMHG | TEMPERATURE: 98.6 F

## 2018-03-26 DIAGNOSIS — R35.0 FREQUENCY OF MICTURITION: ICD-10-CM

## 2018-03-26 PROCEDURE — 52000 CYSTOURETHROSCOPY: CPT

## 2018-03-26 PROCEDURE — 99214 OFFICE O/P EST MOD 30 MIN: CPT | Mod: 25

## 2018-03-28 DIAGNOSIS — R39.89 OTHER SYMPTOMS AND SIGNS INVOLVING THE GENITOURINARY SYSTEM: ICD-10-CM

## 2018-03-28 DIAGNOSIS — N39.0 URINARY TRACT INFECTION, SITE NOT SPECIFIED: ICD-10-CM

## 2018-03-28 DIAGNOSIS — N12 TUBULO-INTERSTITIAL NEPHRITIS, NOT SPECIFIED AS ACUTE OR CHRONIC: ICD-10-CM

## 2018-04-03 ENCOUNTER — INPATIENT (INPATIENT)
Facility: HOSPITAL | Age: 43
LOS: 2 days | Discharge: HOME | DRG: 698 | End: 2018-04-06
Admitting: FAMILY MEDICINE
Payer: COMMERCIAL

## 2018-04-03 VITALS
HEART RATE: 93 BPM | DIASTOLIC BLOOD PRESSURE: 65 MMHG | OXYGEN SATURATION: 99 % | RESPIRATION RATE: 18 BRPM | SYSTOLIC BLOOD PRESSURE: 112 MMHG | WEIGHT: 141.98 LBS | TEMPERATURE: 98 F | HEIGHT: 61 IN

## 2018-04-03 DIAGNOSIS — Z29.9 ENCOUNTER FOR PROPHYLACTIC MEASURES, UNSPECIFIED: ICD-10-CM

## 2018-04-03 DIAGNOSIS — A41.9 SEPSIS, UNSPECIFIED ORGANISM: ICD-10-CM

## 2018-04-03 DIAGNOSIS — N12 TUBULO-INTERSTITIAL NEPHRITIS, NOT SPECIFIED AS ACUTE OR CHRONIC: ICD-10-CM

## 2018-04-03 DIAGNOSIS — E87.6 HYPOKALEMIA: ICD-10-CM

## 2018-04-03 DIAGNOSIS — R39.9 UNSPECIFIED SYMPTOMS AND SIGNS INVOLVING THE GENITOURINARY SYSTEM: Chronic | ICD-10-CM

## 2018-04-03 LAB
ALBUMIN SERPL ELPH-MCNC: 3.6 G/DL — SIGNIFICANT CHANGE UP (ref 3.3–5)
ALP SERPL-CCNC: 71 U/L — SIGNIFICANT CHANGE UP (ref 40–120)
ALT FLD-CCNC: 19 U/L — SIGNIFICANT CHANGE UP (ref 12–78)
ANION GAP SERPL CALC-SCNC: 10 MMOL/L — SIGNIFICANT CHANGE UP (ref 5–17)
APPEARANCE UR: ABNORMAL
AST SERPL-CCNC: 15 U/L — SIGNIFICANT CHANGE UP (ref 15–37)
BACTERIA # UR AUTO: ABNORMAL
BASOPHILS # BLD AUTO: 0.1 K/UL — SIGNIFICANT CHANGE UP (ref 0–0.2)
BASOPHILS NFR BLD AUTO: 0.4 % — SIGNIFICANT CHANGE UP (ref 0–2)
BILIRUB SERPL-MCNC: 0.4 MG/DL — SIGNIFICANT CHANGE UP (ref 0.2–1.2)
BILIRUB UR-MCNC: NEGATIVE — SIGNIFICANT CHANGE UP
BUN SERPL-MCNC: 7 MG/DL — SIGNIFICANT CHANGE UP (ref 7–23)
CALCIUM SERPL-MCNC: 8.4 MG/DL — LOW (ref 8.5–10.1)
CHLORIDE SERPL-SCNC: 105 MMOL/L — SIGNIFICANT CHANGE UP (ref 96–108)
CO2 SERPL-SCNC: 24 MMOL/L — SIGNIFICANT CHANGE UP (ref 22–31)
COLOR SPEC: YELLOW — SIGNIFICANT CHANGE UP
CREAT SERPL-MCNC: 0.81 MG/DL — SIGNIFICANT CHANGE UP (ref 0.5–1.3)
DIFF PNL FLD: ABNORMAL
EOSINOPHIL # BLD AUTO: 0 K/UL — SIGNIFICANT CHANGE UP (ref 0–0.5)
EOSINOPHIL NFR BLD AUTO: 0.2 % — SIGNIFICANT CHANGE UP (ref 0–6)
EPI CELLS # UR: SIGNIFICANT CHANGE UP
ERYTHROCYTE [SEDIMENTATION RATE] IN BLOOD: 64 MM/HR — HIGH (ref 0–15)
GLUCOSE SERPL-MCNC: 124 MG/DL — HIGH (ref 70–99)
GLUCOSE UR QL: 100 MG/DL
HBA1C BLD-MCNC: 5.4 % — SIGNIFICANT CHANGE UP (ref 4–5.6)
HCG SERPL-ACNC: <1 MIU/ML — SIGNIFICANT CHANGE UP
HCT VFR BLD CALC: 35.8 % — SIGNIFICANT CHANGE UP (ref 34.5–45)
HGB BLD-MCNC: 12 G/DL — SIGNIFICANT CHANGE UP (ref 11.5–15.5)
HIV 1 & 2 AB SERPL IA.RAPID: SIGNIFICANT CHANGE UP
KETONES UR-MCNC: ABNORMAL
LACTATE SERPL-SCNC: 0.8 MMOL/L — SIGNIFICANT CHANGE UP (ref 0.7–2)
LACTATE SERPL-SCNC: 2.7 MMOL/L — HIGH (ref 0.7–2)
LEUKOCYTE ESTERASE UR-ACNC: ABNORMAL
LYMPHOCYTES # BLD AUTO: 14.2 % — SIGNIFICANT CHANGE UP (ref 13–44)
LYMPHOCYTES # BLD AUTO: 2.1 K/UL — SIGNIFICANT CHANGE UP (ref 1–3.3)
MCHC RBC-ENTMCNC: 28.6 PG — SIGNIFICANT CHANGE UP (ref 27–34)
MCHC RBC-ENTMCNC: 33.3 GM/DL — SIGNIFICANT CHANGE UP (ref 32–36)
MCV RBC AUTO: 85.7 FL — SIGNIFICANT CHANGE UP (ref 80–100)
MONOCYTES # BLD AUTO: 1.4 K/UL — HIGH (ref 0–0.9)
MONOCYTES NFR BLD AUTO: 9.4 % — HIGH (ref 1–9)
NEUTROPHILS # BLD AUTO: 11 K/UL — HIGH (ref 1.8–7.4)
NEUTROPHILS NFR BLD AUTO: 75.8 % — SIGNIFICANT CHANGE UP (ref 43–77)
NITRITE UR-MCNC: NEGATIVE — SIGNIFICANT CHANGE UP
PH UR: 6.5 — SIGNIFICANT CHANGE UP (ref 5–8)
PLATELET # BLD AUTO: 269 K/UL — SIGNIFICANT CHANGE UP (ref 150–400)
POTASSIUM SERPL-MCNC: 3.2 MMOL/L — LOW (ref 3.5–5.3)
POTASSIUM SERPL-SCNC: 3.2 MMOL/L — LOW (ref 3.5–5.3)
PROCALCITONIN SERPL-MCNC: <0.05 — SIGNIFICANT CHANGE UP (ref 0–0.04)
PROT SERPL-MCNC: 8.2 G/DL — SIGNIFICANT CHANGE UP (ref 6–8.3)
PROT UR-MCNC: NEGATIVE — SIGNIFICANT CHANGE UP
RBC # BLD: 4.18 M/UL — SIGNIFICANT CHANGE UP (ref 3.8–5.2)
RBC # FLD: 11.9 % — SIGNIFICANT CHANGE UP (ref 10.3–14.5)
RBC CASTS # UR COMP ASSIST: ABNORMAL /HPF (ref 0–4)
SODIUM SERPL-SCNC: 139 MMOL/L — SIGNIFICANT CHANGE UP (ref 135–145)
SP GR SPEC: 1.01 — SIGNIFICANT CHANGE UP (ref 1.01–1.02)
UROBILINOGEN FLD QL: NEGATIVE — SIGNIFICANT CHANGE UP
WBC # BLD: 14.5 K/UL — HIGH (ref 3.8–10.5)
WBC # FLD AUTO: 14.5 K/UL — HIGH (ref 3.8–10.5)
WBC UR QL: ABNORMAL

## 2018-04-03 PROCEDURE — 99285 EMERGENCY DEPT VISIT HI MDM: CPT

## 2018-04-03 PROCEDURE — 99223 1ST HOSP IP/OBS HIGH 75: CPT | Mod: AI

## 2018-04-03 PROCEDURE — 74176 CT ABD & PELVIS W/O CONTRAST: CPT | Mod: 26

## 2018-04-03 RX ORDER — CEFTRIAXONE 500 MG/1
1 INJECTION, POWDER, FOR SOLUTION INTRAMUSCULAR; INTRAVENOUS ONCE
Qty: 0 | Refills: 0 | Status: COMPLETED | OUTPATIENT
Start: 2018-04-03 | End: 2018-04-03

## 2018-04-03 RX ORDER — MEROPENEM 1 G/30ML
1000 INJECTION INTRAVENOUS EVERY 8 HOURS
Qty: 0 | Refills: 0 | Status: COMPLETED | OUTPATIENT
Start: 2018-04-03 | End: 2018-04-04

## 2018-04-03 RX ORDER — SODIUM CHLORIDE 9 MG/ML
1000 INJECTION INTRAMUSCULAR; INTRAVENOUS; SUBCUTANEOUS ONCE
Qty: 0 | Refills: 0 | Status: COMPLETED | OUTPATIENT
Start: 2018-04-03 | End: 2018-04-03

## 2018-04-03 RX ORDER — PHENAZOPYRIDINE HCL 100 MG
100 TABLET ORAL EVERY 8 HOURS
Qty: 0 | Refills: 0 | Status: COMPLETED | OUTPATIENT
Start: 2018-04-03 | End: 2018-04-04

## 2018-04-03 RX ORDER — MEROPENEM 1 G/30ML
INJECTION INTRAVENOUS
Qty: 0 | Refills: 0 | Status: COMPLETED | OUTPATIENT
Start: 2018-04-03 | End: 2018-04-04

## 2018-04-03 RX ORDER — MEROPENEM 1 G/30ML
1000 INJECTION INTRAVENOUS ONCE
Qty: 0 | Refills: 0 | Status: COMPLETED | OUTPATIENT
Start: 2018-04-03 | End: 2018-04-03

## 2018-04-03 RX ORDER — POTASSIUM CHLORIDE 20 MEQ
20 PACKET (EA) ORAL ONCE
Qty: 0 | Refills: 0 | Status: COMPLETED | OUTPATIENT
Start: 2018-04-03 | End: 2018-04-03

## 2018-04-03 RX ORDER — ONDANSETRON 8 MG/1
4 TABLET, FILM COATED ORAL ONCE
Qty: 0 | Refills: 0 | Status: COMPLETED | OUTPATIENT
Start: 2018-04-03 | End: 2018-04-03

## 2018-04-03 RX ORDER — POTASSIUM CHLORIDE 20 MEQ
40 PACKET (EA) ORAL EVERY 4 HOURS
Qty: 0 | Refills: 0 | Status: COMPLETED | OUTPATIENT
Start: 2018-04-03 | End: 2018-04-04

## 2018-04-03 RX ORDER — FERROUS SULFATE 325(65) MG
325 TABLET ORAL DAILY
Qty: 0 | Refills: 0 | Status: DISCONTINUED | OUTPATIENT
Start: 2018-04-03 | End: 2018-04-06

## 2018-04-03 RX ORDER — PREGABALIN 225 MG/1
1000 CAPSULE ORAL DAILY
Qty: 0 | Refills: 0 | Status: DISCONTINUED | OUTPATIENT
Start: 2018-04-03 | End: 2018-04-06

## 2018-04-03 RX ADMIN — CEFTRIAXONE 100 GRAM(S): 500 INJECTION, POWDER, FOR SOLUTION INTRAMUSCULAR; INTRAVENOUS at 10:54

## 2018-04-03 RX ADMIN — SODIUM CHLORIDE 1000 MILLILITER(S): 9 INJECTION INTRAMUSCULAR; INTRAVENOUS; SUBCUTANEOUS at 09:44

## 2018-04-03 RX ADMIN — MEROPENEM 100 MILLIGRAM(S): 1 INJECTION INTRAVENOUS at 21:13

## 2018-04-03 RX ADMIN — SODIUM CHLORIDE 1000 MILLILITER(S): 9 INJECTION INTRAMUSCULAR; INTRAVENOUS; SUBCUTANEOUS at 10:53

## 2018-04-03 RX ADMIN — ONDANSETRON 4 MILLIGRAM(S): 8 TABLET, FILM COATED ORAL at 09:44

## 2018-04-03 RX ADMIN — Medication 40 MILLIEQUIVALENT(S): at 21:03

## 2018-04-03 RX ADMIN — MEROPENEM 100 MILLIGRAM(S): 1 INJECTION INTRAVENOUS at 18:18

## 2018-04-03 RX ADMIN — Medication 100 MILLIGRAM(S): at 21:03

## 2018-04-03 RX ADMIN — Medication 20 MILLIEQUIVALENT(S): at 10:58

## 2018-04-03 NOTE — MEDICAL STUDENT ADULT H&P (EDUCATION) - NS MD HP STUD ASPLAN PLAN FT
1) Acute Pyelonephritis  - appreciate ID recs- Dr. Fan  - start IV meropenem 1g q8hrs  - check QuantiFeron, ESR, CRP   - consider obtaining pathology results from the cystoscopy done recently  - Bcx and Ucx pending  - Monitor I's & O's  - continue IVF  - Pain control  - Control fevers    2) VTE Prophylaxis  - SCD's

## 2018-04-03 NOTE — MEDICAL STUDENT ADULT H&P (EDUCATION) - NS MD HP STUD HX OF PRESENT ILLNESS FT
44 y/o Female with PMHx of pyelonephritis in 8/2017 and 12/2017 presents to the ED with b/l flank pain, fever and nausea. Patient reports a Temp of 103 F 2 days ago and has been taking Tylenol prn for fever. Patient reports pain started yesterday at both flanks and has mild periumbilical pain. Patient has been on Abx chronically since her first admission in August, however she stopped around 5 days ago. Patient had a cystoscopy on 3/26/18 which showed inflammation. She is being seen by urology, Dr. Chato Judd, as an outpatient. Patient reports at  baseline mild dysuria and mild frequency but these symptoms worsened progressively after she stopped the Abx    ED Course: VS showed T- 98.4, HR- 93, BP- 112/65, RR-18, Sp02- 99 on RA. CBC showed WBC-14.5. K-3.2, Glucose- 127. Ca- 8.4, Lactate- 2.7. U/A was positive. Rapid HIV was negative. CT abd showed findings 2/2 UTI/pyelonephritis, no obstrucutive uropathy. Patient received Zofran 4mg, KCl 20 mEq tablet, 2L NS bolus, Ceftriaxone 1g IV. Bcx, Ucx, Urine hcg drawn.

## 2018-04-03 NOTE — H&P ADULT - PROBLEM SELECTOR PLAN 2
-acute, recurrent, likely sec to recent instrumentation from cystoscopy  -cont antibx selection as per ID, meropenem

## 2018-04-03 NOTE — ED PROVIDER NOTE - MEDICAL DECISION MAKING DETAILS
pt c/o fever, dysuria, freq, left flank pain, nausea since yest c/w prior pyelo - labs/ct/ivf/zofran

## 2018-04-03 NOTE — H&P ADULT - HISTORY OF PRESENT ILLNESS
44 y/o Female with PMHx of pyelonephritis in 8/2017 and 12/2017 presents to the ED with b/l flank pain, fever and nausea. Patient reports a Temp of 103 F 2 days ago and has been taking Tylenol prn for fever. Patient reports pain started yesterday at both flanks and has mild periumbilical pain. Patient has been on Abx chronically since her first admission in August, however she stopped around 5 days ago. Patient had a cystoscopy on 3/26/18 which showed inflammation. She is being seen by urology, Dr. Chato Judd, as an outpatient. Patient reports at  baseline mild dysuria and mild frequency but these symptoms worsened progressively after she stopped the Abx. Her and  state that everytime she comes off the antibx she recurs her symptoms.     In the ED pt received: cftx iv, bolus ns, was found to have elevated LA of 2.7, repeat normal 0.8; CT abd showed findings 2/2 UTI/pyelonephritis, no obstrucutive uropathy.

## 2018-04-03 NOTE — CONSULT NOTE ADULT - SUBJECTIVE AND OBJECTIVE BOX
Infectious Diseases Consult by Ana Maria Fan MD    Reason for Consult : possible sepsis secondary to complicated  infection    HPI:  42 y/o Female with PMHx of pyelonephritis in 2017 and 2017 presents to the ED with b/l flank pain, fever and nausea. Patient reports a Temp of 103 F 2 days ago and has been taking Tylenol prn for fever. Patient reports pain started yesterday at both flanks and has mild periumbilical pain. Patient has been on Abx chronically since her first admission in August, however she stopped around 5 days ago. Patient had a cystoscopy on 3/26/18 which showed inflammation. She is being seen by urology, Dr. Chato Judd, as an outpatient. She is scheduled for bladder instillation next week Patient reports at  baseline mild dysuria and mild frequency but these symptoms worsened progressively after she stopped the Abx . She complaints of bilateral flank and lower abdominal pain L>R . She also has frequency of urine associated with dysuria and itching . She has been seen by Gyn also     ED Course: VS showed T- 98.4, HR- 93, BP- 112/65, RR-18, Sp02- 99 on RA. CBC showed WBC-14.5. K-3.2, Glucose- 127. Ca- 8.4, Lactate- 2.7. U/A was positive. Rapid HIV was negative. CT abd showed findings 2/2 UTI/pyelonephritis, no obstructive uropathy. Patient received Zofran 4mg, KCl 20 mEq tablet, 2L NS bolus, Ceftriaxone 1g IV. Bcx,  Ucx, Urine hcg drawn.    On review of her records from all admission and er visits , all urine and blood cs have been negative except in August she has staph saprophyticus .       Past Medical & Surgical Hx:  PAST MEDICAL & SURGICAL HISTORY:  UTI (urinary tract infection)  Kidney infection  No significant past surgical history  C  section x 3       Social History--  , house wife   EtOH: denies   Tobacco: denies  Drug Use: denies       FAMILY HISTORY:  Family history of diabetes mellitus in first degree relative (Sibling): Mother and Sister      Allergies    No Known Allergies    Intolerances    Home/ Out patient  Medications :  Home Medications:  cyanocobalamin 1000 mcg oral tablet: 1 tab(s) orally once a day (2018 15:41)  ferrous sulfate 325 mg (65 mg elemental iron) oral tablet: 1 tab(s) orally once a day (2018 15:41)  Multiple Vitamins oral tablet: 1 tab(s) orally once a day (2018 15:41)      Current Inpatient Medications :    ANTIBIOTICS:       OTHER RELEVANT MEDICATIONS :    ROS:  CONSTITUTIONAL:  Positive for  fever and  chills, feels well, good appetite  EYES:  Negative  blurry vision or double vision  CARDIOVASCULAR:  Negative for chest pain or palpitations  RESPIRATORY:  Negative for cough, wheezing, or SOB   GASTROINTESTINAL:  Negative for nausea, vomiting, diarrhea, constipation, or abdominal pain  GENITOURINARY:  Positive  frequency, urgency , dysuria and itching   NEUROLOGIC:  No headache, confusion, dizziness, lightheadedness  All other systems were reviewed and are negative          I&O's Detail      Physical Exam:  Vital Signs Last 24 Hrs  T(C): 37.7 (2018 16:29), Max: 37.7 (2018 14:24)  T(F): 99.9 (2018 16:29), Max: 99.9 (2018 14:24)  HR: 88 (2018 16:29) (87 - 93)  BP: 114/72 (2018 16:29) (110/74 - 114/72)  BP(mean): --  RR: 16 (2018 16:29) (16 - 18)  SpO2: 99% (2018 16:29) (99% - 99%)  Height (cm): 154.94 ( @ 09:03)  Weight (kg): 64.4 ( @ 09:03)  BMI (kg/m2): 26.8 ( @ 09:03)  BSA (m2): 1.63 ( @ 09:03)    General: well developed well nourished, in no acute distress  Eyes: sclera anicteric, pupils equal and reactive to light  ENMT: buccal mucosa moist, pharynx not injected  Neck: supple, trachea midline  Lungs: clear, no wheeze/rhonchi  Cardiovascular: regular rate and rhythm, S1 S2  Abdomen: soft, nontender, no organomegaly present, bowel sounds normal, left CVA tenderness, supra pubic discomfort   Neurological:  alert and oriented x3, Cranial Nerves II-XII grossly intact  Skin:no increased ecchymosis/petechiae/purpura  Lymph Nodes: no palpable cervical/supraclavicular lymph nodes enlargements  Extremities: no cyanosis/clubbing/edema    Labs:                      12.0   14.5   )----------(  269       ( 2018 09:46 )               35.8      139    |  105    |  7      ----------------------------<  124        ( 2018 09:46 )  3.2     |  24     |  0.81     Ca    8.4        ( 2018 09:46 )    TPro  8.2    /  Alb  3.6    /  TBili  0.4    /  DBili  x      /  AST  15     /  ALT  19     /  AlkPhos  71     ( 2018 09:46 )    LIVER FUNCTIONS - ( 2018 09:46 )  Alb: 3.6 g/dL / Pro: 8.2 g/dL / ALK PHOS: 71 U/L / ALT: 19 U/L / AST: 15 U/L / GGT: x           Urinalysis Basic - ( 2018 09:46 )    Color: Yellow / Appearance: Slightly Turbid / S.015 / pH: x  Gluc: x / Ketone: Trace  / Bili: Negative / Urobili: Negative   Blood: x / Protein: Negative / Nitrite: Negative   Leuk Esterase: Moderate / RBC: 11-25 /HPF / WBC 26-50   Sq Epi: x / Non Sq Epi: Few / Bacteria: Moderate    Lactate, Blood: 0.8 mmol/L (18 @ 13:44)  Lactate, Blood: 2.7 mmol/L (18 @ 09:46)           RECENT CULTURES:      RADIOLOGY & ADDITIONAL STUDIES:  CT Renal Stone Craven (18 @ 11:48) >  FINDINGS: Evaluation of the solid organ parenchyma is limited by the lack   of intravenous contrast.      The liver, spleen and gallbladder appear unremarkable.    The adrenal glands and nonenhanced pancreas are unremarkable in   appearance.    There is mild bilateral perinephric stranding.No perinephric fluid   collection is noted.  No hydronephrosis is noted.  There is mild prominence of the ureters bilaterally. The urinary bladder   is distended.  No obstructing ureteral calculus is noted.    The abdominal aorta is normal in caliber.  No enlarged retroperitoneal lymphadenopathy is noted.    Evaluation of the stomach and gastrointestinal tract is limited without   oral contrast distention.  Rounded radiopaque densities within the duodenum, may be related to   ingested pills.  No bowel obstruction is noted.  There is a normal-appearing appendix.    There is a small fat-containing periumbilical hernia.    No pelvic mass or free fluid is noted.      Impression:    CT findings as discussed which may be secondary to urinary tract   infection/pyelonephritis. Cannot exclude recently passed ureteral stone.  No CT evidence for obstructive uropathy.    Other findings as discussed.      Assessment :     42 year old F with hx pyelonephritis in , and 2017 readmitted with bilateral  flank pain with fever and nausea and dysuria , had recent cystoscopy 1 week ago by Dr. Judd at [facility] . She just finished a course of Bactrim   Recurrent pyelonephritis? Infected renal cyst? UA is positive and all prior urine cs have been negative , except one in september  grew Staph Saprophyticus and was treated with Augmentin.    She has no signs of obstruction or nephrolithiasis Need to  concerned about Acute interstitial cystis / nephritis and other causes  of recurrent pyelonephritis . As she is from [facility] need to rule out renal TB     Plan :   - as she had recent instrumentation and has 103 fever will give broad spectrum antibiotics to cover ESBL or other MDRO GNR pathogens, seen commonly following urology procedures . Will place on Meropene i gram q 8 hours pending cs  - consider obtaining pathology results fro the cystoscopy done recently   - check QuantiFeron sed rate, CRP .  - consider MRI of kidney and bladder with contrast after discussion with radiologist   - follow cs and continue with IV fluid     Continue with present regime .  Appropriate use of antibiotics and adverse effects reviewed.      I have discussed the above plan of care with patient/ and her  in detail. They expressed understanding of the treatment plan . Risks, benefits and alternatives discussed in detail. I have asked if they have any questions or concerns and appropriately addressed them to the best of my ability .      > 75 minutes spent in direct patient care reviewing  the notes, lab data/ imaging , discussion with multidisciplinary team. All questions were addressed and answered to the best of my capacity .    Thank you for allowing me to participate in the care of your patient .      Ana Maria Fan MD  [phone]

## 2018-04-03 NOTE — ED PROVIDER NOTE - OBJECTIVE STATEMENT
pt with hx mult episodes uti and pyelo c/o fever (tmax 103), left flank pain, dysuria, freq, nausea since last night. no ha, cp, sob, cough, diarrhea.  pmd - nihami  uro - aviles

## 2018-04-03 NOTE — MEDICAL STUDENT ADULT H&P (EDUCATION) - NS MD HP STUD ROS GI FT
admits mild periumbilical pain. denies nausea, vomiting, diarrhea, constipation, melena, hematochezia

## 2018-04-03 NOTE — MEDICAL STUDENT ADULT H&P (EDUCATION) - NS MD HP STUD ASPLAN ASSES FT
42 y/o F w/ PMHx of Pyelonephritis (8/2017, 12/2017) presents to the ED w/ b/l flank pain, fever and nausea. 44 y/o F w/ PMHx of Pyelonephritis (8/2017, 12/2017) presents to the ED w/ b/l flank pain, fever, chills and nausea. Admitted for acute pyelonephritis

## 2018-04-03 NOTE — CHART NOTE - NSCHARTNOTEFT_GEN_A_CORE
Patient is a 43y old  Female who presents with a chief complaint of dysuria and frequency. Patient with recent history of pyelonephritis, follows up with Uro- Dr. Judd, had cystoscopy 2 weeks ago and recently completed course of abx 5 days ago. Patient states since yesterday her symptoms of dysuria and frequency have returned. Also complaining of fatigue and fevers/chills. Denies CP, SOB, palpitations.    Vital Signs Last 24 Hrs  T(C): 36.9 (03 Apr 2018 10:20), Max: 36.9 (03 Apr 2018 10:20)  T(F): 98.4 (03 Apr 2018 10:20), Max: 98.4 (03 Apr 2018 10:20)  HR: 93 (03 Apr 2018 09:03) (93 - 93)  BP: 112/65 (03 Apr 2018 09:03) (112/65 - 112/65)  BP(mean): --  RR: 18 (03 Apr 2018 09:03) (18 - 18)  SpO2: 99% (03 Apr 2018 09:03) (99% - 99%)                          12.0   14.5  )-----------( 269      ( 03 Apr 2018 09:46 )             35.8       Lactate, Blood: 2.7 mmol/L (04-03 @ 09:46)          MEDICATIONS  (STANDING):    MEDICATIONS  (PRN):      Antibiotics Given [ x]    Physical Exam:  General: NAD  HEENT: NCAT, MMM, EOMI  Cardio: +S1S2 No M/R/G, No JVD  Pulm: CTA B/L no W/R/R  Skin: Warm, Dry, Capillary refill <2 seconds, good skin turgor  Abd: Soft, + tender to palpation B/L lower quadrants- no guarding, no rebound  Ext: no c/c/e, 2+ pulses throughout    A/P: 43y  Female p/w dysuria and frequency- likely pyelonephritis  [x ] Blood cultures drawn  [x ] Initial Lactate drawn  [x ] Repeat Lactate ordered  [x ] Antibiotics given  [x ] Fluid resuscitation, 30cc/kg. Total amount of fluid given- 2L  [x ] Attending Notification

## 2018-04-03 NOTE — H&P ADULT - FAMILY HISTORY
Sibling  Still living? Yes, Estimated age: Age Unknown  Family history of diabetes mellitus in first degree relative, Age at diagnosis: Age Unknown

## 2018-04-03 NOTE — MEDICAL STUDENT ADULT H&P (EDUCATION) - NS MD HP STUD PE GU FT
Mild CVA tenderness, b/l flank pain on palpation Mild b/l CVA tenderness, b/l flank pain on palpation

## 2018-04-03 NOTE — H&P ADULT - ASSESSMENT
42 y/o Female with PMHx of pyelonephritis in 8/2017 and 12/2017 presents to the ED with b/l flank pain, fever and nausea a/w sepsis sec to acute, recurrent pyelonephritis.

## 2018-04-03 NOTE — H&P ADULT - PROBLEM SELECTOR PLAN 1
-sec to pyleonephritis, pt had leukocytosis and elevated lactic acid on admission  -pt revceived ivf and antibx  -apprec ID recs Dr. Fan

## 2018-04-04 LAB
ALBUMIN SERPL ELPH-MCNC: 3 G/DL — LOW (ref 3.3–5)
ALP SERPL-CCNC: 64 U/L — SIGNIFICANT CHANGE UP (ref 40–120)
ALT FLD-CCNC: 18 U/L — SIGNIFICANT CHANGE UP (ref 12–78)
ANION GAP SERPL CALC-SCNC: 6 MMOL/L — SIGNIFICANT CHANGE UP (ref 5–17)
AST SERPL-CCNC: 16 U/L — SIGNIFICANT CHANGE UP (ref 15–37)
BASOPHILS # BLD AUTO: 0.1 K/UL — SIGNIFICANT CHANGE UP (ref 0–0.2)
BASOPHILS NFR BLD AUTO: 0.7 % — SIGNIFICANT CHANGE UP (ref 0–2)
BILIRUB SERPL-MCNC: 0.4 MG/DL — SIGNIFICANT CHANGE UP (ref 0.2–1.2)
BUN SERPL-MCNC: 5 MG/DL — LOW (ref 7–23)
CALCIUM SERPL-MCNC: 8.5 MG/DL — SIGNIFICANT CHANGE UP (ref 8.5–10.1)
CHLORIDE SERPL-SCNC: 107 MMOL/L — SIGNIFICANT CHANGE UP (ref 96–108)
CO2 SERPL-SCNC: 27 MMOL/L — SIGNIFICANT CHANGE UP (ref 22–31)
CREAT SERPL-MCNC: 0.74 MG/DL — SIGNIFICANT CHANGE UP (ref 0.5–1.3)
CRP SERPL-MCNC: 6.8 MG/DL — HIGH (ref 0–0.4)
EOSINOPHIL # BLD AUTO: 0.1 K/UL — SIGNIFICANT CHANGE UP (ref 0–0.5)
EOSINOPHIL NFR BLD AUTO: 0.7 % — SIGNIFICANT CHANGE UP (ref 0–6)
GLUCOSE SERPL-MCNC: 85 MG/DL — SIGNIFICANT CHANGE UP (ref 70–99)
HCT VFR BLD CALC: 34 % — LOW (ref 34.5–45)
HGB BLD-MCNC: 11.1 G/DL — LOW (ref 11.5–15.5)
LYMPHOCYTES # BLD AUTO: 2.3 K/UL — SIGNIFICANT CHANGE UP (ref 1–3.3)
LYMPHOCYTES # BLD AUTO: 22.5 % — SIGNIFICANT CHANGE UP (ref 13–44)
MAGNESIUM SERPL-MCNC: 2.3 MG/DL — SIGNIFICANT CHANGE UP (ref 1.6–2.6)
MCHC RBC-ENTMCNC: 28.3 PG — SIGNIFICANT CHANGE UP (ref 27–34)
MCHC RBC-ENTMCNC: 32.6 GM/DL — SIGNIFICANT CHANGE UP (ref 32–36)
MCV RBC AUTO: 86.7 FL — SIGNIFICANT CHANGE UP (ref 80–100)
MONOCYTES # BLD AUTO: 1.2 K/UL — HIGH (ref 0–0.9)
MONOCYTES NFR BLD AUTO: 11.9 % — HIGH (ref 1–9)
NEUTROPHILS # BLD AUTO: 6.7 K/UL — SIGNIFICANT CHANGE UP (ref 1.8–7.4)
NEUTROPHILS NFR BLD AUTO: 64.2 % — SIGNIFICANT CHANGE UP (ref 43–77)
NIGHT BLUE STAIN TISS: SIGNIFICANT CHANGE UP
PHOSPHATE SERPL-MCNC: 2.7 MG/DL — SIGNIFICANT CHANGE UP (ref 2.5–4.5)
PLATELET # BLD AUTO: 291 K/UL — SIGNIFICANT CHANGE UP (ref 150–400)
POTASSIUM SERPL-MCNC: 4.3 MMOL/L — SIGNIFICANT CHANGE UP (ref 3.5–5.3)
POTASSIUM SERPL-SCNC: 4.3 MMOL/L — SIGNIFICANT CHANGE UP (ref 3.5–5.3)
PROT SERPL-MCNC: 7.3 G/DL — SIGNIFICANT CHANGE UP (ref 6–8.3)
RBC # BLD: 3.93 M/UL — SIGNIFICANT CHANGE UP (ref 3.8–5.2)
RBC # FLD: 12.2 % — SIGNIFICANT CHANGE UP (ref 10.3–14.5)
SODIUM SERPL-SCNC: 140 MMOL/L — SIGNIFICANT CHANGE UP (ref 135–145)
SPECIMEN SOURCE: SIGNIFICANT CHANGE UP
WBC # BLD: 10.4 K/UL — SIGNIFICANT CHANGE UP (ref 3.8–10.5)
WBC # FLD AUTO: 10.4 K/UL — SIGNIFICANT CHANGE UP (ref 3.8–10.5)

## 2018-04-04 PROCEDURE — 99233 SBSQ HOSP IP/OBS HIGH 50: CPT

## 2018-04-04 RX ORDER — ERTAPENEM SODIUM 1 G/1
1000 INJECTION, POWDER, LYOPHILIZED, FOR SOLUTION INTRAMUSCULAR; INTRAVENOUS EVERY 24 HOURS
Qty: 0 | Refills: 0 | Status: COMPLETED | OUTPATIENT
Start: 2018-04-05 | End: 2018-04-05

## 2018-04-04 RX ORDER — ACETAMINOPHEN 500 MG
650 TABLET ORAL EVERY 6 HOURS
Qty: 0 | Refills: 0 | Status: DISCONTINUED | OUTPATIENT
Start: 2018-04-04 | End: 2018-04-06

## 2018-04-04 RX ORDER — LACTOBACILLUS ACIDOPHILUS 100MM CELL
1 CAPSULE ORAL
Qty: 0 | Refills: 0 | Status: DISCONTINUED | OUTPATIENT
Start: 2018-04-04 | End: 2018-04-06

## 2018-04-04 RX ADMIN — Medication 1 TABLET(S): at 17:28

## 2018-04-04 RX ADMIN — MEROPENEM 100 MILLIGRAM(S): 1 INJECTION INTRAVENOUS at 05:35

## 2018-04-04 RX ADMIN — Medication 100 MILLIGRAM(S): at 13:30

## 2018-04-04 RX ADMIN — Medication 100 MILLIGRAM(S): at 05:35

## 2018-04-04 RX ADMIN — Medication 650 MILLIGRAM(S): at 22:11

## 2018-04-04 RX ADMIN — Medication 650 MILLIGRAM(S): at 11:35

## 2018-04-04 RX ADMIN — Medication 650 MILLIGRAM(S): at 12:15

## 2018-04-04 RX ADMIN — MEROPENEM 100 MILLIGRAM(S): 1 INJECTION INTRAVENOUS at 13:29

## 2018-04-04 RX ADMIN — Medication 40 MILLIEQUIVALENT(S): at 02:06

## 2018-04-04 RX ADMIN — PREGABALIN 1000 MICROGRAM(S): 225 CAPSULE ORAL at 11:48

## 2018-04-04 RX ADMIN — MEROPENEM 100 MILLIGRAM(S): 1 INJECTION INTRAVENOUS at 22:11

## 2018-04-04 RX ADMIN — Medication 1 TABLET(S): at 11:48

## 2018-04-04 RX ADMIN — Medication 325 MILLIGRAM(S): at 11:48

## 2018-04-04 NOTE — PROGRESS NOTE ADULT - SUBJECTIVE AND OBJECTIVE BOX
LEYDA COLEMAN is a 43yFemale , patient examined and chart reviewed. Patient seen and examined today being followed for Recurrent pyelonephritis       INTERVAL HPI/ OVERNIGHT EVENTS: Events noted, feels better , still with dysuria and frequency Flank pain is better     PAST MEDICAL & SURGICAL HISTORY:  UTI (urinary tract infection)  Kidney infection  Abnormal cystoscopy: performed by Dr. Judd urology [facility]      For details regarding the patient's social history, family history, and other miscellaneous elements, please refer the initial infectious diseases consultation and/or the admitting history and physical examination for this admission.    ROS:  CONSTITUTIONAL:  pos for  fever and chills, feels well, good appetite  EYES:  Negative  blurry vision or double vision  CARDIOVASCULAR:  Negative for chest pain or palpitations  RESPIRATORY:  Negative for cough, wheezing, or SOB   GASTROINTESTINAL:  Negative for nausea, vomiting, diarrhea, constipation, or abdominal pain  GENITOURINARY:  pos for frequency, urgency or dysuria  NEUROLOGIC:  No headache, confusion, dizziness, lightheadedness  All other systems were reviewed and are negative     Allergies:      Current inpatient medications :    ANTIBIOTICS/RELEVANT:  meropenem  IVPB      meropenem  IVPB 1000 milliGRAM(s) IV Intermittent every 8 hours      acetaminophen   Tablet. 650 milliGRAM(s) Oral every 6 hours PRN  cyanocobalamin 1000 MICROGram(s) Oral daily  ferrous    sulfate 325 milliGRAM(s) Oral daily  multivitamin 1 Tablet(s) Oral daily      Objective:    T(C): 36.6 (18 @ 13:41), Max: 37.7 (18 @ 16:29)  HR: 70 (18 @ 13:41) (70 - 88)  BP: 116/69 (18 @ 13:41) (91/58 - 116/69)  RR: 17 (18 @ 13:41) (16 - 18)  SpO2: 97% (18 @ 13:41) (97% - 100%)  Wt(kg): --      Physical Exam:  General: well developed well nourished, in no acute distress  Eyes: sclera anicteric, pupils equal and reactive to light  ENMT: buccal mucosa moist, pharynx not injected  Neck: supple, trachea midline  Lungs: clear, no wheeze/rhonchi  Cardiovascular: regular rate and rhythm, S1 S2  Abdomen: soft, nontender, no organomegaly present, bowel sounds normal  Neurological: alert and oriented x3, Cranial Nerves II-XII grossly intact  Skin: no increased ecchymosis/petechiae/purpura  Lymph Nodes: no palpable cervical/supraclavicular lymph nodes enlargements  Extremities: no cyanosis/clubbing/edema            LABS:                          11.1   10.4  )-----------( 291      ( 2018 08:03 )             34.0       04-    140  |  107  |  5<L>  ----------------------------<  85  4.3   |  27  |  0.74    Ca    8.5      2018 08:03  Phos  2.7     04-04  Mg     2.3     04-04    TPro  7.3  /  Alb  3.0<L>  /  TBili  0.4  /  DBili  x   /  AST  16  /  ALT  18  /  AlkPhos  64  04-04        Urinalysis Basic - ( 2018 09:46 )    Color: Yellow / Appearance: Slightly Turbid / S.015 / pH: x  Gluc: x / Ketone: Trace  / Bili: Negative / Urobili: Negative   Blood: x / Protein: Negative / Nitrite: Negative   Leuk Esterase: Moderate / RBC: 11-25 /HPF / WBC 26-50   Sq Epi: x / Non Sq Epi: Few / Bacteria: Moderate    Sedimentation Rate, Erythrocyte (18 @ 18:26)    Sedimentation Rate, Erythrocyte: 64 mm/hr    C-Reactive Protein, Serum (18 @ 22:48)    C-Reactive Protein, Serum: 6.80 mg/dL    Procalcitonin, Serum (18 @ 18:22)    Procalcitonin, Serum: <0.05      RECENT CULTURES:    Culture - Blood (collected 18 @ 12:11)  Source: .Blood Blood  Preliminary Report (18 @ 13:01):    No growth to date.    Culture - Blood (collected 18 @ 12:11)  Source: .Blood Blood  Preliminary Report (18 @ 13:01):    No growth to date.    Culture - Urine (collected 18 @ 12:03)  Source: .Urine Clean Catch (Midstream)  Preliminary Report (04-04-18 @ 11:48):    >100,000 CFU/ml Escherichia coli      RADIOLOGY & ADDITIONAL STUDIES:  CT Renal Stone Craven (18 @ 11:48)    Impression:    CT findings as discussed which may be secondary to urinary tract   infection/pyelonephritis. Cannot exclude recently passed ureteral stone.  No CT evidence for obstructive uropathy.    Other findings as discussed.      Assessment :     42 year old F with hx pyelonephritis in , and 2017 readmitted with bilateral  flank pain with fever and nausea and dysuria , had recent cystoscopy 1 week ago by Dr. Judd at [facility] . She just finished a course of Bactrim   Recurrent pyelonephritis? Infected renal cyst? UA is positive and all prior urine cs have been negative , except one in september  grew Staph Saprophyticus and was treated with Augmentin.    She has no signs of obstruction or nephrolithiasis Need to  concerned about Acute interstitial cystis / nephritis and other causes  of recurrent pyelonephritis . As she is from [facility] need to rule out renal TB     Plan :   - as she had recent instrumentation and has 103 fever will give broad spectrum antibiotics to cover ESBL or other MDRO GNR pathogens, seen commonly following urology procedures . Urine  cs  growing E coli ?? ESBL , will change to IV Invanz from AM  - consider obtaining pathology results fro the cystoscopy done recently   - check QuantiFeron sed rate, CRP .  - spoke to radiologist suggest to do Ct scan of abdomen with IV contrast  to look for renal abscess   - follow cs and continue with IV fluid   - trend CBC    Continue with present regime .  Appropriate use of antibiotics and adverse effects reviewed.      I have discussed the above plan of care with patient in detail. She expressed understanding of the  treatment plan . Risks, benefits and alternatives discussed in detail. I have asked if she has  any questions or concerns and appropriately addressed them to the best of my ability .    > 35 minutes were spent in direct patient care reviewing notes, medications ,labs data/ imaging , discussion with multidisciplinary team.    Thank you for allowing me to participate in care of your patient .    Ana Maria Fan MD  [phone]

## 2018-04-04 NOTE — PROGRESS NOTE ADULT - SUBJECTIVE AND OBJECTIVE BOX
42 YO F PMH recurrent UTi and post op-cystoscopy presents with abdominal and flank pain. Found to have elevated lactate and hypokalemic, CT shoing bilateral pyelonephritis.     Today she reports persistent groin and left flank pain. no fever chills NVD 44 YO F PMH recurrent UTI and post op-cystoscopy presents with abdominal and flank pain. Found to have elevated lactate and hypokalemic, CT shoing bilateral pyelonephritis.     Today she reports persistent groin and left flank pain. no fever chills NVD

## 2018-04-04 NOTE — PROGRESS NOTE ADULT - PROBLEM SELECTOR PLAN 1
Antibiotics tailored by ID Dr Meng METCALF UCx  Will call Dr Weil [facility] for Cystoscopy results  Previously seen by Dr Garcia, will re-consult Antibiotics tailored by ID Dr Fan.  Day 1 Ertapenem  FU UCx  Called Dr Chato Judd MD PhD Urology. he is away from office but staff will fax her records to [phone]  Previously seen by Dr Garcia, will re-consult Antibiotics tailored by ID Dr Fan.  Day 1 Ertapenem  FU UCx  Called Dr Chato Judd MD PhD Urology. he is away from office but staff faxed cysto report (now placed in pts paper chart)  Previously seen by Dr Garcia, but as she did not follow up with him, he reccomended calling the on-call urologist Dr Rosenberg.   When I called Dr Treviño office, he is away until 4/9/18 with Dr Garcia covering.    For now, cont ABX per ID and contemplate calling Dr Del Real, urology chair for input.

## 2018-04-05 DIAGNOSIS — D50.9 IRON DEFICIENCY ANEMIA, UNSPECIFIED: ICD-10-CM

## 2018-04-05 LAB
-  AMIKACIN: SIGNIFICANT CHANGE UP
-  AMOXICILLIN/CLAVULANIC ACID: SIGNIFICANT CHANGE UP
-  AMPICILLIN/SULBACTAM: SIGNIFICANT CHANGE UP
-  AMPICILLIN: SIGNIFICANT CHANGE UP
-  AZTREONAM: SIGNIFICANT CHANGE UP
-  CEFAZOLIN: SIGNIFICANT CHANGE UP
-  CEFEPIME: SIGNIFICANT CHANGE UP
-  CEFOXITIN: SIGNIFICANT CHANGE UP
-  CEFTRIAXONE: SIGNIFICANT CHANGE UP
-  CIPROFLOXACIN: SIGNIFICANT CHANGE UP
-  ERTAPENEM: SIGNIFICANT CHANGE UP
-  GENTAMICIN: SIGNIFICANT CHANGE UP
-  IMIPENEM: SIGNIFICANT CHANGE UP
-  LEVOFLOXACIN: SIGNIFICANT CHANGE UP
-  MEROPENEM: SIGNIFICANT CHANGE UP
-  NITROFURANTOIN: SIGNIFICANT CHANGE UP
-  PIPERACILLIN/TAZOBACTAM: SIGNIFICANT CHANGE UP
-  TIGECYCLINE: SIGNIFICANT CHANGE UP
-  TOBRAMYCIN: SIGNIFICANT CHANGE UP
-  TRIMETHOPRIM/SULFAMETHOXAZOLE: SIGNIFICANT CHANGE UP
ANION GAP SERPL CALC-SCNC: 7 MMOL/L — SIGNIFICANT CHANGE UP (ref 5–17)
APPEARANCE UR: CLEAR — SIGNIFICANT CHANGE UP
BACTERIA # UR AUTO: ABNORMAL
BASOPHILS # BLD AUTO: 0.1 K/UL — SIGNIFICANT CHANGE UP (ref 0–0.2)
BASOPHILS NFR BLD AUTO: 1.2 % — SIGNIFICANT CHANGE UP (ref 0–2)
BILIRUB UR-MCNC: NEGATIVE — SIGNIFICANT CHANGE UP
BUN SERPL-MCNC: 10 MG/DL — SIGNIFICANT CHANGE UP (ref 7–23)
CALCIUM SERPL-MCNC: 8.6 MG/DL — SIGNIFICANT CHANGE UP (ref 8.5–10.1)
CHLORIDE SERPL-SCNC: 104 MMOL/L — SIGNIFICANT CHANGE UP (ref 96–108)
CO2 SERPL-SCNC: 27 MMOL/L — SIGNIFICANT CHANGE UP (ref 22–31)
COLOR SPEC: YELLOW — SIGNIFICANT CHANGE UP
CREAT SERPL-MCNC: 0.73 MG/DL — SIGNIFICANT CHANGE UP (ref 0.5–1.3)
CULTURE RESULTS: SIGNIFICANT CHANGE UP
DIFF PNL FLD: NEGATIVE — SIGNIFICANT CHANGE UP
EOSINOPHIL # BLD AUTO: 0.1 K/UL — SIGNIFICANT CHANGE UP (ref 0–0.5)
EOSINOPHIL NFR BLD AUTO: 1.4 % — SIGNIFICANT CHANGE UP (ref 0–6)
EPI CELLS # UR: SIGNIFICANT CHANGE UP
GLUCOSE SERPL-MCNC: 90 MG/DL — SIGNIFICANT CHANGE UP (ref 70–99)
GLUCOSE UR QL: NEGATIVE — SIGNIFICANT CHANGE UP
HCT VFR BLD CALC: 34.3 % — LOW (ref 34.5–45)
HGB BLD-MCNC: 11.3 G/DL — LOW (ref 11.5–15.5)
KETONES UR-MCNC: NEGATIVE — SIGNIFICANT CHANGE UP
LEUKOCYTE ESTERASE UR-ACNC: ABNORMAL
LYMPHOCYTES # BLD AUTO: 2.8 K/UL — SIGNIFICANT CHANGE UP (ref 1–3.3)
LYMPHOCYTES # BLD AUTO: 35.8 % — SIGNIFICANT CHANGE UP (ref 13–44)
M TB TUBERC IFN-G BLD QL: 0 IU/ML — SIGNIFICANT CHANGE UP
M TB TUBERC IFN-G BLD QL: 0.06 IU/ML — SIGNIFICANT CHANGE UP
M TB TUBERC IFN-G BLD QL: NEGATIVE — SIGNIFICANT CHANGE UP
MCHC RBC-ENTMCNC: 28.1 PG — SIGNIFICANT CHANGE UP (ref 27–34)
MCHC RBC-ENTMCNC: 32.9 GM/DL — SIGNIFICANT CHANGE UP (ref 32–36)
MCV RBC AUTO: 85.4 FL — SIGNIFICANT CHANGE UP (ref 80–100)
METHOD TYPE: SIGNIFICANT CHANGE UP
MITOGEN IGNF BCKGRD COR BLD-ACNC: >10 IU/ML — SIGNIFICANT CHANGE UP
MONOCYTES # BLD AUTO: 0.8 K/UL — SIGNIFICANT CHANGE UP (ref 0–0.9)
MONOCYTES NFR BLD AUTO: 10.6 % — HIGH (ref 1–9)
NEUTROPHILS # BLD AUTO: 4 K/UL — SIGNIFICANT CHANGE UP (ref 1.8–7.4)
NEUTROPHILS NFR BLD AUTO: 51 % — SIGNIFICANT CHANGE UP (ref 43–77)
NITRITE UR-MCNC: NEGATIVE — SIGNIFICANT CHANGE UP
ORGANISM # SPEC MICROSCOPIC CNT: SIGNIFICANT CHANGE UP
ORGANISM # SPEC MICROSCOPIC CNT: SIGNIFICANT CHANGE UP
PH UR: 7 — SIGNIFICANT CHANGE UP (ref 5–8)
PLATELET # BLD AUTO: 286 K/UL — SIGNIFICANT CHANGE UP (ref 150–400)
POTASSIUM SERPL-MCNC: 4.1 MMOL/L — SIGNIFICANT CHANGE UP (ref 3.5–5.3)
POTASSIUM SERPL-SCNC: 4.1 MMOL/L — SIGNIFICANT CHANGE UP (ref 3.5–5.3)
PROT UR-MCNC: NEGATIVE — SIGNIFICANT CHANGE UP
RBC # BLD: 4.02 M/UL — SIGNIFICANT CHANGE UP (ref 3.8–5.2)
RBC # FLD: 11.8 % — SIGNIFICANT CHANGE UP (ref 10.3–14.5)
RBC CASTS # UR COMP ASSIST: SIGNIFICANT CHANGE UP /HPF (ref 0–4)
SODIUM SERPL-SCNC: 138 MMOL/L — SIGNIFICANT CHANGE UP (ref 135–145)
SP GR SPEC: 1 — LOW (ref 1.01–1.02)
SPECIMEN SOURCE: SIGNIFICANT CHANGE UP
UROBILINOGEN FLD QL: NEGATIVE — SIGNIFICANT CHANGE UP
WBC # BLD: 7.9 K/UL — SIGNIFICANT CHANGE UP (ref 3.8–10.5)
WBC # FLD AUTO: 7.9 K/UL — SIGNIFICANT CHANGE UP (ref 3.8–10.5)
WBC UR QL: SIGNIFICANT CHANGE UP

## 2018-04-05 PROCEDURE — 99233 SBSQ HOSP IP/OBS HIGH 50: CPT

## 2018-04-05 PROCEDURE — 74177 CT ABD & PELVIS W/CONTRAST: CPT | Mod: 26

## 2018-04-05 RX ORDER — CIPROFLOXACIN LACTATE 400MG/40ML
500 VIAL (ML) INTRAVENOUS EVERY 12 HOURS
Qty: 0 | Refills: 0 | Status: DISCONTINUED | OUTPATIENT
Start: 2018-04-06 | End: 2018-04-06

## 2018-04-05 RX ORDER — FLUCONAZOLE 150 MG/1
150 TABLET ORAL ONCE
Qty: 0 | Refills: 0 | Status: COMPLETED | OUTPATIENT
Start: 2018-04-05 | End: 2018-04-05

## 2018-04-05 RX ADMIN — Medication 650 MILLIGRAM(S): at 00:15

## 2018-04-05 RX ADMIN — FLUCONAZOLE 150 MILLIGRAM(S): 150 TABLET ORAL at 17:15

## 2018-04-05 RX ADMIN — ERTAPENEM SODIUM 120 MILLIGRAM(S): 1 INJECTION, POWDER, LYOPHILIZED, FOR SOLUTION INTRAMUSCULAR; INTRAVENOUS at 10:01

## 2018-04-05 RX ADMIN — Medication 1 TABLET(S): at 12:26

## 2018-04-05 RX ADMIN — Medication 325 MILLIGRAM(S): at 12:26

## 2018-04-05 RX ADMIN — PREGABALIN 1000 MICROGRAM(S): 225 CAPSULE ORAL at 12:26

## 2018-04-05 RX ADMIN — Medication 1 TABLET(S): at 17:15

## 2018-04-05 NOTE — PROGRESS NOTE ADULT - SUBJECTIVE AND OBJECTIVE BOX
LEYDA COLEMAN is a 43yFemale , patient examined and chart reviewed. Patient seen and examined today being followed for recurrent UTI and pyelonephritis       INTERVAL HPI/ OVERNIGHT EVENTS: Events noted, afebrile still with dysuria and vaginal itching ?? fungal infection .    PAST MEDICAL & SURGICAL HISTORY:  UTI (urinary tract infection)  Kidney infection  Abnormal cystoscopy: performed by Dr. Judd urology [facility]      For details regarding the patient's social history, family history, and other miscellaneous elements, please refer the initial infectious diseases consultation and/or the admitting history and physical examination for this admission.    ROS:  CONSTITUTIONAL:  Negative fever or chills, feels well, good appetite  EYES:  Negative  blurry vision or double vision  CARDIOVASCULAR:  Negative for chest pain or palpitations  RESPIRATORY:  Negative for cough, wheezing, or SOB   GASTROINTESTINAL:  Negative for nausea, vomiting, diarrhea, constipation, or abdominal pain  GENITOURINARY:  Negative frequency, urgency or dysuria  NEUROLOGIC:  No headache, confusion, dizziness, lightheadedness  All other systems were reviewed and are negative     Allergies:      Current inpatient medications :    ANTIBIOTICS/RELEVANT:  ertapenem  IVPB 1000 milliGRAM(s) IV Intermittent every 24 hours  fluconAZOLE   Tablet 150 milliGRAM(s) Oral once  lactobacillus acidophilus 1 Tablet(s) Oral two times a day with meals      acetaminophen   Tablet. 650 milliGRAM(s) Oral every 6 hours PRN  cyanocobalamin 1000 MICROGram(s) Oral daily  ferrous    sulfate 325 milliGRAM(s) Oral daily  multivitamin 1 Tablet(s) Oral daily      Objective:    T(C): 36.6 (04-05-18 @ 05:03), Max: 37 (04-04-18 @ 21:41)  HR: 61 (04-05-18 @ 05:03) (61 - 71)  BP: 95/62 (04-05-18 @ 05:03) (95/62 - 102/66)  RR: 18 (04-05-18 @ 05:03) (16 - 18)  SpO2: 98% (04-05-18 @ 05:03) (98% - 100%)  Wt(kg): --      Physical Exam:  General: well developed well nourished, in no acute distress  Eyes: sclera anicteric, pupils equal and reactive to light  ENMT: buccal mucosa moist, pharynx not injected  Neck: supple, trachea midline  Lungs: clear, no wheeze/rhonchi  Cardiovascular: regular rate and rhythm, S1 S2  Abdomen: soft, nontender, no organomegaly present, bowel sounds normal, no CVA tenderness   Neurological: alert and oriented x3, Cranial Nerves II-XII grossly intact  Skin: no increased ecchymosis/petechiae/purpura  Lymph Nodes: no palpable cervical/supraclavicular lymph nodes enlargements  Extremities: no cyanosis/clubbing/edema      LABS:                          11.3   7.9   )-----------( 286      ( 05 Apr 2018 06:19 )             34.3       04-05    138  |  104  |  10  ----------------------------<  90  4.1   |  27  |  0.73    Ca    8.6      05 Apr 2018 06:19  Phos  2.7     04-04  Mg     2.3     04-04    TPro  7.3  /  Alb  3.0<L>  /  TBili  0.4  /  DBili  x   /  AST  16  /  ALT  18  /  AlkPhos  64  04-04    Quantiferon - Gold Tuberculosis (04.03.18 @ 21:38)    Quantiferon - Gold Tuberculosis Result: Negative:   RECENT CULTURES:    Culture - Acid Fast - Urine (collected 04-04-18 @ 14:46)  Source: .Urine Urine cup    Culture - Blood (collected 04-03-18 @ 12:11)  Source: .Blood Blood  Preliminary Report (04-04-18 @ 13:01):    No growth to date.    Culture - Blood (collected 04-03-18 @ 12:11)  Source: .Blood Blood  Preliminary Report (04-04-18 @ 13:01):    No growth to date.    Culture - Urine (collected 04-03-18 @ 12:03)  Source: .Urine Clean Catch (Midstream)  Final Report (04-05-18 @ 09:23):    >100,000 CFU/ml Escherichia coli  Organism: Escherichia coli (04-05-18 @ 09:23)  Organism: Escherichia coli (04-05-18 @ 09:23)      -  Amikacin: S <=8      -  Amoxicillin/Clavulanic Acid: S <=8/4      -  Ampicillin: R >16 These ampicillin results predict results for amoxicillin      -  Ampicillin/Sulbactam: R >16/8      -  Aztreonam: S <=4      -  Cefazolin: S 8 This predicts results for oral agents cefaclor, cefdinir, cefpodoxime, cefprozil, cefuroxime axetil, cephalexin and locarbef for uncomplicated UTI. Note that some isolates may be susceptible to these agents while testing resistant to cefazolin.      -  Cefepime: S <=2      -  Cefoxitin: S <=4      -  Ceftriaxone: S <=1 Enterobacter, Citrobacter, and Serratia may develop resistance during prolonged therapy      -  Ciprofloxacin: S <=0.5      -  Ertapenem: S <=0.5      -  Gentamicin: S <=1      -  Imipenem: S <=1      -  Levofloxacin: S <=1      -  Meropenem: S <=1      -  Nitrofurantoin: S <=32 Should not be used to treat pyelonephritis      -  Piperacillin/Tazobactam: S <=8      -  Tigecycline: S <=1      -  Tobramycin: S <=2      -  Trimethoprim/Sulfamethoxazole: R >2/38      Method Type: JORGE        RADIOLOGY & ADDITIONAL STUDIES:  CT Abdomen and Pelvis w/ IV Cont (04.05.18 @ 11:41) >  COMPARISON: CT scan 4/3/2018 and12/2/2017    FINDINGS:      The liver, spleen and gallbladder appear unremarkable.    The adrenal glands and pancreas are unremarkable in appearance.    There is patchy decreased enhancement of the kidney, particularly at the   interpolar and upper pole. Finding is most compatible with   infection/pyelonephritis.  No intrarenal or perinephric fluid collection is noted.  There is a small, indeterminate  subcentimeter hypodense focus at the   midpole the left kidney, stable in appearance, likely representing a   renal cyst.  No hydronephrosis or obstructing ureteral calculus is noted.    The abdominal aorta is normal in caliber. No enlarged retroperitoneal   lymphadenopathy is noted.      Evaluation of the stomach and gastrointestinal tract is limited without   distention.  No bowel obstruction is noted.  The localized intra-abdominal fluid collection or pneumoperitoneum is   noted.    There is a normal-appearing appendix.    The urinary bladder appears unremarkable.  No free fluid is notedwithin the pelvis.    Impression:    CT findings compatible with pyelonephritis left kidney.  No drainable fluid collection noted.      Assessment :    42 year old F with hx pyelonephritis in 9/17, and 12/2017 readmitted with bilateral  flank pain with fever and nausea and dysuria , had recent cystoscopy 1 week ago by Dr. Judd at [facility] . She just finished a course of Bactrim   Recurrent pyelonephritis? Infected renal cyst? UA is positive and all prior urine cs have been negative , except one in september  grew Staph Saprophyticus and was treated with Augmentin.    She has no signs of obstruction or nephrolithiasis Need to  concerned about Acute interstitial cystis / nephritis and other causes  of recurrent pyelonephritis . As she is from [facility] need to rule out renal TB. Urine AFB sent , afb smear not done on urine by lab , her quantiferon TB gold test is negative     By CT scan she has acute left pyelonephritis and she has left kidney midpole cyst .     She may have vaginal candidiasis as a cause of vaginal itching       Plan :   - based on sensitivity will change to Cipro 500 mg bid x 10 days to give total 14 days of therapy   - will repeat UA to see if any improvement    - consider obtaining pathology results fro the cystoscopy done recently   - will give one dose of diflucan, she may need vaginal exam , she requests female MD  - trend CBC    Continue with present regime .  Appropriate use of antibiotics and adverse effects reviewed.    I have discussed the above plan of care with patient in detail. He  expressed understanding of the  treatment plan . Risks, benefits and alternatives discussed in detail. I have asked if she has  any questions or concerns and appropriately addressed them to the best of my ability .    > 25  minutes were spent in direct patient care reviewing notes, medications ,labs data/ imaging , discussion with multidisciplinary team.    Thank you for allowing me to participate in care of your patient .    Ana Maria Fan MD  [phone]

## 2018-04-05 NOTE — PROGRESS NOTE ADULT - SUBJECTIVE AND OBJECTIVE BOX
PCP:  Dr. NANNETTE Hallman     [phone]/fax [phone]     Notified Yes  [x ]       April 5, 2018  Consultant: Dr. NAT Fan    CC: Patient is a 43y old  Female who presents with a chief complaint of urinary tract infection (03 Apr 2018 17:11)    HPI: 43 year old F with hx pyelonephritis in 9/17, and 12/2017 readmitted with bilateral  flank pain with fever and nausea and dysuria , had recent cystoscopy 1 week ago by Dr. Judd at [facility] . She just finished a course of Bactrim   Recurrent pyelonephritis? Infected renal cyst? UA is positive and all prior urine cs have been negative , except one in september  grew Staph Saprophyticus and was treated with Augmentin presents with abdominal and flank pain. Found to have elevated lactate and hypokalemic, CT shoing bilateral pyelonephritis.     4/4/18 Today she reports persistent groin and left flank pain. no fever chills NVD    INTERVAL HPI:  OVERNIGHT EVENTS: 4/5/18       REVIEW OF SYSTEMS is negative except what mentioned in HPI.     T(C): 36.6 (04-05-18 @ 05:03), Max: 37 (04-04-18 @ 21:41)  HR: 61 (04-05-18 @ 05:03) (61 - 71)  BP: 95/62 (04-05-18 @ 05:03) (95/62 - 102/66)  RR: 18 (04-05-18 @ 05:03) (16 - 18)  SpO2: 98% (04-05-18 @ 05:03) (98% - 100%)  Wt(kg): --  I&O's Summary    PHYSICAL EXAM:  GENERAL: NAD, well-groomed, well-developed  HEAD:  Atraumatic, Normocephalic  EYES: EOMI, PERRLA, conjunctiva and sclera clear  ENMT: No tonsillar erythema, exudates, or enlargement; Moist mucous membranes. No lesions.  NECK: Supple, No JVD, Normal thyroid  NERVOUS SYSTEM:  Alert & Oriented X3, Good concentration; Motor Strength 5/5 B/L upper and lower extremities.  CHEST/LUNG: Clear to percussion bilaterally; No rales, rhonchi, wheezing, or rubs  HEART: Regular rate and rhythm; No murmurs, rubs, or gallops  ABDOMEN: Soft, Nontender, Nondistended; Bowel sounds present  EXTREMITIES:  2+ Peripheral Pulses, No clubbing, cyanosis, or edema  LYMPH: No lymphadenopathy noted  SKIN: No rashes or lesions    LABS:                        11.3   7.9   )-----------( 286      ( 05 Apr 2018 06:19 )             34.3     04-05    138  |  104  |  10  ----------------------------<  90  4.1   |  27  |  0.73    Ca    8.6      05 Apr 2018 06:19  Phos  2.7     04-04  Mg     2.3     04-04    TPro  7.3  /  Alb  3.0<L>  /  TBili  0.4  /  DBili  x   /  AST  16  /  ALT  18  /  AlkPhos  64  04-04        CAPILLARY BLOOD GLUCOSE          04-03 @ 12:11   No growth to date.  --  --  04-03 @ 12:03   >100,000 CFU/ml Escherichia coli  --  Escherichia coli    Active Medications  MEDICATIONS  (STANDING):  cyanocobalamin 1000 MICROGram(s) Oral daily  ertapenem  IVPB 1000 milliGRAM(s) IV Intermittent every 24 hours  ferrous    sulfate 325 milliGRAM(s) Oral daily  lactobacillus acidophilus 1 Tablet(s) Oral two times a day with meals  multivitamin 1 Tablet(s) Oral daily    MEDICATIONS  (PRN):  acetaminophen   Tablet. 650 milliGRAM(s) Oral every 6 hours PRN Mild Pain (1 - 3) PCP:  Dr. NANNETTE Hallman     [phone]/fax [phone]     Notified Yes  [x ]       April 5, 2018  Consultant: Dr. NAT Fan    CC: Patient is a 43y old  Female who presents with a chief complaint of urinary tract infection (03 Apr 2018 17:11)    HPI: 43 year old F with hx Iron deficiency anemia and pyelonephritis in 9/17, and 12/2017 readmitted with bilateral  flank pain with fever and nausea and dysuria , had recent cystoscopy 1 week ago by Dr. Judd at [facility] . She just finished a course of Bactrim   Recurrent pyelonephritis? Infected renal cyst? UA is positive and all prior urine cs have been negative , except one in september  grew Staph Saprophyticus and was treated with Augmentin presents with abdominal and flank pain. Found to have elevated lactate and hypokalemic, CT shoing bilateral pyelonephritis.     4/4/18 Today she reports persistent groin and left flank pain. no fever chills NVD    INTERVAL HPI:  OVERNIGHT EVENTS: 4/5/18 Chart reviewed and events noted. Patient is feeling better and observed walking on the floor. Improved dysuria and nausea. Tolerating diet. No chest pain or shortness of breath.       REVIEW OF SYSTEMS is negative except what mentioned in HPI.     T(C): 36.6 (04-05-18 @ 05:03), Max: 37 (04-04-18 @ 21:41)  HR: 61 (04-05-18 @ 05:03) (61 - 71)  BP: 95/62 (04-05-18 @ 05:03) (95/62 - 102/66)  RR: 18 (04-05-18 @ 05:03) (16 - 18)  SpO2: 98% (04-05-18 @ 05:03) (98% - 100%)  Wt(kg): --  I&O's Summary    PHYSICAL EXAM:  GENERAL: NAD, well-groomed, well-developed  HEAD:  Atraumatic, Normocephalic  EYES: EOMI, PERRLA, conjunctiva and sclera clear  ENMT: No tonsillar erythema, exudates, or enlargement; Moist mucous membranes. No lesions.  NECK: Supple, No JVD, Normal thyroid  NERVOUS SYSTEM:  Alert & Oriented X3, Good concentration; Motor Strength 5/5 B/L upper and lower extremities.  CHEST/LUNG: Clear to percussion bilaterally; No rales, rhonchi, wheezing, or rubs  HEART: Regular rate and rhythm; No murmurs, rubs, or gallops  ABDOMEN: Soft, Nontender, Nondistended; Bowel sounds present  EXTREMITIES:  2+ Peripheral Pulses, No clubbing, cyanosis, or edema  LYMPH: No lymphadenopathy noted  SKIN: No rashes or lesions    LABS:                        11.3   7.9   )-----------( 286      ( 05 Apr 2018 06:19 )             34.3     04-05    138  |  104  |  10  ----------------------------<  90  4.1   |  27  |  0.73    Ca    8.6      05 Apr 2018 06:19  Phos  2.7     04-04  Mg     2.3     04-04    TPro  7.3  /  Alb  3.0<L>  /  TBili  0.4  /  DBili  x   /  AST  16  /  ALT  18  /  AlkPhos  64  04-04        CAPILLARY BLOOD GLUCOSE          04-03 @ 12:11   No growth to date.  --  --  04-03 @ 12:03   >100,000 CFU/ml Escherichia coli  --  Escherichia coli    Active Medications  MEDICATIONS  (STANDING):  cyanocobalamin 1000 MICROGram(s) Oral daily  ertapenem  IVPB 1000 milliGRAM(s) IV Intermittent every 24 hours  ferrous    sulfate 325 milliGRAM(s) Oral daily  lactobacillus acidophilus 1 Tablet(s) Oral two times a day with meals  multivitamin 1 Tablet(s) Oral daily    MEDICATIONS  (PRN):  acetaminophen   Tablet. 650 milliGRAM(s) Oral every 6 hours PRN Mild Pain (1 - 3)

## 2018-04-05 NOTE — PROGRESS NOTE ADULT - ASSESSMENT
42 YO F with bilateral pyelonephritis. 44 YO F with history of Iron deficiency anemia and recurrent UTI admitted with left pyelonephritis.

## 2018-04-05 NOTE — PROGRESS NOTE ADULT - ATTENDING COMMENTS
Diet: Regular.    RADIOLOGY & ADDITIONAL TESTS:    Imaging Personally Reviewed:  [x ] YES  [ ] NO    Consultant(s) Notes Reviewed:  [x ] YES  [ ] NO      DVT Prophylaxis:  Subcu Heparin [  ]     LMWH [ ]     Coumadin [ ]    Xaeralto [ ]    Eliquis [ ]   Venodyne pumps [x ]    Discussed with Patient [x ]     Family [x ]          [ ]   RN[x ]      [ ]    Advance Directives: Full code.     Care Discussed with Consultants/Other Providers [ x] YES  [ ] NO        Dr. Fan and RN. Diet: Regular.    RADIOLOGY & ADDITIONAL TESTS:    Imaging Personally Reviewed:  [x ] YES  [ ] NO    Consultant(s) Notes Reviewed:  [x ] YES  [ ] NO      DVT Prophylaxis:  Subcu Heparin [  ]     LMWH [ ]     Coumadin [ ]    Xaeralto [ ]    Eliquis [ ]   Venodyne pumps [x ]    Discussed with Patient [x ]     Family [x ]          [ ]   RN[x ]      [ ]    Advance Directives: Full code.     Care Discussed with Consultants/Other Providers [ x] YES  [ ] NO        Dr. Fan and RN. PMD notified.     Called and left message for  on patient request.

## 2018-04-06 ENCOUNTER — TRANSCRIPTION ENCOUNTER (OUTPATIENT)
Age: 43
End: 2018-04-06

## 2018-04-06 VITALS
HEART RATE: 87 BPM | TEMPERATURE: 98 F | OXYGEN SATURATION: 100 % | RESPIRATION RATE: 17 BRPM | SYSTOLIC BLOOD PRESSURE: 101 MMHG | DIASTOLIC BLOOD PRESSURE: 70 MMHG

## 2018-04-06 LAB
ANION GAP SERPL CALC-SCNC: 8 MMOL/L — SIGNIFICANT CHANGE UP (ref 5–17)
BASOPHILS # BLD AUTO: 0.1 K/UL — SIGNIFICANT CHANGE UP (ref 0–0.2)
BASOPHILS NFR BLD AUTO: 0.9 % — SIGNIFICANT CHANGE UP (ref 0–2)
BUN SERPL-MCNC: 9 MG/DL — SIGNIFICANT CHANGE UP (ref 7–23)
CALCIUM SERPL-MCNC: 9 MG/DL — SIGNIFICANT CHANGE UP (ref 8.5–10.1)
CHLORIDE SERPL-SCNC: 105 MMOL/L — SIGNIFICANT CHANGE UP (ref 96–108)
CO2 SERPL-SCNC: 27 MMOL/L — SIGNIFICANT CHANGE UP (ref 22–31)
CREAT SERPL-MCNC: 0.64 MG/DL — SIGNIFICANT CHANGE UP (ref 0.5–1.3)
EOSINOPHIL # BLD AUTO: 0.1 K/UL — SIGNIFICANT CHANGE UP (ref 0–0.5)
EOSINOPHIL NFR BLD AUTO: 1.6 % — SIGNIFICANT CHANGE UP (ref 0–6)
GLUCOSE SERPL-MCNC: 85 MG/DL — SIGNIFICANT CHANGE UP (ref 70–99)
HCT VFR BLD CALC: 35 % — SIGNIFICANT CHANGE UP (ref 34.5–45)
HGB BLD-MCNC: 11.5 G/DL — SIGNIFICANT CHANGE UP (ref 11.5–15.5)
LYMPHOCYTES # BLD AUTO: 3 K/UL — SIGNIFICANT CHANGE UP (ref 1–3.3)
LYMPHOCYTES # BLD AUTO: 37 % — SIGNIFICANT CHANGE UP (ref 13–44)
MCHC RBC-ENTMCNC: 28.3 PG — SIGNIFICANT CHANGE UP (ref 27–34)
MCHC RBC-ENTMCNC: 32.9 GM/DL — SIGNIFICANT CHANGE UP (ref 32–36)
MCV RBC AUTO: 86 FL — SIGNIFICANT CHANGE UP (ref 80–100)
MONOCYTES # BLD AUTO: 0.7 K/UL — SIGNIFICANT CHANGE UP (ref 0–0.9)
MONOCYTES NFR BLD AUTO: 8.7 % — SIGNIFICANT CHANGE UP (ref 1–9)
NEUTROPHILS # BLD AUTO: 4.2 K/UL — SIGNIFICANT CHANGE UP (ref 1.8–7.4)
NEUTROPHILS NFR BLD AUTO: 51.9 % — SIGNIFICANT CHANGE UP (ref 43–77)
PLATELET # BLD AUTO: 351 K/UL — SIGNIFICANT CHANGE UP (ref 150–400)
POTASSIUM SERPL-MCNC: 4 MMOL/L — SIGNIFICANT CHANGE UP (ref 3.5–5.3)
POTASSIUM SERPL-SCNC: 4 MMOL/L — SIGNIFICANT CHANGE UP (ref 3.5–5.3)
RBC # BLD: 4.07 M/UL — SIGNIFICANT CHANGE UP (ref 3.8–5.2)
RBC # FLD: 11.8 % — SIGNIFICANT CHANGE UP (ref 10.3–14.5)
SODIUM SERPL-SCNC: 140 MMOL/L — SIGNIFICANT CHANGE UP (ref 135–145)
WBC # BLD: 8.1 K/UL — SIGNIFICANT CHANGE UP (ref 3.8–10.5)
WBC # FLD AUTO: 8.1 K/UL — SIGNIFICANT CHANGE UP (ref 3.8–10.5)

## 2018-04-06 PROCEDURE — 96375 TX/PRO/DX INJ NEW DRUG ADDON: CPT

## 2018-04-06 PROCEDURE — 85027 COMPLETE CBC AUTOMATED: CPT

## 2018-04-06 PROCEDURE — 80053 COMPREHEN METABOLIC PANEL: CPT

## 2018-04-06 PROCEDURE — 81001 URINALYSIS AUTO W/SCOPE: CPT

## 2018-04-06 PROCEDURE — 87116 MYCOBACTERIA CULTURE: CPT

## 2018-04-06 PROCEDURE — 83036 HEMOGLOBIN GLYCOSYLATED A1C: CPT

## 2018-04-06 PROCEDURE — 96365 THER/PROPH/DIAG IV INF INIT: CPT

## 2018-04-06 PROCEDURE — 84145 PROCALCITONIN (PCT): CPT

## 2018-04-06 PROCEDURE — 84702 CHORIONIC GONADOTROPIN TEST: CPT

## 2018-04-06 PROCEDURE — 87015 SPECIMEN INFECT AGNT CONCNTJ: CPT

## 2018-04-06 PROCEDURE — 84100 ASSAY OF PHOSPHORUS: CPT

## 2018-04-06 PROCEDURE — 85652 RBC SED RATE AUTOMATED: CPT

## 2018-04-06 PROCEDURE — 86480 TB TEST CELL IMMUN MEASURE: CPT

## 2018-04-06 PROCEDURE — 87040 BLOOD CULTURE FOR BACTERIA: CPT

## 2018-04-06 PROCEDURE — 87086 URINE CULTURE/COLONY COUNT: CPT

## 2018-04-06 PROCEDURE — 83735 ASSAY OF MAGNESIUM: CPT

## 2018-04-06 PROCEDURE — 87186 SC STD MICRODIL/AGAR DIL: CPT

## 2018-04-06 PROCEDURE — 86703 HIV-1/HIV-2 1 RESULT ANTBDY: CPT

## 2018-04-06 PROCEDURE — 83605 ASSAY OF LACTIC ACID: CPT

## 2018-04-06 PROCEDURE — 99239 HOSP IP/OBS DSCHRG MGMT >30: CPT

## 2018-04-06 PROCEDURE — 74177 CT ABD & PELVIS W/CONTRAST: CPT

## 2018-04-06 PROCEDURE — 80048 BASIC METABOLIC PNL TOTAL CA: CPT

## 2018-04-06 PROCEDURE — 87206 SMEAR FLUORESCENT/ACID STAI: CPT

## 2018-04-06 PROCEDURE — 86140 C-REACTIVE PROTEIN: CPT

## 2018-04-06 PROCEDURE — 74176 CT ABD & PELVIS W/O CONTRAST: CPT

## 2018-04-06 PROCEDURE — 99285 EMERGENCY DEPT VISIT HI MDM: CPT | Mod: 25

## 2018-04-06 RX ORDER — ACETAMINOPHEN 500 MG
2 TABLET ORAL
Qty: 0 | Refills: 0 | DISCHARGE
Start: 2018-04-06

## 2018-04-06 RX ORDER — LACTOBACILLUS ACIDOPHILUS 100MM CELL
1 CAPSULE ORAL
Qty: 0 | Refills: 0 | DISCHARGE
Start: 2018-04-06

## 2018-04-06 RX ORDER — CIPROFLOXACIN LACTATE 400MG/40ML
1 VIAL (ML) INTRAVENOUS
Qty: 20 | Refills: 0 | OUTPATIENT
Start: 2018-04-06 | End: 2018-04-15

## 2018-04-06 RX ADMIN — Medication 1 TABLET(S): at 11:02

## 2018-04-06 RX ADMIN — Medication 500 MILLIGRAM(S): at 05:31

## 2018-04-06 RX ADMIN — Medication 1 TABLET(S): at 09:10

## 2018-04-06 RX ADMIN — Medication 325 MILLIGRAM(S): at 11:02

## 2018-04-06 RX ADMIN — PREGABALIN 1000 MICROGRAM(S): 225 CAPSULE ORAL at 11:02

## 2018-04-06 NOTE — DISCHARGE NOTE ADULT - ADDITIONAL INSTRUCTIONS
Patient will call for appointment next week with PMD Dr. Jj Hallman     [phone]/fax [phone]  with CBC and BMP.  See Dr. Judd for urology follow up.  See gyn for examination.

## 2018-04-06 NOTE — DISCHARGE NOTE ADULT - CARE PLAN
Principal Discharge DX:	Sepsis, due to unspecified organism  Goal:	Resolved.  Assessment and plan of treatment:	Due to E. Coli UTI/pyelonephritis. Finish antibiotic and follow with urology and gyn.  Secondary Diagnosis:	Pyelonephritis  Assessment and plan of treatment:	Due to E. Coli. Finish antibiotic and follow with urology and gyn.  Secondary Diagnosis:	Hypokalemia  Assessment and plan of treatment:	Resolved. Monitor out patient.  Secondary Diagnosis:	Iron deficiency anemia, unspecified iron deficiency anemia type  Assessment and plan of treatment:	Stable on iron and vitamin. Follow with PMD. Principal Discharge DX:	Sepsis, due to unspecified organism  Goal:	Resolved.  Assessment and plan of treatment:	Due to E. Coli UTI/pyelonephritis. Finish antibiotic and follow with urology and gyn.  Secondary Diagnosis:	Pyelonephritis  Assessment and plan of treatment:	Due to E. Coli. Finish antibiotic and follow with urology and gyn. Unable to comment relationship with instrumentation due to time frame and previous UTIs.  Secondary Diagnosis:	Hypokalemia  Assessment and plan of treatment:	Resolved. Monitor out patient.  Secondary Diagnosis:	Iron deficiency anemia, unspecified iron deficiency anemia type  Assessment and plan of treatment:	Stable on iron and vitamin. Follow with PMD.

## 2018-04-06 NOTE — DISCHARGE NOTE ADULT - PATIENT PORTAL LINK FT
You can access the [facility] Patient Portal, offered by [facility], by registering with the following website: [email or web address]

## 2018-04-06 NOTE — DISCHARGE NOTE ADULT - HOSPITAL COURSE
PCP:  Dr. NANNETTE Hallman     [phone]/fax [phone]     Notified Yes  [x ]       April 5, 2018  Consultant: Dr. NAT Fan    CC: Patient is a 43y old  Female who presents with a chief complaint of urinary tract infection (03 Apr 2018 17:11)    HPI: 43 year old F with hx Iron deficiency anemia and pyelonephritis in 9/17, and 12/2017 readmitted with bilateral  flank pain with fever and nausea and dysuria , had recent cystoscopy 1 week ago by Dr. Judd at [facility] . She just finished a course of Bactrim   Recurrent pyelonephritis? Infected renal cyst? UA is positive and all prior urine cs have been negative , except one in september  grew Staph Saprophyticus and was treated with Augmentin presents with abdominal and flank pain. Found to have elevated lactate and hypokalemic, CT shoing bilateral pyelonephritis.     4/4/18 Today she reports persistent groin and left flank pain. no fever chills NVD    INTERVAL HPI:  OVERNIGHT EVENTS: 4/5/18 Chart reviewed and events noted. Patient is feeling better and observed walking on the floor. Improved dysuria and nausea. Tolerating diet. No chest pain or shortness of breath.       REVIEW OF SYSTEMS is negative except what mentioned in HPI.     T(C): 36.6 (04-05-18 @ 05:03), Max: 37 (04-04-18 @ 21:41)  HR: 61 (04-05-18 @ 05:03) (61 - 71)  BP: 95/62 (04-05-18 @ 05:03) (95/62 - 102/66)  RR: 18 (04-05-18 @ 05:03) (16 - 18)  SpO2: 98% (04-05-18 @ 05:03) (98% - 100%)  Wt(kg): --  I&O's Summary    PHYSICAL EXAM:  GENERAL: NAD, well-groomed, well-developed  HEAD:  Atraumatic, Normocephalic  EYES: EOMI, PERRLA, conjunctiva and sclera clear  ENMT: No tonsillar erythema, exudates, or enlargement; Moist mucous membranes. No lesions.  NECK: Supple, No JVD, Normal thyroid  NERVOUS SYSTEM:  Alert & Oriented X3, Good concentration; Motor Strength 5/5 B/L upper and lower extremities.  CHEST/LUNG: Clear to percussion bilaterally; No rales, rhonchi, wheezing, or rubs  HEART: Regular rate and rhythm; No murmurs, rubs, or gallops  ABDOMEN: Soft, Nontender, Nondistended; Bowel sounds present  EXTREMITIES:  2+ Peripheral Pulses, No clubbing, cyanosis, or edema  LYMPH: No lymphadenopathy noted  SKIN: No rashes or lesions PCP:  Dr. NANNETTE Hallman     [phone]/fax [phone]     Notified Yes  [x ]       April 5, 2018  Consultant: Dr. NAT Fan    CC: Patient is a 43y old  Female who presents with a chief complaint of urinary tract infection (03 Apr 2018 17:11)    HPI: 43 year old F with hx Iron deficiency anemia and pyelonephritis in 9/17, and 12/2017 readmitted with bilateral  flank pain with fever and nausea and dysuria , had recent cystoscopy 1 week ago by Dr. Judd at [facility] . She just finished a course of Bactrim   Recurrent pyelonephritis? Infected renal cyst? UA is positive and all prior urine cs have been negative , except one in september  grew Staph Saprophyticus and was treated with Augmentin presents with abdominal and flank pain. Found to have elevated lactate and hypokalemic, CT shoing bilateral pyelonephritis.       T(C): 36.9 (04-06-18 @ 04:39), Max: 36.9 (04-06-18 @ 04:39)  HR: 68 (04-06-18 @ 04:39) (68 - 74)  BP: 106/72 (04-06-18 @ 04:39) (97/57 - 106/72)  RR: 17 (04-06-18 @ 04:39) (17 - 19)  SpO2: 98% (04-06-18 @ 04:39) (95% - 98%)  Wt(kg): --  PHYSICAL EXAM:  GENERAL: NAD, well-groomed, well-developed  HEAD:  Atraumatic, Normocephalic  EYES: EOMI, PERRLA, conjunctiva and sclera clear  ENMT: No tonsillar erythema, exudates, or enlargement; Moist mucous membranes. No lesions.  NECK: Supple, No JVD, Normal thyroid  NERVOUS SYSTEM:  Alert & Oriented X3, Good concentration; Motor Strength 5/5 B/L upper and lower extremities.  CHEST/LUNG: Clear to percussion bilaterally; No rales, rhonchi, wheezing, or rubs  HEART: Regular rate and rhythm; No murmurs, rubs, or gallops  ABDOMEN: Soft, Nontender, Nondistended; Bowel sounds present  EXTREMITIES:  2+ Peripheral Pulses, No clubbing, cyanosis, or edema  LYMPH: No lymphadenopathy noted  SKIN: No rashes or lesions    Patient was treated with IV fluid and broad spectrum antibiotic initially for sepsis due to UTI and responded. CT consistent with left pyelonephritis due to E. Coli sensitive to Cipro. Patient will be discharged today with out patient follow up. Patient was also treated for vaginal candidiasis. Education provided about self care to reduce recurrent UTI and follow up with PMD, urologist and gyn.  PMD was contacted and updated as well as . Patient and  in agreement. PCP:  Dr. NANNETTE Hallman     [phone]/fax [phone]     Notified Yes  [x ]       April 5, 2018  Consultant: Dr. NAT Fan    CC: Patient is a 43y old  Female who presents with a chief complaint of urinary tract infection (03 Apr 2018 17:11)    HPI: 43 year old F with hx Iron deficiency anemia and pyelonephritis in 9/17, and 12/2017 readmitted with bilateral  flank pain with fever and nausea and dysuria , had recent cystoscopy 1 week ago by Dr. Judd at [facility] . She just finished a course of Bactrim   Recurrent pyelonephritis? Infected renal cyst? UA is positive and all prior urine cs have been negative , except one in september  grew Staph Saprophyticus and was treated with Augmentin presents with abdominal and flank pain. Found to have elevated lactate and hypokalemic, CT shoing bilateral pyelonephritis.       T(C): 36.9 (04-06-18 @ 04:39), Max: 36.9 (04-06-18 @ 04:39)  HR: 68 (04-06-18 @ 04:39) (68 - 74)  BP: 106/72 (04-06-18 @ 04:39) (97/57 - 106/72)  RR: 17 (04-06-18 @ 04:39) (17 - 19)  SpO2: 98% (04-06-18 @ 04:39) (95% - 98%)  Wt(kg): --  PHYSICAL EXAM:  GENERAL: NAD, well-groomed, well-developed  HEAD:  Atraumatic, Normocephalic  EYES: EOMI, PERRLA, conjunctiva and sclera clear  ENMT: No tonsillar erythema, exudates, or enlargement; Moist mucous membranes. No lesions.  NECK: Supple, No JVD, Normal thyroid  NERVOUS SYSTEM:  Alert & Oriented X3, Good concentration; Motor Strength 5/5 B/L upper and lower extremities.  CHEST/LUNG: Clear to percussion bilaterally; No rales, rhonchi, wheezing, or rubs  HEART: Regular rate and rhythm; No murmurs, rubs, or gallops  ABDOMEN: Soft, Nontender, Nondistended; Bowel sounds present  EXTREMITIES:  2+ Peripheral Pulses, No clubbing, cyanosis, or edema  LYMPH: No lymphadenopathy noted  SKIN: No rashes or lesions    Patient was treated with IV fluid and broad spectrum antibiotic initially for sepsis due to UTI and responded. CT consistent with left pyelonephritis due to E. Coli sensitive to Cipro. Patient will be discharged today with out patient follow up. Patient was also treated for vaginal candidiasis. Education provided about self care to reduce recurrent UTI and follow up with PMD, urologist and gyn.  PMD was contacted and updated as well as . Patient and  in agreement. Educated about side effects including achillis tendonitis and C. Difficile colitis and use low fat yogurt.

## 2018-04-06 NOTE — DISCHARGE NOTE ADULT - PLAN OF CARE
Resolved. Due to E. Coli UTI/pyelonephritis. Finish antibiotic and follow with urology and gyn. Due to E. Coli. Finish antibiotic and follow with urology and gyn. Resolved. Monitor out patient. Stable on iron and vitamin. Follow with PMD. Due to E. Coli. Finish antibiotic and follow with urology and gyn. Unable to comment relationship with instrumentation due to time frame and previous UTIs.

## 2018-04-06 NOTE — PROGRESS NOTE ADULT - SUBJECTIVE AND OBJECTIVE BOX
LEYDA COLEMAN is a 43yFemale , patient examined and chart reviewed. Patient seen and examined today being followed for recurrent pyelonephritis       INTERVAL HPI/ OVERNIGHT EVENTS: Events noted, feels better, still with vaginal itching .    PAST MEDICAL & SURGICAL HISTORY:  UTI (urinary tract infection)  Kidney infection  Abnormal cystoscopy: performed by Dr. Judd urology [facility]      For details regarding the patient's social history, family history, and other miscellaneous elements, please refer the initial infectious diseases consultation and/or the admitting history and physical examination for this admission.      ROS:  CONSTITUTIONAL:  Negative fever or chills, feels well, good appetite  EYES:  Negative  blurry vision or double vision  CARDIOVASCULAR:  Negative for chest pain or palpitations  RESPIRATORY:  Negative for cough, wheezing, or SOB   GASTROINTESTINAL:  Negative for nausea, vomiting, diarrhea, constipation, or abdominal pain  GENITOURINARY:  Negative frequency, urgency or dysuria  NEUROLOGIC:  No headache, confusion, dizziness, lightheadedness  All other systems were reviewed and are negative     Allergies:      Current inpatient medications :    ANTIBIOTICS/RELEVANT:  ciprofloxacin     Tablet 500 milliGRAM(s) Oral every 12 hours  lactobacillus acidophilus 1 Tablet(s) Oral two times a day with meals      acetaminophen   Tablet. 650 milliGRAM(s) Oral every 6 hours PRN  cyanocobalamin 1000 MICROGram(s) Oral daily  ferrous    sulfate 325 milliGRAM(s) Oral daily  multivitamin 1 Tablet(s) Oral daily      Objective:    T(C): 36.9 (18 @ 04:39), Max: 36.9 (18 @ 04:39)  HR: 68 (18 @ 04:39) (68 - 74)  BP: 97/57 (18 @ 04:39) (97/57 - 106/72)  RR: 17 (18 @ 04:39) (17 - 19)  SpO2: 98% (18 @ 04:39) (95% - 98%)  Wt(kg): --      Physical Exam:  General: well developed well nourished, in no acute distress  Eyes: sclera anicteric, pupils equal and reactive to light  ENMT: buccal mucosa moist, pharynx not injected  Neck: supple, trachea midline  Lungs: clear, no wheeze/rhonchi  Cardiovascular: regular rate and rhythm, S1 S2  Abdomen: soft, nontender, no organomegaly present, bowel sounds normal, no CVA tenderness  Neurological: alert and oriented x3, Cranial Nerves II-XII grossly intact  Skin: no increased ecchymosis/petechiae/purpura  Lymph Nodes: no palpable cervical/supraclavicular lymph nodes enlargements  Extremities: no cyanosis/clubbing/edema            LABS:                          11.5   8.1   )-----------( 351      ( 2018 06:43 )             35.0       -    140  |  105  |  9   ----------------------------<  85  4.0   |  27  |  0.64    Ca    9.0      2018 06:43          Urinalysis Basic - ( 2018 18:34 )    Color: Yellow / Appearance: Clear / S.005 / pH: x  Gluc: x / Ketone: Negative  / Bili: Negative / Urobili: Negative   Blood: x / Protein: Negative / Nitrite: Negative   Leuk Esterase: Trace / RBC: 0-2 /HPF / WBC 0-2   Sq Epi: x / Non Sq Epi: Occasional / Bacteria: Occasional              RECENT CULTURES:    Culture - Acid Fast - Urine (collected 18 @ 14:46)  Source: .Urine Urine cup    Culture - Blood (collected 18 @ 12:11)  Source: .Blood Blood  Preliminary Report (18 @ 13:01):    No growth to date.    Culture - Blood (collected 18 @ 12:11)  Source: .Blood Blood  Preliminary Report (18 @ 13:01):    No growth to date.    Culture - Urine (collected 18 @ 12:03)  Source: .Urine Clean Catch (Midstream)  Final Report (18 @ 09:23):    >100,000 CFU/ml Escherichia coli  Organism: Escherichia coli (18 @ 09:23)  Organism: Escherichia coli (18 @ 09:23)      -  Amikacin: S <=8      -  Amoxicillin/Clavulanic Acid: S <=8/4      -  Ampicillin: R >16 These ampicillin results predict results for amoxicillin      -  Ampicillin/Sulbactam: R >16/8      -  Aztreonam: S <=4      -  Cefazolin: S 8 This predicts results for oral agents cefaclor, cefdinir, cefpodoxime, cefprozil, cefuroxime axetil, cephalexin and locarbef for uncomplicated UTI. Note that some isolates may be susceptible to these agents while testing resistant to cefazolin.      -  Cefepime: S <=2      -  Cefoxitin: S <=4      -  Ceftriaxone: S <=1 Enterobacter, Citrobacter, and Serratia may develop resistance during prolonged therapy      -  Ciprofloxacin: S <=0.5      -  Ertapenem: S <=0.5      -  Gentamicin: S <=1      -  Imipenem: S <=1      -  Levofloxacin: S <=1      -  Meropenem: S <=1      -  Nitrofurantoin: S <=32 Should not be used to treat pyelonephritis      -  Piperacillin/Tazobactam: S <=8      -  Tigecycline: S <=1      -  Tobramycin: S <=2      -  Trimethoprim/Sulfamethoxazole: R >2/38      Method Type: JORGE      RADIOLOGY & ADDITIONAL STUDIES:  CT Abdomen and Pelvis w/ IV Cont (18 @ 11:41) >  COMPARISON: CT scan 4/3/2018 and2017    FINDINGS:      The liver, spleen and gallbladder appear unremarkable.    The adrenal glands and pancreas are unremarkable in appearance.    There is patchy decreased enhancement of the kidney, particularly at the   interpolar and upper pole. Finding is most compatible with   infection/pyelonephritis.  No intrarenal or perinephric fluid collection is noted.  There is a small, indeterminate  subcentimeter hypodense focus at the   midpole the left kidney, stable in appearance, likely representing a   renal cyst.  No hydronephrosis or obstructing ureteral calculus is noted.    The abdominal aorta is normal in caliber. No enlarged retroperitoneal   lymphadenopathy is noted.      Evaluation of the stomach and gastrointestinal tract is limited without   distention.  No bowel obstruction is noted.  The localized intra-abdominal fluid collection or pneumoperitoneum is   noted.    There is a normal-appearing appendix.    The urinary bladder appears unremarkable.  No free fluid is notedwithin the pelvis.    Impression:    CT findings compatible with pyelonephritis left kidney.  No drainable fluid collection noted.      Assessment :    42 year old F with hx pyelonephritis in , and 2017 readmitted with bilateral  flank pain with fever and nausea and dysuria , had recent cystoscopy 1 week ago by Dr. Judd at [facility] . She just finished a course of Bactrim   Recurrent pyelonephritis? Infected renal cyst? UA is positive and all prior urine cs have been negative , except one in september  grew Staph Saprophyticus and was treated with Augmentin.    She has no signs of obstruction or nephrolithiasis Need to  concerned about Acute interstitial cystis / nephritis and other causes  of recurrent pyelonephritis . As she is from [facility] need to rule out renal TB. Urine AFB sent , afb smear not done on urine by lab , her quantiferon TB gold test is negative     By CT scan she has acute left pyelonephritis and she has left kidney midpole cyst .  Repeat UA is negat    She may have vaginal candidiasis as a cause of vaginal itching , she feels better after dose of diflucan       Plan :   - can be dc home on po Cipro 500 mg bid x 10 days to give total 14 days of therapy   - Needs Gyn follow up as out patient   - consider obtaining pathology results fro the cystoscopy done recently  - trend CBC    Continue with present regime .  Appropriate use of antibiotics and adverse effects reviewed.    I have discussed the above plan of care with patient in detail. She  expressed understanding of the  treatment plan . Risks, benefits and alternatives discussed in detail. I have asked if she has any questions or concerns and appropriately addressed them to the best of my ability .    > 35 minutes were spent in direct patient care reviewing notes, medications ,labs data/ imaging , discussion with multidisciplinary team.    Thank you for allowing me to participate in care of your patient .    Ana Maria Fan MD  [phone]

## 2018-04-07 LAB
APPEARANCE: CLEAR
BACTERIA UR CULT: ABNORMAL
BACTERIA: ABNORMAL
BILIRUBIN URINE: NEGATIVE
BLOOD URINE: NEGATIVE
COLOR: YELLOW
CORE LAB FLUID CYTOLOGY: NORMAL
GLUCOSE QUALITATIVE U: NEGATIVE MG/DL
HYALINE CASTS: 0 /LPF
KETONES URINE: NEGATIVE
LEUKOCYTE ESTERASE URINE: ABNORMAL
MICROSCOPIC-UA: NORMAL
NITRITE URINE: NEGATIVE
PH URINE: 6.5
PROTEIN URINE: NEGATIVE MG/DL
RED BLOOD CELLS URINE: 0 /HPF
SPECIFIC GRAVITY URINE: 1
SQUAMOUS EPITHELIAL CELLS: 3 /HPF
UROBILINOGEN URINE: NEGATIVE MG/DL
WHITE BLOOD CELLS URINE: 4 /HPF

## 2018-04-11 PROBLEM — N39.0 URINARY TRACT INFECTION, SITE NOT SPECIFIED: Chronic | Status: ACTIVE | Noted: 2018-04-03

## 2018-04-12 ENCOUNTER — OUTPATIENT (OUTPATIENT)
Dept: OUTPATIENT SERVICES | Facility: HOSPITAL | Age: 43
LOS: 1 days | End: 2018-04-12
Payer: COMMERCIAL

## 2018-04-12 ENCOUNTER — APPOINTMENT (OUTPATIENT)
Dept: UROLOGY | Facility: CLINIC | Age: 43
End: 2018-04-12
Payer: COMMERCIAL

## 2018-04-12 VITALS
HEART RATE: 78 BPM | TEMPERATURE: 97.9 F | SYSTOLIC BLOOD PRESSURE: 109 MMHG | RESPIRATION RATE: 17 BRPM | DIASTOLIC BLOOD PRESSURE: 76 MMHG

## 2018-04-12 VITALS — SYSTOLIC BLOOD PRESSURE: 96 MMHG | RESPIRATION RATE: 16 BRPM | DIASTOLIC BLOOD PRESSURE: 64 MMHG | HEART RATE: 67 BPM

## 2018-04-12 DIAGNOSIS — R39.9 UNSPECIFIED SYMPTOMS AND SIGNS INVOLVING THE GENITOURINARY SYSTEM: Chronic | ICD-10-CM

## 2018-04-12 DIAGNOSIS — R35.0 FREQUENCY OF MICTURITION: ICD-10-CM

## 2018-04-12 DIAGNOSIS — Z78.9 OTHER SPECIFIED HEALTH STATUS: ICD-10-CM

## 2018-04-12 PROCEDURE — 51700 IRRIGATION OF BLADDER: CPT

## 2018-04-12 PROCEDURE — 99214 OFFICE O/P EST MOD 30 MIN: CPT | Mod: 25

## 2018-04-12 RX ORDER — NITROFURANTOIN (MONOHYDRATE/MACROCRYSTALS) 25; 75 MG/1; MG/1
100 CAPSULE ORAL
Qty: 14 | Refills: 0 | Status: COMPLETED | COMMUNITY
Start: 2017-12-26

## 2018-04-16 DIAGNOSIS — N12 TUBULO-INTERSTITIAL NEPHRITIS, NOT SPECIFIED AS ACUTE OR CHRONIC: ICD-10-CM

## 2018-04-16 DIAGNOSIS — R39.89 OTHER SYMPTOMS AND SIGNS INVOLVING THE GENITOURINARY SYSTEM: ICD-10-CM

## 2018-04-16 DIAGNOSIS — N76.0 ACUTE VAGINITIS: ICD-10-CM

## 2018-04-16 DIAGNOSIS — N39.0 URINARY TRACT INFECTION, SITE NOT SPECIFIED: ICD-10-CM

## 2018-04-19 ENCOUNTER — APPOINTMENT (OUTPATIENT)
Dept: UROLOGY | Facility: CLINIC | Age: 43
End: 2018-04-19
Payer: COMMERCIAL

## 2018-04-19 ENCOUNTER — APPOINTMENT (OUTPATIENT)
Dept: UROLOGY | Facility: CLINIC | Age: 43
End: 2018-04-19

## 2018-04-19 ENCOUNTER — OUTPATIENT (OUTPATIENT)
Dept: OUTPATIENT SERVICES | Facility: HOSPITAL | Age: 43
LOS: 1 days | End: 2018-04-19
Payer: COMMERCIAL

## 2018-04-19 VITALS — SYSTOLIC BLOOD PRESSURE: 104 MMHG | DIASTOLIC BLOOD PRESSURE: 66 MMHG | RESPIRATION RATE: 18 BRPM | HEART RATE: 82 BPM

## 2018-04-19 VITALS
TEMPERATURE: 98.2 F | SYSTOLIC BLOOD PRESSURE: 109 MMHG | HEART RATE: 83 BPM | DIASTOLIC BLOOD PRESSURE: 71 MMHG | RESPIRATION RATE: 17 BRPM

## 2018-04-19 DIAGNOSIS — R35.0 FREQUENCY OF MICTURITION: ICD-10-CM

## 2018-04-19 DIAGNOSIS — R39.9 UNSPECIFIED SYMPTOMS AND SIGNS INVOLVING THE GENITOURINARY SYSTEM: Chronic | ICD-10-CM

## 2018-04-19 LAB
APPEARANCE: CLEAR
BACTERIA UR CULT: NORMAL
BACTERIA: NEGATIVE
BILIRUBIN URINE: NEGATIVE
BLOOD URINE: ABNORMAL
COLOR: YELLOW
CORE LAB FLUID CYTOLOGY: NORMAL
GLUCOSE QUALITATIVE U: NEGATIVE MG/DL
HYALINE CASTS: 1 /LPF
KETONES URINE: NEGATIVE
LEUKOCYTE ESTERASE URINE: NEGATIVE
MICROSCOPIC-UA: NORMAL
NITRITE URINE: NEGATIVE
PH URINE: 7
PROTEIN URINE: NEGATIVE MG/DL
RED BLOOD CELLS URINE: 1 /HPF
SPECIFIC GRAVITY URINE: 1.01
SQUAMOUS EPITHELIAL CELLS: 2 /HPF
UROBILINOGEN URINE: NEGATIVE MG/DL
WHITE BLOOD CELLS URINE: 1 /HPF

## 2018-04-19 PROCEDURE — 51700 IRRIGATION OF BLADDER: CPT

## 2018-04-23 DIAGNOSIS — N39.0 URINARY TRACT INFECTION, SITE NOT SPECIFIED: ICD-10-CM

## 2018-04-23 DIAGNOSIS — R39.89 OTHER SYMPTOMS AND SIGNS INVOLVING THE GENITOURINARY SYSTEM: ICD-10-CM

## 2018-04-26 ENCOUNTER — APPOINTMENT (OUTPATIENT)
Dept: UROLOGY | Facility: CLINIC | Age: 43
End: 2018-04-26
Payer: COMMERCIAL

## 2018-04-26 ENCOUNTER — OUTPATIENT (OUTPATIENT)
Dept: OUTPATIENT SERVICES | Facility: HOSPITAL | Age: 43
LOS: 1 days | End: 2018-04-26
Payer: COMMERCIAL

## 2018-04-26 ENCOUNTER — MEDICATION RENEWAL (OUTPATIENT)
Age: 43
End: 2018-04-26

## 2018-04-26 VITALS
SYSTOLIC BLOOD PRESSURE: 105 MMHG | TEMPERATURE: 98.6 F | DIASTOLIC BLOOD PRESSURE: 70 MMHG | HEART RATE: 73 BPM | RESPIRATION RATE: 17 BRPM

## 2018-04-26 VITALS — RESPIRATION RATE: 17 BRPM | HEART RATE: 73 BPM | SYSTOLIC BLOOD PRESSURE: 106 MMHG | DIASTOLIC BLOOD PRESSURE: 72 MMHG

## 2018-04-26 DIAGNOSIS — R39.9 UNSPECIFIED SYMPTOMS AND SIGNS INVOLVING THE GENITOURINARY SYSTEM: Chronic | ICD-10-CM

## 2018-04-26 DIAGNOSIS — R35.0 FREQUENCY OF MICTURITION: ICD-10-CM

## 2018-04-26 LAB
APPEARANCE: CLEAR
BACTERIA: NEGATIVE
BILIRUBIN URINE: NEGATIVE
BLOOD URINE: NEGATIVE
COLOR: YELLOW
GLUCOSE QUALITATIVE U: NEGATIVE MG/DL
HYALINE CASTS: 2 /LPF
KETONES URINE: NEGATIVE
LEUKOCYTE ESTERASE URINE: NEGATIVE
MICROSCOPIC-UA: NORMAL
NITRITE URINE: NEGATIVE
PH URINE: 7
PROTEIN URINE: NEGATIVE MG/DL
RED BLOOD CELLS URINE: 1 /HPF
SPECIFIC GRAVITY URINE: 1.01
SQUAMOUS EPITHELIAL CELLS: 1 /HPF
UROBILINOGEN URINE: NEGATIVE MG/DL
WHITE BLOOD CELLS URINE: 0 /HPF

## 2018-04-26 PROCEDURE — 99213 OFFICE O/P EST LOW 20 MIN: CPT | Mod: 25

## 2018-04-26 PROCEDURE — 51700 IRRIGATION OF BLADDER: CPT

## 2018-04-27 DIAGNOSIS — R39.89 OTHER SYMPTOMS AND SIGNS INVOLVING THE GENITOURINARY SYSTEM: ICD-10-CM

## 2018-04-27 DIAGNOSIS — N39.0 URINARY TRACT INFECTION, SITE NOT SPECIFIED: ICD-10-CM

## 2018-04-28 LAB
BACTERIA UR CULT: NORMAL
CORE LAB FLUID CYTOLOGY: NORMAL

## 2018-04-30 NOTE — MEDICAL STUDENT ADULT H&P (EDUCATION) - NS MD HP STUD PE NEURO FT
Pt being prepared for discharge.  Requests white soda to drink, states she is so thirsty.  White soda 7.5ml can given to pt per request.   A&Ox3, nonfocal, sensation intact

## 2018-05-07 ENCOUNTER — APPOINTMENT (OUTPATIENT)
Dept: UROLOGY | Facility: CLINIC | Age: 43
End: 2018-05-07
Payer: COMMERCIAL

## 2018-05-07 ENCOUNTER — OUTPATIENT (OUTPATIENT)
Dept: OUTPATIENT SERVICES | Facility: HOSPITAL | Age: 43
LOS: 1 days | End: 2018-05-07
Payer: COMMERCIAL

## 2018-05-07 VITALS
DIASTOLIC BLOOD PRESSURE: 73 MMHG | TEMPERATURE: 98.2 F | RESPIRATION RATE: 17 BRPM | SYSTOLIC BLOOD PRESSURE: 113 MMHG | HEART RATE: 71 BPM

## 2018-05-07 VITALS — RESPIRATION RATE: 17 BRPM | DIASTOLIC BLOOD PRESSURE: 76 MMHG | SYSTOLIC BLOOD PRESSURE: 122 MMHG | HEART RATE: 68 BPM

## 2018-05-07 DIAGNOSIS — R35.0 FREQUENCY OF MICTURITION: ICD-10-CM

## 2018-05-07 DIAGNOSIS — R39.9 UNSPECIFIED SYMPTOMS AND SIGNS INVOLVING THE GENITOURINARY SYSTEM: Chronic | ICD-10-CM

## 2018-05-07 PROCEDURE — 51700 IRRIGATION OF BLADDER: CPT

## 2018-05-09 DIAGNOSIS — N12 TUBULO-INTERSTITIAL NEPHRITIS, NOT SPECIFIED AS ACUTE OR CHRONIC: ICD-10-CM

## 2018-05-09 DIAGNOSIS — N39.0 URINARY TRACT INFECTION, SITE NOT SPECIFIED: ICD-10-CM

## 2018-05-09 DIAGNOSIS — R39.89 OTHER SYMPTOMS AND SIGNS INVOLVING THE GENITOURINARY SYSTEM: ICD-10-CM

## 2018-05-15 ENCOUNTER — OUTPATIENT (OUTPATIENT)
Dept: OUTPATIENT SERVICES | Facility: HOSPITAL | Age: 43
LOS: 1 days | End: 2018-05-15
Payer: COMMERCIAL

## 2018-05-15 ENCOUNTER — APPOINTMENT (OUTPATIENT)
Dept: UROLOGY | Facility: CLINIC | Age: 43
End: 2018-05-15
Payer: COMMERCIAL

## 2018-05-15 VITALS
DIASTOLIC BLOOD PRESSURE: 77 MMHG | RESPIRATION RATE: 17 BRPM | TEMPERATURE: 97.4 F | HEART RATE: 76 BPM | SYSTOLIC BLOOD PRESSURE: 110 MMHG

## 2018-05-15 VITALS — RESPIRATION RATE: 17 BRPM | DIASTOLIC BLOOD PRESSURE: 72 MMHG | SYSTOLIC BLOOD PRESSURE: 104 MMHG | HEART RATE: 79 BPM

## 2018-05-15 DIAGNOSIS — R35.0 FREQUENCY OF MICTURITION: ICD-10-CM

## 2018-05-15 DIAGNOSIS — R39.9 UNSPECIFIED SYMPTOMS AND SIGNS INVOLVING THE GENITOURINARY SYSTEM: Chronic | ICD-10-CM

## 2018-05-15 PROCEDURE — 51700 IRRIGATION OF BLADDER: CPT

## 2018-05-17 DIAGNOSIS — R39.89 OTHER SYMPTOMS AND SIGNS INVOLVING THE GENITOURINARY SYSTEM: ICD-10-CM

## 2018-05-17 DIAGNOSIS — N39.0 URINARY TRACT INFECTION, SITE NOT SPECIFIED: ICD-10-CM

## 2018-05-17 DIAGNOSIS — N12 TUBULO-INTERSTITIAL NEPHRITIS, NOT SPECIFIED AS ACUTE OR CHRONIC: ICD-10-CM

## 2018-05-23 LAB
CULTURE RESULTS: SIGNIFICANT CHANGE UP
SPECIMEN SOURCE: SIGNIFICANT CHANGE UP

## 2018-05-24 ENCOUNTER — APPOINTMENT (OUTPATIENT)
Dept: UROLOGY | Facility: CLINIC | Age: 43
End: 2018-05-24
Payer: COMMERCIAL

## 2018-05-24 ENCOUNTER — OUTPATIENT (OUTPATIENT)
Dept: OUTPATIENT SERVICES | Facility: HOSPITAL | Age: 43
LOS: 1 days | End: 2018-05-24
Payer: COMMERCIAL

## 2018-05-24 VITALS
DIASTOLIC BLOOD PRESSURE: 71 MMHG | RESPIRATION RATE: 17 BRPM | HEART RATE: 80 BPM | SYSTOLIC BLOOD PRESSURE: 105 MMHG | TEMPERATURE: 97.8 F

## 2018-05-24 VITALS — SYSTOLIC BLOOD PRESSURE: 103 MMHG | DIASTOLIC BLOOD PRESSURE: 76 MMHG

## 2018-05-24 DIAGNOSIS — R39.9 UNSPECIFIED SYMPTOMS AND SIGNS INVOLVING THE GENITOURINARY SYSTEM: Chronic | ICD-10-CM

## 2018-05-24 DIAGNOSIS — R35.0 FREQUENCY OF MICTURITION: ICD-10-CM

## 2018-05-24 PROCEDURE — 51700 IRRIGATION OF BLADDER: CPT

## 2018-05-30 DIAGNOSIS — N12 TUBULO-INTERSTITIAL NEPHRITIS, NOT SPECIFIED AS ACUTE OR CHRONIC: ICD-10-CM

## 2018-05-30 DIAGNOSIS — R39.89 OTHER SYMPTOMS AND SIGNS INVOLVING THE GENITOURINARY SYSTEM: ICD-10-CM

## 2018-05-30 DIAGNOSIS — N39.0 URINARY TRACT INFECTION, SITE NOT SPECIFIED: ICD-10-CM

## 2018-06-04 ENCOUNTER — APPOINTMENT (OUTPATIENT)
Dept: UROLOGY | Facility: CLINIC | Age: 43
End: 2018-06-04
Payer: COMMERCIAL

## 2018-06-04 ENCOUNTER — OUTPATIENT (OUTPATIENT)
Dept: OUTPATIENT SERVICES | Facility: HOSPITAL | Age: 43
LOS: 1 days | End: 2018-06-04
Payer: COMMERCIAL

## 2018-06-04 VITALS
HEART RATE: 77 BPM | DIASTOLIC BLOOD PRESSURE: 72 MMHG | TEMPERATURE: 98.7 F | RESPIRATION RATE: 17 BRPM | SYSTOLIC BLOOD PRESSURE: 114 MMHG

## 2018-06-04 VITALS — SYSTOLIC BLOOD PRESSURE: 119 MMHG | RESPIRATION RATE: 16 BRPM | DIASTOLIC BLOOD PRESSURE: 83 MMHG | HEART RATE: 65 BPM

## 2018-06-04 DIAGNOSIS — R39.9 UNSPECIFIED SYMPTOMS AND SIGNS INVOLVING THE GENITOURINARY SYSTEM: Chronic | ICD-10-CM

## 2018-06-04 DIAGNOSIS — R35.0 FREQUENCY OF MICTURITION: ICD-10-CM

## 2018-06-04 PROCEDURE — 51700 IRRIGATION OF BLADDER: CPT

## 2018-06-05 DIAGNOSIS — R39.89 OTHER SYMPTOMS AND SIGNS INVOLVING THE GENITOURINARY SYSTEM: ICD-10-CM

## 2018-06-05 DIAGNOSIS — N39.0 URINARY TRACT INFECTION, SITE NOT SPECIFIED: ICD-10-CM

## 2018-06-26 ENCOUNTER — APPOINTMENT (OUTPATIENT)
Dept: UROLOGY | Facility: CLINIC | Age: 43
End: 2018-06-26

## 2018-07-03 ENCOUNTER — APPOINTMENT (OUTPATIENT)
Dept: UROLOGY | Facility: CLINIC | Age: 43
End: 2018-07-03
Payer: COMMERCIAL

## 2018-07-03 ENCOUNTER — OUTPATIENT (OUTPATIENT)
Dept: OUTPATIENT SERVICES | Facility: HOSPITAL | Age: 43
LOS: 1 days | End: 2018-07-03
Payer: COMMERCIAL

## 2018-07-03 VITALS
TEMPERATURE: 99.1 F | HEART RATE: 88 BPM | DIASTOLIC BLOOD PRESSURE: 71 MMHG | SYSTOLIC BLOOD PRESSURE: 110 MMHG | RESPIRATION RATE: 17 BRPM

## 2018-07-03 VITALS — SYSTOLIC BLOOD PRESSURE: 107 MMHG | HEART RATE: 83 BPM | RESPIRATION RATE: 16 BRPM | DIASTOLIC BLOOD PRESSURE: 72 MMHG

## 2018-07-03 DIAGNOSIS — Z87.42 PERSONAL HISTORY OF OTHER DISEASES OF THE FEMALE GENITAL TRACT: ICD-10-CM

## 2018-07-03 DIAGNOSIS — R39.9 UNSPECIFIED SYMPTOMS AND SIGNS INVOLVING THE GENITOURINARY SYSTEM: Chronic | ICD-10-CM

## 2018-07-03 DIAGNOSIS — R35.0 FREQUENCY OF MICTURITION: ICD-10-CM

## 2018-07-03 PROCEDURE — 99213 OFFICE O/P EST LOW 20 MIN: CPT | Mod: 25

## 2018-07-03 PROCEDURE — 51700 IRRIGATION OF BLADDER: CPT

## 2018-07-04 LAB
APPEARANCE: CLEAR
BACTERIA: NEGATIVE
BILIRUBIN URINE: NEGATIVE
BLOOD URINE: NEGATIVE
COLOR: YELLOW
GLUCOSE QUALITATIVE U: NEGATIVE MG/DL
HYALINE CASTS: 0 /LPF
KETONES URINE: NEGATIVE
LEUKOCYTE ESTERASE URINE: NEGATIVE
MICROSCOPIC-UA: NORMAL
NITRITE URINE: NEGATIVE
PH URINE: 7.5
PROTEIN URINE: NEGATIVE MG/DL
RED BLOOD CELLS URINE: 1 /HPF
SPECIFIC GRAVITY URINE: 1.01
SQUAMOUS EPITHELIAL CELLS: 0 /HPF
UROBILINOGEN URINE: NEGATIVE MG/DL
WHITE BLOOD CELLS URINE: 0 /HPF

## 2018-07-06 LAB — BACTERIA UR CULT: NORMAL

## 2018-07-09 DIAGNOSIS — N39.0 URINARY TRACT INFECTION, SITE NOT SPECIFIED: ICD-10-CM

## 2018-07-09 DIAGNOSIS — R39.89 OTHER SYMPTOMS AND SIGNS INVOLVING THE GENITOURINARY SYSTEM: ICD-10-CM

## 2018-07-09 DIAGNOSIS — N76.0 ACUTE VAGINITIS: ICD-10-CM

## 2018-07-16 ENCOUNTER — APPOINTMENT (OUTPATIENT)
Dept: UROLOGY | Facility: CLINIC | Age: 43
End: 2018-07-16
Payer: COMMERCIAL

## 2018-07-16 ENCOUNTER — OUTPATIENT (OUTPATIENT)
Dept: OUTPATIENT SERVICES | Facility: HOSPITAL | Age: 43
LOS: 1 days | End: 2018-07-16
Payer: COMMERCIAL

## 2018-07-16 VITALS
HEART RATE: 83 BPM | SYSTOLIC BLOOD PRESSURE: 116 MMHG | DIASTOLIC BLOOD PRESSURE: 75 MMHG | RESPIRATION RATE: 16 BRPM | TEMPERATURE: 98.8 F

## 2018-07-16 VITALS
HEART RATE: 80 BPM | SYSTOLIC BLOOD PRESSURE: 109 MMHG | TEMPERATURE: 98.7 F | RESPIRATION RATE: 16 BRPM | DIASTOLIC BLOOD PRESSURE: 70 MMHG

## 2018-07-16 DIAGNOSIS — R53.81 OTHER MALAISE: ICD-10-CM

## 2018-07-16 DIAGNOSIS — R39.9 UNSPECIFIED SYMPTOMS AND SIGNS INVOLVING THE GENITOURINARY SYSTEM: Chronic | ICD-10-CM

## 2018-07-16 DIAGNOSIS — R35.0 FREQUENCY OF MICTURITION: ICD-10-CM

## 2018-07-16 LAB
ALBUMIN SERPL ELPH-MCNC: 4.4 G/DL
ALP BLD-CCNC: 57 U/L
ALT SERPL-CCNC: 15 U/L
ANION GAP SERPL CALC-SCNC: 16 MMOL/L
AST SERPL-CCNC: 20 U/L
BASOPHILS # BLD AUTO: 0.02 K/UL
BASOPHILS NFR BLD AUTO: 0.2 %
BILIRUB SERPL-MCNC: 0.3 MG/DL
BUN SERPL-MCNC: 9 MG/DL
CALCIUM SERPL-MCNC: 9.2 MG/DL
CHLORIDE SERPL-SCNC: 103 MMOL/L
CO2 SERPL-SCNC: 21 MMOL/L
CREAT SERPL-MCNC: 0.76 MG/DL
EOSINOPHIL # BLD AUTO: 0.04 K/UL
EOSINOPHIL NFR BLD AUTO: 0.4 %
GLUCOSE SERPL-MCNC: 101 MG/DL
HCT VFR BLD CALC: 39.4 %
HGB BLD-MCNC: 12.6 G/DL
IMM GRANULOCYTES NFR BLD AUTO: 0.2 %
LYMPHOCYTES # BLD AUTO: 2.1 K/UL
LYMPHOCYTES NFR BLD AUTO: 23.1 %
MAN DIFF?: NORMAL
MCHC RBC-ENTMCNC: 27.7 PG
MCHC RBC-ENTMCNC: 32 GM/DL
MCV RBC AUTO: 86.6 FL
MONOCYTES # BLD AUTO: 0.54 K/UL
MONOCYTES NFR BLD AUTO: 5.9 %
NEUTROPHILS # BLD AUTO: 6.39 K/UL
NEUTROPHILS NFR BLD AUTO: 70.2 %
PLATELET # BLD AUTO: 313 K/UL
POTASSIUM SERPL-SCNC: 4.1 MMOL/L
PROT SERPL-MCNC: 7.1 G/DL
RBC # BLD: 4.55 M/UL
RBC # FLD: 13.9 %
SODIUM SERPL-SCNC: 140 MMOL/L
WBC # FLD AUTO: 9.11 K/UL

## 2018-07-16 PROCEDURE — 99214 OFFICE O/P EST MOD 30 MIN: CPT | Mod: 25

## 2018-07-16 PROCEDURE — 51700 IRRIGATION OF BLADDER: CPT

## 2018-07-21 DIAGNOSIS — N39.0 URINARY TRACT INFECTION, SITE NOT SPECIFIED: ICD-10-CM

## 2018-07-21 DIAGNOSIS — R39.89 OTHER SYMPTOMS AND SIGNS INVOLVING THE GENITOURINARY SYSTEM: ICD-10-CM

## 2018-07-21 DIAGNOSIS — R68.83 CHILLS (WITHOUT FEVER): ICD-10-CM

## 2018-07-21 DIAGNOSIS — R53.81 OTHER MALAISE: ICD-10-CM

## 2018-07-22 LAB
APPEARANCE: CLEAR
BACTERIA UR CULT: NORMAL
BACTERIA: NEGATIVE
BILIRUBIN URINE: NEGATIVE
BLOOD URINE: NEGATIVE
COLOR: YELLOW
CORE LAB FLUID CYTOLOGY: NORMAL
GLUCOSE QUALITATIVE U: NEGATIVE MG/DL
HYALINE CASTS: 2 /LPF
KETONES URINE: NEGATIVE
LEUKOCYTE ESTERASE URINE: NEGATIVE
MICROSCOPIC-UA: NORMAL
NITRITE URINE: NEGATIVE
PH URINE: 8
PROTEIN URINE: NEGATIVE MG/DL
RED BLOOD CELLS URINE: 5 /HPF
SPECIFIC GRAVITY URINE: 1.01
SQUAMOUS EPITHELIAL CELLS: 3 /HPF
UROBILINOGEN URINE: NEGATIVE MG/DL
WHITE BLOOD CELLS URINE: 0 /HPF

## 2018-07-23 ENCOUNTER — APPOINTMENT (OUTPATIENT)
Dept: UROLOGY | Facility: CLINIC | Age: 43
End: 2018-07-23
Payer: COMMERCIAL

## 2018-07-23 ENCOUNTER — OUTPATIENT (OUTPATIENT)
Dept: OUTPATIENT SERVICES | Facility: HOSPITAL | Age: 43
LOS: 1 days | End: 2018-07-23
Payer: COMMERCIAL

## 2018-07-23 ENCOUNTER — APPOINTMENT (OUTPATIENT)
Dept: UROLOGY | Facility: CLINIC | Age: 43
End: 2018-07-23

## 2018-07-23 VITALS
RESPIRATION RATE: 16 BRPM | TEMPERATURE: 98.6 F | SYSTOLIC BLOOD PRESSURE: 111 MMHG | HEART RATE: 74 BPM | DIASTOLIC BLOOD PRESSURE: 74 MMHG

## 2018-07-23 VITALS — RESPIRATION RATE: 16 BRPM | DIASTOLIC BLOOD PRESSURE: 78 MMHG | SYSTOLIC BLOOD PRESSURE: 118 MMHG | HEART RATE: 75 BPM

## 2018-07-23 DIAGNOSIS — R39.9 UNSPECIFIED SYMPTOMS AND SIGNS INVOLVING THE GENITOURINARY SYSTEM: Chronic | ICD-10-CM

## 2018-07-23 DIAGNOSIS — R35.0 FREQUENCY OF MICTURITION: ICD-10-CM

## 2018-07-23 PROCEDURE — 51700 IRRIGATION OF BLADDER: CPT

## 2018-07-23 PROCEDURE — 99214 OFFICE O/P EST MOD 30 MIN: CPT | Mod: 25

## 2018-07-26 DIAGNOSIS — N12 TUBULO-INTERSTITIAL NEPHRITIS, NOT SPECIFIED AS ACUTE OR CHRONIC: ICD-10-CM

## 2018-07-26 DIAGNOSIS — N39.0 URINARY TRACT INFECTION, SITE NOT SPECIFIED: ICD-10-CM

## 2018-07-26 DIAGNOSIS — R39.89 OTHER SYMPTOMS AND SIGNS INVOLVING THE GENITOURINARY SYSTEM: ICD-10-CM

## 2018-07-26 DIAGNOSIS — R31.29 OTHER MICROSCOPIC HEMATURIA: ICD-10-CM

## 2018-07-28 LAB
APPEARANCE: CLEAR
BACTERIA UR CULT: NORMAL
BACTERIA: NEGATIVE
BILIRUBIN URINE: NEGATIVE
BLOOD URINE: NEGATIVE
COLOR: YELLOW
CORE LAB FLUID CYTOLOGY: NORMAL
GLUCOSE QUALITATIVE U: NEGATIVE MG/DL
KETONES URINE: NEGATIVE
LEUKOCYTE ESTERASE URINE: NEGATIVE
MICROSCOPIC-UA: NORMAL
NITRITE URINE: NEGATIVE
PH URINE: 6.5
PROTEIN URINE: NEGATIVE MG/DL
RED BLOOD CELLS URINE: 3 /HPF
SPECIFIC GRAVITY URINE: 1.01
SQUAMOUS EPITHELIAL CELLS: 0 /HPF
UROBILINOGEN URINE: NEGATIVE MG/DL
WHITE BLOOD CELLS URINE: 0 /HPF

## 2018-08-06 ENCOUNTER — APPOINTMENT (OUTPATIENT)
Dept: UROLOGY | Facility: CLINIC | Age: 43
End: 2018-08-06
Payer: COMMERCIAL

## 2018-08-06 ENCOUNTER — OUTPATIENT (OUTPATIENT)
Dept: OUTPATIENT SERVICES | Facility: HOSPITAL | Age: 43
LOS: 1 days | End: 2018-08-06
Payer: COMMERCIAL

## 2018-08-06 VITALS
HEART RATE: 76 BPM | SYSTOLIC BLOOD PRESSURE: 113 MMHG | DIASTOLIC BLOOD PRESSURE: 74 MMHG | RESPIRATION RATE: 16 BRPM | TEMPERATURE: 98.8 F

## 2018-08-06 DIAGNOSIS — R39.9 UNSPECIFIED SYMPTOMS AND SIGNS INVOLVING THE GENITOURINARY SYSTEM: Chronic | ICD-10-CM

## 2018-08-06 PROCEDURE — 52000 CYSTOURETHROSCOPY: CPT

## 2018-08-06 PROCEDURE — 99214 OFFICE O/P EST MOD 30 MIN: CPT | Mod: 25

## 2018-08-06 PROCEDURE — 51700 IRRIGATION OF BLADDER: CPT | Mod: 59

## 2018-08-07 ENCOUNTER — TRANSCRIPTION ENCOUNTER (OUTPATIENT)
Age: 43
End: 2018-08-07

## 2018-08-08 ENCOUNTER — OUTPATIENT (OUTPATIENT)
Dept: OUTPATIENT SERVICES | Facility: HOSPITAL | Age: 43
LOS: 1 days | End: 2018-08-08
Payer: COMMERCIAL

## 2018-08-08 ENCOUNTER — TRANSCRIPTION ENCOUNTER (OUTPATIENT)
Age: 43
End: 2018-08-08

## 2018-08-08 ENCOUNTER — RESULT REVIEW (OUTPATIENT)
Age: 43
End: 2018-08-08

## 2018-08-08 DIAGNOSIS — R39.9 UNSPECIFIED SYMPTOMS AND SIGNS INVOLVING THE GENITOURINARY SYSTEM: Chronic | ICD-10-CM

## 2018-08-08 PROCEDURE — 93010 ELECTROCARDIOGRAM REPORT: CPT | Mod: 76

## 2018-08-08 PROCEDURE — 99285 EMERGENCY DEPT VISIT HI MDM: CPT | Mod: 25

## 2018-08-08 PROCEDURE — 93005 ELECTROCARDIOGRAM TRACING: CPT

## 2018-08-08 PROCEDURE — 80053 COMPREHEN METABOLIC PANEL: CPT

## 2018-08-08 PROCEDURE — 85027 COMPLETE CBC AUTOMATED: CPT

## 2018-08-08 PROCEDURE — 80048 BASIC METABOLIC PNL TOTAL CA: CPT

## 2018-08-08 PROCEDURE — 83690 ASSAY OF LIPASE: CPT

## 2018-08-08 PROCEDURE — 43239 EGD BIOPSY SINGLE/MULTIPLE: CPT

## 2018-08-08 PROCEDURE — 84702 CHORIONIC GONADOTROPIN TEST: CPT

## 2018-08-08 PROCEDURE — 70360 X-RAY EXAM OF NECK: CPT

## 2018-08-08 PROCEDURE — 81003 URINALYSIS AUTO W/O SCOPE: CPT

## 2018-08-08 PROCEDURE — 71250 CT THORAX DX C-: CPT

## 2018-08-08 PROCEDURE — 70490 CT SOFT TISSUE NECK W/O DYE: CPT

## 2018-08-09 DIAGNOSIS — R31.29 OTHER MICROSCOPIC HEMATURIA: ICD-10-CM

## 2018-08-09 DIAGNOSIS — R39.89 OTHER SYMPTOMS AND SIGNS INVOLVING THE GENITOURINARY SYSTEM: ICD-10-CM

## 2018-08-09 DIAGNOSIS — N39.0 URINARY TRACT INFECTION, SITE NOT SPECIFIED: ICD-10-CM

## 2018-08-13 ENCOUNTER — OUTPATIENT (OUTPATIENT)
Dept: OUTPATIENT SERVICES | Facility: HOSPITAL | Age: 43
LOS: 1 days | End: 2018-08-13
Payer: COMMERCIAL

## 2018-08-13 ENCOUNTER — APPOINTMENT (OUTPATIENT)
Dept: UROLOGY | Facility: CLINIC | Age: 43
End: 2018-08-13
Payer: COMMERCIAL

## 2018-08-13 VITALS — DIASTOLIC BLOOD PRESSURE: 75 MMHG | SYSTOLIC BLOOD PRESSURE: 114 MMHG | HEART RATE: 86 BPM | TEMPERATURE: 99.2 F

## 2018-08-13 DIAGNOSIS — Z98.891 HISTORY OF UTERINE SCAR FROM PREVIOUS SURGERY: Chronic | ICD-10-CM

## 2018-08-13 DIAGNOSIS — R68.83 CHILLS (WITHOUT FEVER): ICD-10-CM

## 2018-08-13 DIAGNOSIS — T17.208A UNSPECIFIED FOREIGN BODY IN PHARYNX CAUSING OTHER INJURY, INITIAL ENCOUNTER: ICD-10-CM

## 2018-08-13 DIAGNOSIS — R39.9 UNSPECIFIED SYMPTOMS AND SIGNS INVOLVING THE GENITOURINARY SYSTEM: Chronic | ICD-10-CM

## 2018-08-13 DIAGNOSIS — R35.0 FREQUENCY OF MICTURITION: ICD-10-CM

## 2018-08-13 LAB
APPEARANCE: CLEAR
BACTERIA UR CULT: NORMAL
BACTERIA: NEGATIVE
BILIRUBIN URINE: NEGATIVE
BLOOD URINE: NEGATIVE
COLOR: YELLOW
CORE LAB FLUID CYTOLOGY: NORMAL
GLUCOSE QUALITATIVE U: NEGATIVE MG/DL
HYALINE CASTS: 1 /LPF
KETONES URINE: NEGATIVE
LEUKOCYTE ESTERASE URINE: NEGATIVE
MICROSCOPIC-UA: NORMAL
NITRITE URINE: NEGATIVE
PH URINE: 7
PROTEIN URINE: NEGATIVE MG/DL
RED BLOOD CELLS URINE: 1 /HPF
SPECIFIC GRAVITY URINE: 1
SQUAMOUS EPITHELIAL CELLS: 1 /HPF
UROBILINOGEN URINE: NEGATIVE MG/DL
WHITE BLOOD CELLS URINE: 0 /HPF

## 2018-08-13 PROCEDURE — 99214 OFFICE O/P EST MOD 30 MIN: CPT | Mod: 25

## 2018-08-13 PROCEDURE — 51700 IRRIGATION OF BLADDER: CPT

## 2018-08-13 RX ORDER — CEFADROXIL 500 MG/1
500 CAPSULE ORAL
Qty: 28 | Refills: 0 | Status: COMPLETED | COMMUNITY
Start: 2018-04-07 | End: 2018-08-13

## 2018-08-13 RX ORDER — SULFAMETHOXAZOLE AND TRIMETHOPRIM 800; 160 MG/1; MG/1
800-160 TABLET ORAL
Qty: 28 | Refills: 0 | Status: COMPLETED | COMMUNITY
Start: 2018-03-13 | End: 2018-08-13

## 2018-08-15 DIAGNOSIS — R31.29 OTHER MICROSCOPIC HEMATURIA: ICD-10-CM

## 2018-08-15 DIAGNOSIS — R68.83 CHILLS (WITHOUT FEVER): ICD-10-CM

## 2018-08-15 DIAGNOSIS — N39.0 URINARY TRACT INFECTION, SITE NOT SPECIFIED: ICD-10-CM

## 2018-08-15 DIAGNOSIS — R39.89 OTHER SYMPTOMS AND SIGNS INVOLVING THE GENITOURINARY SYSTEM: ICD-10-CM

## 2018-08-15 LAB
APPEARANCE: CLEAR
BACTERIA UR CULT: NORMAL
BACTERIA: NEGATIVE
BILIRUBIN URINE: NEGATIVE
BLOOD URINE: NEGATIVE
COLOR: YELLOW
CORE LAB FLUID CYTOLOGY: NORMAL
GLUCOSE QUALITATIVE U: NEGATIVE MG/DL
KETONES URINE: NEGATIVE
LEUKOCYTE ESTERASE URINE: NEGATIVE
MICROSCOPIC-UA: NORMAL
NITRITE URINE: NEGATIVE
PH URINE: 7
PROTEIN URINE: NEGATIVE MG/DL
RED BLOOD CELLS URINE: 0 /HPF
SPECIFIC GRAVITY URINE: 1.01
SQUAMOUS EPITHELIAL CELLS: 0 /HPF
UROBILINOGEN URINE: NEGATIVE MG/DL
WHITE BLOOD CELLS URINE: 0 /HPF

## 2018-08-28 NOTE — PROGRESS NOTE ADULT - PROBLEM SELECTOR PLAN 1
Antibiotics tailored by ID Dr Fan.  Day 1 Ertapenem  FU UCx  Called Dr Chato Judd MD PhD Urology. he is away from office but staff faxed cysto report (now placed in pts paper chart)  Previously seen by Dr Garcia, but as she did not follow up with him, he reccomended calling the on-call urologist Dr Rosenberg.   When I called Dr Treviño office, he is away until 4/9/18 with Dr Garcia covering.    For now, cont ABX per ID and contemplate calling Dr Del Real, urology chair for input. <<-----Click on this checkbox to enter Procedure Bilateral knee replacement  08/28/2018    Active  NAHID Antibiotics tailored by ID Dr Fan.  Day # 2 Ertapenem after one dose of Rocephin and Meropenem.   FU repeat UCx. Education provided about hygiene.  Called Dr Chato Judd MD PhD Urology. he is away from office but staff faxed cysto report (now placed in pts paper chart)  Previously seen by Dr Garcia, but as she did not follow up with him, he reccomended calling the on-call urologist Dr Rosenberg.   When I called Dr Treviño office, he is away until 4/9/18 with Dr Garcia covering.    For now, cont ABX per ID and contemplate calling Dr Del Real, urology chair for input.

## 2018-08-29 ENCOUNTER — APPOINTMENT (OUTPATIENT)
Dept: UROLOGY | Facility: CLINIC | Age: 43
End: 2018-08-29
Payer: COMMERCIAL

## 2018-08-29 PROCEDURE — 99214 OFFICE O/P EST MOD 30 MIN: CPT

## 2018-08-31 LAB
APPEARANCE: CLEAR
BACTERIA UR CULT: NORMAL
BACTERIA: NEGATIVE
BILIRUBIN URINE: NEGATIVE
BLOOD URINE: NEGATIVE
COLOR: YELLOW
CORE LAB FLUID CYTOLOGY: NORMAL
GLUCOSE QUALITATIVE U: NEGATIVE MG/DL
HYALINE CASTS: 1 /LPF
KETONES URINE: NEGATIVE
LEUKOCYTE ESTERASE URINE: NEGATIVE
MICROSCOPIC-UA: NORMAL
NITRITE URINE: NEGATIVE
PH URINE: 7.5
PROTEIN URINE: NEGATIVE MG/DL
RED BLOOD CELLS URINE: 0 /HPF
SPECIFIC GRAVITY URINE: 1.01
SQUAMOUS EPITHELIAL CELLS: 2 /HPF
UROBILINOGEN URINE: NEGATIVE MG/DL
WHITE BLOOD CELLS URINE: 0 /HPF

## 2018-09-17 ENCOUNTER — APPOINTMENT (OUTPATIENT)
Dept: UROLOGY | Facility: CLINIC | Age: 43
End: 2018-09-17
Payer: COMMERCIAL

## 2018-09-17 PROCEDURE — 99214 OFFICE O/P EST MOD 30 MIN: CPT

## 2018-09-18 LAB
APPEARANCE: CLEAR
BACTERIA: NEGATIVE
BILIRUBIN URINE: NEGATIVE
BLOOD URINE: NEGATIVE
COLOR: YELLOW
GLUCOSE QUALITATIVE U: NEGATIVE MG/DL
HYALINE CASTS: 0 /LPF
KETONES URINE: NEGATIVE
LEUKOCYTE ESTERASE URINE: NEGATIVE
MICROSCOPIC-UA: NORMAL
NITRITE URINE: NEGATIVE
PH URINE: 7.5
PROTEIN URINE: NEGATIVE MG/DL
RED BLOOD CELLS URINE: 2 /HPF
SPECIFIC GRAVITY URINE: 1.01
SQUAMOUS EPITHELIAL CELLS: 3 /HPF
UROBILINOGEN URINE: NEGATIVE MG/DL
WHITE BLOOD CELLS URINE: 0 /HPF

## 2018-09-20 LAB
BACTERIA UR CULT: NORMAL
CORE LAB FLUID CYTOLOGY: NORMAL

## 2018-10-03 ENCOUNTER — APPOINTMENT (OUTPATIENT)
Dept: UROLOGY | Facility: CLINIC | Age: 43
End: 2018-10-03

## 2018-10-08 ENCOUNTER — APPOINTMENT (OUTPATIENT)
Dept: UROLOGY | Facility: CLINIC | Age: 43
End: 2018-10-08
Payer: COMMERCIAL

## 2018-10-08 PROCEDURE — 99214 OFFICE O/P EST MOD 30 MIN: CPT

## 2018-10-09 LAB
APPEARANCE: CLEAR
BACTERIA: NEGATIVE
BILIRUBIN URINE: NEGATIVE
BLOOD URINE: NEGATIVE
COLOR: YELLOW
CORE LAB FLUID CYTOLOGY: NORMAL
GLUCOSE QUALITATIVE U: NEGATIVE MG/DL
KETONES URINE: NEGATIVE
LEUKOCYTE ESTERASE URINE: NEGATIVE
MICROSCOPIC-UA: NORMAL
NITRITE URINE: NEGATIVE
PH URINE: 7
PROTEIN URINE: NEGATIVE MG/DL
RED BLOOD CELLS URINE: 0 /HPF
SPECIFIC GRAVITY URINE: 1
SQUAMOUS EPITHELIAL CELLS: 1 /HPF
UROBILINOGEN URINE: NEGATIVE MG/DL
WHITE BLOOD CELLS URINE: 0 /HPF

## 2018-10-10 LAB — BACTERIA UR CULT: NORMAL

## 2018-11-06 ENCOUNTER — APPOINTMENT (OUTPATIENT)
Dept: UROLOGY | Facility: CLINIC | Age: 43
End: 2018-11-06
Payer: COMMERCIAL

## 2018-11-06 PROCEDURE — 99214 OFFICE O/P EST MOD 30 MIN: CPT

## 2018-11-07 ENCOUNTER — INBOUND DOCUMENT (OUTPATIENT)
Age: 43
End: 2018-11-07

## 2018-11-08 LAB
APPEARANCE: CLEAR
BACTERIA UR CULT: NORMAL
BACTERIA: NEGATIVE
BILIRUBIN URINE: NEGATIVE
BLOOD URINE: NEGATIVE
COLOR: YELLOW
CORE LAB FLUID CYTOLOGY: NORMAL
GLUCOSE QUALITATIVE U: NEGATIVE MG/DL
HYALINE CASTS: 1 /LPF
KETONES URINE: NEGATIVE
LEUKOCYTE ESTERASE URINE: NEGATIVE
MICROSCOPIC-UA: NORMAL
NITRITE URINE: NEGATIVE
PH URINE: 7
PROTEIN URINE: NEGATIVE MG/DL
RED BLOOD CELLS URINE: 0 /HPF
SPECIFIC GRAVITY URINE: 1.01
SQUAMOUS EPITHELIAL CELLS: 4 /HPF
UROBILINOGEN URINE: NEGATIVE MG/DL
WHITE BLOOD CELLS URINE: 0 /HPF

## 2018-11-17 NOTE — ED PROVIDER NOTE - NS ED MD TWO NIGHTS YN
Verified Results  CBC WITH AUTOMATED DIFFERENTIAL [id number] 12:01AM AYLIN RUFFIN   [Aug 2, 2018 5:26PM AYLIN RUFFIN]  1. Vitamin D is low. Take over the Counter Vitamin D at 2000 IU daily. This medication should be taken daily as lifelong supplement.  2. Patient is anemic. Hemoglobin is at 11 and it should be over 12. Patient should be taking ferrous sulfate 325 mg over-the-counter daily and repeating taking CBC in 2-3 months.  3. Thyroid test is high. Would repeat thyroid test in 2 months.     Test Name Result Flag Reference   WHITE BLOOD COUNT 5.5 K/mcL  4.2-11.0   RED CELL COUNT 4.59 mil/mcL  4.00-5.20   HEMOGLOBIN 11.0 g/dl L 12.0-15.5   HEMATOCRIT 37.0 %  36.0-46.5   MEAN CORPUSCULAR VOLUME 80.6 fL  78.0-100.0   MEAN CORPUSCULAR HEMOGLOBIN 24.0 pg L 26.0-34.0   MEAN CORPUSCULAR HGB CONC 29.7 g/dl L 32.0-36.5   RDW-CV 14.6 %  11.0-15.0   PLATELET COUNT 297 K/mcL  140-450   ONESIMO% 56 %     LYM% 31 %     MON% 9 %     EOS% 3 %     BASO% 1 %     ONESIMO ABS 3.1 K/mcL  1.8-7.7   LYM ABS 1.7 K/mcL  1.0-4.8   MON ABS 0.5 K/mcL  0.3-0.9   EOS ABS 0.1 K/mcL  0.1-0.5   BASO ABS 0.1 K/mcL  0.0-0.3   DIFF TYPE      AUTOMATED DIFFERENTIAL   NRBC 0 /100 WBC  0   PERCENT IMMATURE GRANULOCYTES 0 %     ABSOLUTE IMMATURE GRANULOCYTES 0.0 K/mcL  0-0.2     COMP METABOLIC PNL [id number] 12:01AM AYLIN RUFFIN   [Aug 2, 2018 5:26PM AYLIN RUFFIN]  1. Vitamin D is low. Take over the Counter Vitamin D at 2000 IU daily. This medication should be taken daily as lifelong supplement.  2. Patient is anemic. Hemoglobin is at 11 and it should be over 12. Patient should be taking ferrous sulfate 325 mg over-the-counter daily and repeating taking CBC in 2-3 months.  3. Thyroid test is high. Would repeat thyroid test in 2 months.     Test Name Result Flag Reference   SODIUM 138 mmol/L  135-145   POTASSIUM 4.1 mmol/L  3.4-5.1   CHLORIDE 103 mmol/L     CARBON DIOXIDE 26 mmol/L  21-32   ANION GAP 13 mmol/L  10-20   GLUCOSE 79 mg/dl  65-99   BUN 13  mg/dl  6-20   CREATININE 0.77 mg/dl  0.51-0.95   GFR EST.AFRICAN AMER >90     eGFR results = or >90 mL/min/1.73m2 = Normal kidney function.   GFR EST.NONAFRI AMER >90     eGFR results = or >90 mL/min/1.73m2 = Normal kidney function.   BUN/CREATININE RATIO 17  7-25   BILIRUBIN TOTAL 0.5 mg/dl  0.2-1.0   GOT/AST 18 Units/L  <38   ALKALINE PHOSPHATASE 52 Units/L     ALBUMIN 4.1 g/dl  3.6-5.1   TOTAL PROTEIN 7.0 g/dl  6.4-8.2   GLOBULIN (CALCULATED) 2.9 g/dl  2.0-4.0   A/G RATIO 1.4  1.0-2.4   CALCIUM 9.1 mg/dl  8.4-10.2   GPT/ALT 20 Units/L  <79   FASTING STATUS UNKNOWN hrs       LIPID PNL [id number] 12:01AM AYLIN RUFFIN   [Aug 2, 2018 5:26PM AYLIN RUFFIN]  1. Vitamin D is low. Take over the Counter Vitamin D at 2000 IU daily. This medication should be taken daily as lifelong supplement.  2. Patient is anemic. Hemoglobin is at 11 and it should be over 12. Patient should be taking ferrous sulfate 325 mg over-the-counter daily and repeating taking CBC in 2-3 months.  3. Thyroid test is high. Would repeat thyroid test in 2 months.     Test Name Result Flag Reference   FASTING STATUS UNKNOWN hrs     CHOLESTEROL 207 mg/dl H <200   Desirable            <200  Borderline High      200 to 239  High                 >=240   HDL CHOLESTEROL 40 mg/dl L >49   Low            <40  Borderline Low 40 to 49  Near Optimal   50 to 59  Optimal        >=60   TRIGLYCERIDES 168 mg/dl H <150   Normal                   <150  Borderline High          150 to 199  High                     200 to 499  Very High                >=500   LDL CHOLESTEROL (CALCULATED) 133 mg/dl H <130   OPTIMAL               <100  NEAR OPTIMAL          100-129  BORDERLINE HIGH       130-159  HIGH                  160-189  VERY HIGH             >=190   NON-HDL CHOLESTEROL 167 mg/dl     Therapeutic Target:  CHD and risk equivalents <130  Multiple risk factors    <160  0 to 1 risk factors      <190   CHOLESTEROL/HDL RATIO 5.2 H <4.5     TSH [id number] 12:01AM AUGUSTIN  AYLIN   [Aug 2, 2018 5:26PM AYLIN RUFFNI]  1. Vitamin D is low. Take over the Counter Vitamin D at 2000 IU daily. This medication should be taken daily as lifelong supplement.  2. Patient is anemic. Hemoglobin is at 11 and it should be over 12. Patient should be taking ferrous sulfate 325 mg over-the-counter daily and repeating taking CBC in 2-3 months.  3. Thyroid test is high. Would repeat thyroid test in 2 months.     Test Name Result Flag Reference   TSH 0.267 mcUnits/mL L 0.350-5.000     URINALYSIS SCREEN with MICROSCOPIC IF INDICATED AND URINE CULTURE REFLEX [id number] 12:01AM AYLIN RUFFIN   [Aug 2, 2018 5:26PM AYLIN RUFFIN]  1. Vitamin D is low. Take over the Counter Vitamin D at 2000 IU daily. This medication should be taken daily as lifelong supplement.  2. Patient is anemic. Hemoglobin is at 11 and it should be over 12. Patient should be taking ferrous sulfate 325 mg over-the-counter daily and repeating taking CBC in 2-3 months.  3. Thyroid test is high. Would repeat thyroid test in 2 months.     Test Name Result Flag Reference   URINE COLOR STRAW A YELLOW   APPEARANCE, URINE CLEAR     URINE GLUCOSE NEGATIVE mg/dl  NEGATIVE   URINE BILIRUBIN NEGATIVE  NEGATIVE   KETONES NEGATIVE mg/dl  NEGATIVE   URINE SPECIFIC GRAVITY 1.006  1.005-1.030   RBC-URINE NEGATIVE  NEGATIVE   PH-URINE 8.0 Units H 5.0-7.0   URINE PROTEIN NEGATIVE mg/dl  NEGATIVE   UROBILINOGEN-URINE 0.2 mg/dl  0.0-1.0   NITRITE NEGATIVE  NEGATIVE   WBC-URINE NEGATIVE  NEGATIVE   SPECIMEN TYPE      URINE, CLEAN CATCH/MIDSTREAM     VITAMIN D,25 HYDROXY [id number] 12:01AM AYLIN RUFFIN   [Aug 2, 2018 5:26PM AYLIN RUFFIN]  1. Vitamin D is low. Take over the Counter Vitamin D at 2000 IU daily. This medication should be taken daily as lifelong supplement.  2. Patient is anemic. Hemoglobin is at 11 and it should be over 12. Patient should be taking ferrous sulfate 325 mg over-the-counter daily and repeating taking CBC in 2-3 months.  3. Thyroid test  is high. Would repeat thyroid test in 2 months.     Test Name Result Flag Reference   VIT D,25 HYDROXY 28.6 ng/ml L 30.0-100.0   <20  ng/mL=Vitamin D deficiency  20-29  ng/mL=Vitamin D insufficiency   ng/mL=Optimal Vitamin D  >150 ng/mL=Possible toxicity        Yes

## 2018-11-29 ENCOUNTER — APPOINTMENT (OUTPATIENT)
Dept: UROLOGY | Facility: CLINIC | Age: 43
End: 2018-11-29
Payer: COMMERCIAL

## 2018-11-29 PROCEDURE — 99214 OFFICE O/P EST MOD 30 MIN: CPT

## 2018-11-30 LAB
APPEARANCE: CLEAR
BACTERIA: NEGATIVE
BILIRUBIN URINE: NEGATIVE
BLOOD URINE: NEGATIVE
COLOR: YELLOW
GLUCOSE QUALITATIVE U: NEGATIVE MG/DL
HYALINE CASTS: 1 /LPF
KETONES URINE: NEGATIVE
LEUKOCYTE ESTERASE URINE: NEGATIVE
MICROSCOPIC-UA: NORMAL
NITRITE URINE: NEGATIVE
PH URINE: 7
PROTEIN URINE: NEGATIVE MG/DL
RED BLOOD CELLS URINE: 0 /HPF
SPECIFIC GRAVITY URINE: 1.01
SQUAMOUS EPITHELIAL CELLS: 2 /HPF
UROBILINOGEN URINE: NEGATIVE MG/DL
WHITE BLOOD CELLS URINE: 0 /HPF

## 2018-12-01 LAB — BACTERIA UR CULT: NORMAL

## 2018-12-03 LAB — CORE LAB FLUID CYTOLOGY: NORMAL

## 2018-12-10 ENCOUNTER — RX RENEWAL (OUTPATIENT)
Age: 43
End: 2018-12-10

## 2018-12-31 ENCOUNTER — APPOINTMENT (OUTPATIENT)
Dept: UROLOGY | Facility: CLINIC | Age: 43
End: 2018-12-31

## 2019-01-10 ENCOUNTER — APPOINTMENT (OUTPATIENT)
Dept: UROLOGY | Facility: CLINIC | Age: 44
End: 2019-01-10

## 2019-01-14 ENCOUNTER — APPOINTMENT (OUTPATIENT)
Dept: UROLOGY | Facility: CLINIC | Age: 44
End: 2019-01-14
Payer: COMMERCIAL

## 2019-01-14 PROCEDURE — 99214 OFFICE O/P EST MOD 30 MIN: CPT

## 2019-01-14 RX ORDER — PANTOPRAZOLE 40 MG/1
40 TABLET, DELAYED RELEASE ORAL DAILY
Qty: 90 | Refills: 3 | Status: COMPLETED | COMMUNITY
Start: 2018-11-06 | End: 2019-01-14

## 2019-01-14 NOTE — REVIEW OF SYSTEMS
[Feeling Poorly] : feeling poorly [Painful [place]] : painful [place] [Date of last menstrual period ____] : date of last menstrual period: [unfilled] [Seen by urologist before (Name)  ___] : Previously seen by a urologist: [unfilled] [Urine Infection (bladder/kidney)] : bladder/kidney infection [Joint Pain] : joint pain [Itching] : itching [Anxiety] : anxiety [Depression] : depression [Negative] : Heme/Lymph [Cough] : cough [Fever] : no fever [Feeling Tired] : not feeling tired [Chest Pain] : no chest pain [Constipation] : no constipation [Diarrhea] : no diarrhea [Heartburn] : no heartburn [Dysuria] : no dysuria

## 2019-01-14 NOTE — HISTORY OF PRESENT ILLNESS
[FreeTextEntry1] : Ms. Barrett is a 44 year old female presented with frequent UTIs. Patient reported UTI symptoms started 6 months ago. 6 months ago she notes she had a fever for 4 days and dysuria which she thought was the flu, she was admitted to the ER. The ER informed her she had a UTI and kidney infection. She was sent home with antibiotics which she noted did not relieve her symptoms. She was then admitted to the hospital for the second time for a UTI and kidney infection. 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 No definite shadowing calculus is noted. No right-sided hydronephrosis is note. She was advised to see an urologist, ,and was probably prescribed Fosfomycin for 3 days and Nitrofurantoin 6 weeks.She noted while taking the antibiotic she had a UTI once again. Patient's recent UTI symptoms started one week ago, complained of dysuria. Patient's  noted she is drinking 10 glasses of water and cranberry juice. Patient noted tingling sensation at right anterior thigh.\par 3/26/18: The patient returned for a cystoscopy. \par 4/12/18: The patient returned for a bladder installation. Aside from the installation, the patient was brought back to the room to discuss recent UTI and hospitalization. Noted she was admitted to the hospital from 4/3/18- 4/6/18 for UTI and kidney infection. She is currently taking Cipro for the infection. Urine culture from the hospital grew E.coli Streptococcus. Today urine clear very light yellow.\par 4/19/18: The patient returned for a bladder installation. Noted relief of her bladder symptoms for 2-3 days after first installation. Urine very dilute clear yellow. \par 4/26/18: The patient returned for a bladder installation. Patient has had relief of bladder symptoms from last instillation through today. She and her  are very pleased.\par 5/7/18: The patient returned and reported she is feeling well. Denied any pain from installations. Patient will return for 2 more installations and then installations every other week. \par 5/15/18: The patient returned for a bladder installation. \par 5/24/18: The patient returned for a bladder installation. Noted she had slight pain since last week.\par 6/4/18: The patient returned for a bladder installation. Noted some pain when moving her legs. Patient has agreed to bladder installations every 2 weeks from now on. \par 7/3/18: The patient returned for a bladder installation. Noted she has been having low grade fevers and intermittent mild dysuria. urine was sent for culture today. \par 7/16/18: The patient returned for a bladder installation. Complained of chills this morning but was afebrile. We will check temperature today. I advised the patient to evaluate infection symptoms with her PCP, does not seem urinary.\par 7/23/18: The patient returned for a bladder installation. Patient is going to [place] next week for one week. \par 8/6/18: The patient returned for a cystoscopy and bladder installation. \par 8/13/18: The patient returned for a bladder installation. Noted bladder installation help for 3 days and then gets intermittent dysuria and pain. Noted she recently had a fever of 101 F with chills. Urine culture from 8/6/18 shows no significant growth and UA from 8/6/18 shows no infection. Complained of mid back pain. I will prescribe prednisone but if pain increasingly gets worse she can have a bladder installation if needed. \par 8/29/18: The patient returned and reported she is feeling better since starting prednisone treatment. Noted the pain is a 1/10 now. We will slowly ween off prednisone starting with 10 mg and 5 mg alternating every day. \par 9/17/18: The patient returned and reported she is currently taking 10 mg and 5 mg alternating every day. Noted she takes 10 mg instead of 5 mg if burning is moderate. Noted her pain has resolved significantly. Denied trouble breathing. Patient denied dysuria, gross hematuria, urgency, or incontinence. We will try 5 mg prednisone daily now. \par 10/08/18: The patient returned and reported she has seen significant improvement since starting steroid treatment. Currently taking Prednisone 5 mg and Nitrofurantoin 100 mg daily. Patient is generally feeling well. \par 11/6/18: The patient returned and reported she is experiencing headaches, nausea, and brief sudden periods of burning. Currently taking Prednisone 4 mg daily.  \par 11/29/18: The patient returned and reported that she is currently taking Prednisone 3 mg. Discontinued use of Pantoprazole. Noted she feels intermittent tingling sensation in left LE.\par \par 1/14/19: The patient returned today and reported her bladder feels well and is without pain. Takes two tablets of Prednisone. Recently had pneumonia. I won't decrease the dosage of Prednisone until her pneumonia passes. Once we decrease her dosage of Prednisone, we will evaluate whether or not she should discontinue use of Nitrofurantoin.

## 2019-01-14 NOTE — PHYSICAL EXAM
[General Appearance - Well Developed] : well developed [General Appearance - Well Nourished] : well nourished [Normal Appearance] : normal appearance [Well Groomed] : well groomed [General Appearance - In No Acute Distress] : no acute distress [Abdomen Soft] : soft [Abdomen Tenderness] : non-tender [Costovertebral Angle Tenderness] : no ~M costovertebral angle tenderness [Skin Color & Pigmentation] : normal skin color and pigmentation [Heart Rate And Rhythm] : Heart rate and rhythm were normal [Edema] : no peripheral edema [] : no respiratory distress [Respiration, Rhythm And Depth] : normal respiratory rhythm and effort [Oriented To Time, Place, And Person] : oriented to person, place, and time [Affect] : the affect was normal [Normal Station and Gait] : the gait and station were normal for the patient's age [No Focal Deficits] : no focal deficits [No Palpable Adenopathy] : no palpable adenopathy [Cervical Lymph Nodes Enlarged Posterior Bilaterally] : posterior cervical [Cervical Lymph Nodes Enlarged Anterior Bilaterally] : anterior cervical [Supraclavicular Lymph Nodes Enlarged Bilaterally] : supraclavicular [Axillary Lymph Nodes Enlarged Bilaterally] : axillary [FreeTextEntry1] : No submandibular gland tenderness.\par

## 2019-01-14 NOTE — ASSESSMENT
[FreeTextEntry1] : Ms. Barrett is a 44 year old female presented with frequent UTIs. Patient reported UTI symptoms started 6 months ago. 6 months ago she notes she had a fever for 4 days and dysuria which she thought was the flu, she was admitted to the ER. The ER informed her she had a UTI and kidney infection. She was sent home with antibiotics which she noted did not relieve her symptoms. She was then admitted to the hospital for the second time for a UTI and kidney infection. 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 No definite shadowing calculus is noted. No right-sided hydronephrosis is note. She was advised to see an urologist, ,and was probably prescribed Fosfomycin for 3 days and Nitrofurantoin 6 weeks.She noted while taking the antibiotic she had a UTI once again. Patient's recent UTI symptoms started one week ago, complained of dysuria. Patient's  noted she is drinking 10 glasses of water and cranberry juice. Patient noted tingling sensation at right anterior thigh.\par 3/26/18: The patient returned for a cystoscopy. \par 4/12/18: The patient returned for a bladder installation. Aside from the installation, the patient was brought back to the room to discuss recent UTI and hospitalization. Noted she was admitted to the hospital from 4/3/18- 4/6/18 for UTI and kidney infection. She is currently taking Cipro for the infection. Urine culture from the hospital grew E.coli Streptococcus. Today urine clear very light yellow.\par 4/19/18: The patient returned for a bladder installation. Noted relief of her bladder symptoms for 2-3 days after first installation. Urine very dilute clear yellow. \par 4/26/18: The patient returned for a bladder installation. Patient has had relief of bladder symptoms from last instillation through today. She and her  are very pleased.\par 5/7/18: The patient returned and reported she is feeling well. Denied any pain from installations. Patient will return for 2 more installations and then installations every other week. \par 5/15/18: The patient returned for a bladder installation. \par 5/24/18: The patient returned for a bladder installation. Noted she had slight pain since last week.\par 6/4/18: The patient returned for a bladder installation. Noted some pain when moving her legs. Patient has agreed to bladder installations every 2 weeks from now on. \par 7/3/18: The patient returned for a bladder installation. Noted she has been having low grade fevers and intermittent mild dysuria. urine was sent for culture today. \par 7/16/18: The patient returned for a bladder installation. Complained of chills this morning but was afebrile. We will check temperature today. I advised the patient to evaluate infection symptoms with her PCP, does not seem urinary.\par 7/23/18: The patient returned for a bladder installation. Patient is going to [place] next week for one week. \par 8/6/18: The patient returned for a cystoscopy and bladder installation. \par 8/13/18: The patient returned for a bladder installation. Noted bladder installation help for 3 days and then gets intermittent dysuria and pain. Noted she recently had a fever of 101 F with chills. Urine culture from 8/6/18 shows no significant growth and UA from 8/6/18 shows no infection. Complained of mid back pain. I will prescribe prednisone but if pain increasingly gets worse she can have a bladder installation if needed. \par 8/29/18: The patient returned and reported she is feeling better since starting prednisone treatment. Noted the pain is a 1/10 now. We will slowly ween off prednisone starting with 10 mg and 5 mg alternating every day. \par 9/17/18: The patient returned and reported she is currently taking 10 mg and 5 mg alternating every day. Noted she takes 10 mg instead of 5 mg if burning is moderate. Noted her pain has resolved significantly. Denied trouble breathing. Patient denied dysuria, gross hematuria, urgency, or incontinence. We will try 5 mg prednisone daily now. \par 10/08/18: The patient returned and reported she has seen significant improvement since starting steroid treatment. Currently taking Prednisone 5 mg and Nitrofurantoin 100 mg daily. Patient is generally feeling well. \par 11/6/18: The patient returned and reported she is experiencing headaches, nausea, and brief sudden periods of burning. Currently taking Prednisone 4 mg daily.  \par 11/29/18: The patient returned and reported that she is currently taking Prednisone 3 mg. Discontinued use of Pantoprazole. Noted she feels intermittent tingling sensation in left LE.\par \par 1/14/19: The patient returned today and reported her bladder feels well and is without pain. Takes two tablets of Prednisone. Recently had pneumonia. I won't decrease the dosage of Prednisone until her pneumonia passes. Once we decrease her dosage of Prednisone, we will evaluate whether or not she should discontinue use of Nitrofurantoin.\par \par I renewed the patient Prednisone 1 mg, take two tablets daily.\par \par The patient will return in one month for a follow up.

## 2019-01-14 NOTE — ADDENDUM
[FreeTextEntry1] : This note was authored by Salma Hensley working as a scribe for Dr. Chato Judd.\par I, Dr. Chato Judd, have reviewed the content of this note and confirm it is true and accurate. I personally performed the history and physical examination and made all the decisions.\par 1/14/19\par

## 2019-02-15 LAB
APPEARANCE: CLEAR
BACTERIA UR CULT: ABNORMAL
BACTERIA: NEGATIVE
BILIRUBIN URINE: NEGATIVE
BLOOD URINE: NEGATIVE
COLOR: YELLOW
GLUCOSE QUALITATIVE U: NEGATIVE MG/DL
KETONES URINE: NEGATIVE
LEUKOCYTE ESTERASE URINE: NEGATIVE
MICROSCOPIC-UA: NORMAL
NITRITE URINE: NEGATIVE
PH URINE: 6.5
PROTEIN URINE: NEGATIVE MG/DL
RED BLOOD CELLS URINE: 0 /HPF
SPECIFIC GRAVITY URINE: 1.01
SQUAMOUS EPITHELIAL CELLS: 0 /HPF
UROBILINOGEN URINE: NEGATIVE MG/DL
WHITE BLOOD CELLS URINE: 0 /HPF

## 2019-02-15 RX ORDER — FLUCONAZOLE 200 MG/1
200 TABLET ORAL DAILY
Qty: 1 | Refills: 0 | Status: ACTIVE | COMMUNITY
Start: 2019-02-05 | End: 1900-01-01

## 2019-02-19 ENCOUNTER — APPOINTMENT (OUTPATIENT)
Dept: UROLOGY | Facility: CLINIC | Age: 44
End: 2019-02-19

## 2019-02-20 ENCOUNTER — APPOINTMENT (OUTPATIENT)
Dept: UROLOGY | Facility: CLINIC | Age: 44
End: 2019-02-20
Payer: COMMERCIAL

## 2019-02-20 VITALS
HEART RATE: 80 BPM | DIASTOLIC BLOOD PRESSURE: 67 MMHG | BODY MASS INDEX: 26.81 KG/M2 | HEIGHT: 61 IN | WEIGHT: 142 LBS | SYSTOLIC BLOOD PRESSURE: 102 MMHG | RESPIRATION RATE: 18 BRPM

## 2019-02-20 PROCEDURE — 99214 OFFICE O/P EST MOD 30 MIN: CPT

## 2019-02-22 ENCOUNTER — RESULT REVIEW (OUTPATIENT)
Age: 44
End: 2019-02-22

## 2019-02-22 LAB
APPEARANCE: CLEAR
BACTERIA UR CULT: ABNORMAL
BACTERIA: NEGATIVE
BILIRUBIN URINE: NEGATIVE
BLOOD URINE: NEGATIVE
COLOR: NORMAL
GLUCOSE QUALITATIVE U: NEGATIVE
HYALINE CASTS: 1 /LPF
KETONES URINE: NEGATIVE
LEUKOCYTE ESTERASE URINE: NEGATIVE
MICROSCOPIC-UA: NORMAL
NITRITE URINE: NEGATIVE
PH URINE: 7
PROTEIN URINE: NORMAL
RED BLOOD CELLS URINE: 1 /HPF
SPECIFIC GRAVITY URINE: 1.01
SQUAMOUS EPITHELIAL CELLS: 10 /HPF
UROBILINOGEN URINE: NORMAL
WHITE BLOOD CELLS URINE: 1 /HPF

## 2019-03-25 ENCOUNTER — APPOINTMENT (OUTPATIENT)
Dept: UROLOGY | Facility: CLINIC | Age: 44
End: 2019-03-25
Payer: COMMERCIAL

## 2019-03-25 PROCEDURE — 99214 OFFICE O/P EST MOD 30 MIN: CPT

## 2019-03-25 NOTE — PHYSICAL EXAM
[General Appearance - Well Developed] : well developed [General Appearance - Well Nourished] : well nourished [Normal Appearance] : normal appearance [Well Groomed] : well groomed [General Appearance - In No Acute Distress] : no acute distress [Abdomen Soft] : soft [Abdomen Tenderness] : non-tender [Costovertebral Angle Tenderness] : no ~M costovertebral angle tenderness [Skin Color & Pigmentation] : normal skin color and pigmentation [Edema] : no peripheral edema [] : no respiratory distress [Respiration, Rhythm And Depth] : normal respiratory rhythm and effort [Oriented To Time, Place, And Person] : oriented to person, place, and time [Affect] : the affect was normal [Normal Station and Gait] : the gait and station were normal for the patient's age [No Focal Deficits] : no focal deficits [No Palpable Adenopathy] : no palpable adenopathy [Cervical Lymph Nodes Enlarged Posterior Bilaterally] : posterior cervical [Cervical Lymph Nodes Enlarged Anterior Bilaterally] : anterior cervical [Supraclavicular Lymph Nodes Enlarged Bilaterally] : supraclavicular [Axillary Lymph Nodes Enlarged Bilaterally] : axillary [FreeTextEntry1] : No submandibular gland tenderness.\par

## 2019-03-25 NOTE — HISTORY OF PRESENT ILLNESS
[FreeTextEntry1] : Ms. Barrett is a 44 year old female presented with frequent UTIs. Patient reported UTI symptoms started 6 months ago. 6 months ago she notes she had a fever for 4 days and dysuria which she thought was the flu, she was admitted to the ER. The ER informed her she had a UTI and kidney infection. She was sent home with antibiotics which she noted did not relieve her symptoms. She was then admitted to the hospital for the second time for a UTI and kidney infection. 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 No definite shadowing calculus is noted. No right-sided hydronephrosis is note. She was advised to see an urologist, ,and was probably prescribed Fosfomycin for 3 days and Nitrofurantoin 6 weeks.She noted while taking the antibiotic she had a UTI once again. Patient's recent UTI symptoms started one week ago, complained of dysuria. Patient's  noted she is drinking 10 glasses of water and cranberry juice. Patient noted tingling sensation at right anterior thigh.\par 3/26/18: The patient returned for a cystoscopy. \par 4/12/18: The patient returned for a bladder installation. Aside from the installation, the patient was brought back to the room to discuss recent UTI and hospitalization. Noted she was admitted to the hospital from 4/3/18- 4/6/18 for UTI and kidney infection. She is currently taking Cipro for the infection. Urine culture from the hospital grew E.coli Streptococcus. Today urine clear very light yellow.\par 4/19/18: The patient returned for a bladder installation. Noted relief of her bladder symptoms for 2-3 days after first installation. Urine very dilute clear yellow. \par 4/26/18: The patient returned for a bladder installation. Patient has had relief of bladder symptoms from last instillation through today. She and her  are very pleased.\par 5/7/18: The patient returned and reported she is feeling well. Denied any pain from installations. Patient will return for 2 more installations and then installations every other week. \par 5/15/18: The patient returned for a bladder installation. \par 5/24/18: The patient returned for a bladder installation. Noted she had slight pain since last week.\par 6/4/18: The patient returned for a bladder installation. Noted some pain when moving her legs. Patient has agreed to bladder installations every 2 weeks from now on. \par 7/3/18: The patient returned for a bladder installation. Noted she has been having low grade fevers and intermittent mild dysuria. urine was sent for culture today. \par 7/16/18: The patient returned for a bladder installation. Complained of chills this morning but was afebrile. We will check temperature today. I advised the patient to evaluate infection symptoms with her PCP, does not seem urinary.\par 7/23/18: The patient returned for a bladder installation. Patient is going to [place] next week for one week. \par 8/6/18: The patient returned for a cystoscopy and bladder installation. \par 8/13/18: The patient returned for a bladder installation. Noted bladder installation help for 3 days and then gets intermittent dysuria and pain. Noted she recently had a fever of 101 F with chills. Urine culture from 8/6/18 shows no significant growth and UA from 8/6/18 shows no infection. Complained of mid back pain. I will prescribe prednisone but if pain increasingly gets worse she can have a bladder installation if needed. \par 8/29/18: The patient returned and reported she is feeling better since starting prednisone treatment. Noted the pain is a 1/10 now. We will slowly ween off prednisone starting with 10 mg and 5 mg alternating every day. \par 9/17/18: The patient returned and reported she is currently taking 10 mg and 5 mg alternating every day. Noted she takes 10 mg instead of 5 mg if burning is moderate. Noted her pain has resolved significantly. Denied trouble breathing. Patient denied dysuria, gross hematuria, urgency, or incontinence. We will try 5 mg prednisone daily now. \par 10/08/18: The patient returned and reported she has seen significant improvement since starting steroid treatment. Currently taking Prednisone 5 mg and Nitrofurantoin 100 mg daily. Patient is generally feeling well. \par 11/6/18: The patient returned and reported she is experiencing headaches, nausea, and brief sudden periods of burning. Currently taking Prednisone 4 mg daily.  \par 11/29/18: The patient returned and reported that she is currently taking Prednisone 3 mg. Discontinued use of Pantoprazole. Noted she feels intermittent tingling sensation in left LE.\par 1/14/19: The patient returned and reported her bladder feels well and is without pain. Takes two tablets of Prednisone. Recently had pneumonia. I won't decrease the dosage of Prednisone until her pneumonia passes. Once we decrease her dosage of Prednisone, we will evaluate whether or not she should discontinue use of Nitrofurantoin.\par 2/20/19: The patient returned and reported that her bladder still feels well and is without pain. Currently takes one tablet of Prednisone 10 mg.\par \par 3/25/19: The patient returned today and reported that she had itching for 2-3 days since her last visit. Used salt water, which resolved the symptoms. Currently alternates between Prednisone 4 mg and 5 mg. Patient has gained 12 pounds. Notes that she walks for one hour daily. Complains of hair loss and sleeping more than normal, will consult with PCP. Her urination is satisfactory. Denies bladder pain. Complains of burning when she has constipation.

## 2019-03-25 NOTE — ADDENDUM
[FreeTextEntry1] : This note was authored by Salma Hensley working as a scribe for Dr. Chato Judd.\par I, Dr. Chato Judd, have reviewed the content of this note and confirm it is true and accurate. I personally performed the history and physical examination and made all the decisions.\par 3/25/19\par

## 2019-03-25 NOTE — HISTORY OF PRESENT ILLNESS
[FreeTextEntry1] : Ms. Barrett is a 44 year old female presented with frequent UTIs. Patient reported UTI symptoms started 6 months ago. 6 months ago she notes she had a fever for 4 days and dysuria which she thought was the flu, she was admitted to the ER. The ER informed her she had a UTI and kidney infection. She was sent home with antibiotics which she noted did not relieve her symptoms. She was then admitted to the hospital for the second time for a UTI and kidney infection. 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 No definite shadowing calculus is noted. No right-sided hydronephrosis is note. She was advised to see an urologist, ,and was probably prescribed Fosfomycin for 3 days and Nitrofurantoin 6 weeks.She noted while taking the antibiotic she had a UTI once again. Patient's recent UTI symptoms started one week ago, complained of dysuria. Patient's  noted she is drinking 10 glasses of water and cranberry juice. Patient noted tingling sensation at right anterior thigh.\par 3/26/18: The patient returned for a cystoscopy. \par 4/12/18: The patient returned for a bladder installation. Aside from the installation, the patient was brought back to the room to discuss recent UTI and hospitalization. Noted she was admitted to the hospital from 4/3/18- 4/6/18 for UTI and kidney infection. She is currently taking Cipro for the infection. Urine culture from the hospital grew E.coli Streptococcus. Today urine clear very light yellow.\par 4/19/18: The patient returned for a bladder installation. Noted relief of her bladder symptoms for 2-3 days after first installation. Urine very dilute clear yellow. \par 4/26/18: The patient returned for a bladder installation. Patient has had relief of bladder symptoms from last instillation through today. She and her  are very pleased.\par 5/7/18: The patient returned and reported she is feeling well. Denied any pain from installations. Patient will return for 2 more installations and then installations every other week. \par 5/15/18: The patient returned for a bladder installation. \par 5/24/18: The patient returned for a bladder installation. Noted she had slight pain since last week.\par 6/4/18: The patient returned for a bladder installation. Noted some pain when moving her legs. Patient has agreed to bladder installations every 2 weeks from now on. \par 7/3/18: The patient returned for a bladder installation. Noted she has been having low grade fevers and intermittent mild dysuria. urine was sent for culture today. \par 7/16/18: The patient returned for a bladder installation. Complained of chills this morning but was afebrile. We will check temperature today. I advised the patient to evaluate infection symptoms with her PCP, does not seem urinary.\par 7/23/18: The patient returned for a bladder installation. Patient is going to [place] next week for one week. \par 8/6/18: The patient returned for a cystoscopy and bladder installation. \par 8/13/18: The patient returned for a bladder installation. Noted bladder installation help for 3 days and then gets intermittent dysuria and pain. Noted she recently had a fever of 101 F with chills. Urine culture from 8/6/18 shows no significant growth and UA from 8/6/18 shows no infection. Complained of mid back pain. I will prescribe prednisone but if pain increasingly gets worse she can have a bladder installation if needed. \par 8/29/18: The patient returned and reported she is feeling better since starting prednisone treatment. Noted the pain is a 1/10 now. We will slowly ween off prednisone starting with 10 mg and 5 mg alternating every day. \par 9/17/18: The patient returned and reported she is currently taking 10 mg and 5 mg alternating every day. Noted she takes 10 mg instead of 5 mg if burning is moderate. Noted her pain has resolved significantly. Denied trouble breathing. Patient denied dysuria, gross hematuria, urgency, or incontinence. We will try 5 mg prednisone daily now. \par 10/08/18: The patient returned and reported she has seen significant improvement since starting steroid treatment. Currently taking Prednisone 5 mg and Nitrofurantoin 100 mg daily. Patient is generally feeling well. \par 11/6/18: The patient returned and reported she is experiencing headaches, nausea, and brief sudden periods of burning. Currently taking Prednisone 4 mg daily.  \par 11/29/18: The patient returned and reported that she is currently taking Prednisone 3 mg. Discontinued use of Pantoprazole. Noted she feels intermittent tingling sensation in left LE.\par 1/14/19: The patient returned and reported her bladder feels well and is without pain. Takes two tablets of Prednisone. Recently had pneumonia. I won't decrease the dosage of Prednisone until her pneumonia passes. Once we decrease her dosage of Prednisone, we will evaluate whether or not she should discontinue use of Nitrofurantoin.\par \par 2/20/19: The patient returned today and reported that her bladder still feels well and is without pain. Currently takes one tablet of Prednisone 10 mg.

## 2019-03-25 NOTE — ADDENDUM
[FreeTextEntry1] : This note was authored by Salma Hensley working as a scribe for Dr. Chato Judd.\par I, Dr. Chato Judd, have reviewed the content of this note and confirm it is true and accurate. I personally performed the history and physical examination and made all the decisions.\par 2/20/19\par

## 2019-03-25 NOTE — REVIEW OF SYSTEMS
[Feeling Poorly] : feeling poorly [Nasal Discharge] : nasal discharge [Cough] : cough [Constipation] : constipation [Painful [place]] : painful [place] [Date of last menstrual period ____] : date of last menstrual period: [unfilled] [Seen by urologist before (Name)  ___] : Previously seen by a urologist: [unfilled] [Urine Infection (bladder/kidney)] : bladder/kidney infection [Joint Pain] : joint pain [Itching] : itching [Anxiety] : anxiety [Depression] : depression [Negative] : Heme/Lymph [Recent Weight Gain (___ Lbs)] : recent [unfilled] ~Ulb weight gain [Fever] : no fever [Feeling Tired] : not feeling tired [Chest Pain] : no chest pain [Diarrhea] : no diarrhea [Heartburn] : no heartburn [Dysuria] : no dysuria

## 2019-03-25 NOTE — ASSESSMENT
[FreeTextEntry1] : Ms. Barrett is a 44 year old female presented with frequent UTIs. Patient reported UTI symptoms started 6 months ago. 6 months ago she notes she had a fever for 4 days and dysuria which she thought was the flu, she was admitted to the ER. The ER informed her she had a UTI and kidney infection. She was sent home with antibiotics which she noted did not relieve her symptoms. She was then admitted to the hospital for the second time for a UTI and kidney infection. 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 No definite shadowing calculus is noted. No right-sided hydronephrosis is note. She was advised to see an urologist, ,and was probably prescribed Fosfomycin for 3 days and Nitrofurantoin 6 weeks.She noted while taking the antibiotic she had a UTI once again. Patient's recent UTI symptoms started one week ago, complained of dysuria. Patient's  noted she is drinking 10 glasses of water and cranberry juice. Patient noted tingling sensation at right anterior thigh.\par 3/26/18: The patient returned for a cystoscopy. \par 4/12/18: The patient returned for a bladder installation. Aside from the installation, the patient was brought back to the room to discuss recent UTI and hospitalization. Noted she was admitted to the hospital from 4/3/18- 4/6/18 for UTI and kidney infection. She is currently taking Cipro for the infection. Urine culture from the hospital grew E.coli Streptococcus. Today urine clear very light yellow.\par 4/19/18: The patient returned for a bladder installation. Noted relief of her bladder symptoms for 2-3 days after first installation. Urine very dilute clear yellow. \par 4/26/18: The patient returned for a bladder installation. Patient has had relief of bladder symptoms from last instillation through today. She and her  are very pleased.\par 5/7/18: The patient returned and reported she is feeling well. Denied any pain from installations. Patient will return for 2 more installations and then installations every other week. \par 5/15/18: The patient returned for a bladder installation. \par 5/24/18: The patient returned for a bladder installation. Noted she had slight pain since last week.\par 6/4/18: The patient returned for a bladder installation. Noted some pain when moving her legs. Patient has agreed to bladder installations every 2 weeks from now on. \par 7/3/18: The patient returned for a bladder installation. Noted she has been having low grade fevers and intermittent mild dysuria. urine was sent for culture today. \par 7/16/18: The patient returned for a bladder installation. Complained of chills this morning but was afebrile. We will check temperature today. I advised the patient to evaluate infection symptoms with her PCP, does not seem urinary.\par 7/23/18: The patient returned for a bladder installation. Patient is going to [place] next week for one week. \par 8/6/18: The patient returned for a cystoscopy and bladder installation. \par 8/13/18: The patient returned for a bladder installation. Noted bladder installation help for 3 days and then gets intermittent dysuria and pain. Noted she recently had a fever of 101 F with chills. Urine culture from 8/6/18 shows no significant growth and UA from 8/6/18 shows no infection. Complained of mid back pain. I will prescribe prednisone but if pain increasingly gets worse she can have a bladder installation if needed. \par 8/29/18: The patient returned and reported she is feeling better since starting prednisone treatment. Noted the pain is a 1/10 now. We will slowly ween off prednisone starting with 10 mg and 5 mg alternating every day. \par 9/17/18: The patient returned and reported she is currently taking 10 mg and 5 mg alternating every day. Noted she takes 10 mg instead of 5 mg if burning is moderate. Noted her pain has resolved significantly. Denied trouble breathing. Patient denied dysuria, gross hematuria, urgency, or incontinence. We will try 5 mg prednisone daily now. \par 10/08/18: The patient returned and reported she has seen significant improvement since starting steroid treatment. Currently taking Prednisone 5 mg and Nitrofurantoin 100 mg daily. Patient is generally feeling well. \par 11/6/18: The patient returned and reported she is experiencing headaches, nausea, and brief sudden periods of burning. Currently taking Prednisone 4 mg daily.  \par 11/29/18: The patient returned and reported that she is currently taking Prednisone 3 mg. Discontinued use of Pantoprazole. Noted she feels intermittent tingling sensation in left LE.\par 1/14/19: The patient returned and reported her bladder feels well and is without pain. Takes two tablets of Prednisone. Recently had pneumonia. I won't decrease the dosage of Prednisone until her pneumonia passes. Once we decrease her dosage of Prednisone, we will evaluate whether or not she should discontinue use of Nitrofurantoin.\par 2/20/19: The patient returned and reported that her bladder still feels well and is without pain. Currently takes one tablet of Prednisone 10 mg.\par \par 3/25/19: The patient returned today and reported that she had itching for 2-3 days since her last visit. Used salt water, which resolved the symptoms. Currently alternates between Prednisone 4 mg and 5 mg. Patient has gained 12 pounds. Notes that she walks for one hour daily. Complains of hair loss and sleeping more than normal, will consult with PCP. Her urination is satisfactory. Denies bladder pain. Complains of burning when she has constipation.\par \par The patient will alternate between one tablet of Prednisone 5 mg and one tablet of Prednisone 3 mg for one week. After one week, the patient will take one tablet of Prednisone 3 mg. After two weeks, the patient will take one tablet of Prednisone 2 mg daily.\par I advised the patient to take Colace, twice a day, with 2 large glasses of water for her constipation.\par \par The patient will undergo a DEXA bone density appendicular and axial.\par \par The patient will return in one month for a follow up.

## 2019-03-25 NOTE — REVIEW OF SYSTEMS
[Feeling Poorly] : feeling poorly [Cough] : cough [Constipation] : constipation [Painful [place]] : painful [place] [Date of last menstrual period ____] : date of last menstrual period: [unfilled] [Seen by urologist before (Name)  ___] : Previously seen by a urologist: [unfilled] [Urine Infection (bladder/kidney)] : bladder/kidney infection [Joint Pain] : joint pain [Itching] : itching [Anxiety] : anxiety [Depression] : depression [Negative] : Heme/Lymph [Nasal Discharge] : nasal discharge [Fever] : no fever [Feeling Tired] : not feeling tired [Chest Pain] : no chest pain [Diarrhea] : no diarrhea [Heartburn] : no heartburn [Dysuria] : no dysuria

## 2019-03-25 NOTE — ASSESSMENT
[FreeTextEntry1] : Ms. Barrett is a 44 year old female presented with frequent UTIs. Patient reported UTI symptoms started 6 months ago. 6 months ago she notes she had a fever for 4 days and dysuria which she thought was the flu, she was admitted to the ER. The ER informed her she had a UTI and kidney infection. She was sent home with antibiotics which she noted did not relieve her symptoms. She was then admitted to the hospital for the second time for a UTI and kidney infection. 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 No definite shadowing calculus is noted. No right-sided hydronephrosis is note. She was advised to see an urologist, ,and was probably prescribed Fosfomycin for 3 days and Nitrofurantoin 6 weeks.She noted while taking the antibiotic she had a UTI once again. Patient's recent UTI symptoms started one week ago, complained of dysuria. Patient's  noted she is drinking 10 glasses of water and cranberry juice. Patient noted tingling sensation at right anterior thigh.\par 3/26/18: The patient returned for a cystoscopy. \par 4/12/18: The patient returned for a bladder installation. Aside from the installation, the patient was brought back to the room to discuss recent UTI and hospitalization. Noted she was admitted to the hospital from 4/3/18- 4/6/18 for UTI and kidney infection. She is currently taking Cipro for the infection. Urine culture from the hospital grew E.coli Streptococcus. Today urine clear very light yellow.\par 4/19/18: The patient returned for a bladder installation. Noted relief of her bladder symptoms for 2-3 days after first installation. Urine very dilute clear yellow. \par 4/26/18: The patient returned for a bladder installation. Patient has had relief of bladder symptoms from last instillation through today. She and her  are very pleased.\par 5/7/18: The patient returned and reported she is feeling well. Denied any pain from installations. Patient will return for 2 more installations and then installations every other week. \par 5/15/18: The patient returned for a bladder installation. \par 5/24/18: The patient returned for a bladder installation. Noted she had slight pain since last week.\par 6/4/18: The patient returned for a bladder installation. Noted some pain when moving her legs. Patient has agreed to bladder installations every 2 weeks from now on. \par 7/3/18: The patient returned for a bladder installation. Noted she has been having low grade fevers and intermittent mild dysuria. urine was sent for culture today. \par 7/16/18: The patient returned for a bladder installation. Complained of chills this morning but was afebrile. We will check temperature today. I advised the patient to evaluate infection symptoms with her PCP, does not seem urinary.\par 7/23/18: The patient returned for a bladder installation. Patient is going to [place] next week for one week. \par 8/6/18: The patient returned for a cystoscopy and bladder installation. \par 8/13/18: The patient returned for a bladder installation. Noted bladder installation help for 3 days and then gets intermittent dysuria and pain. Noted she recently had a fever of 101 F with chills. Urine culture from 8/6/18 shows no significant growth and UA from 8/6/18 shows no infection. Complained of mid back pain. I will prescribe prednisone but if pain increasingly gets worse she can have a bladder installation if needed. \par 8/29/18: The patient returned and reported she is feeling better since starting prednisone treatment. Noted the pain is a 1/10 now. We will slowly ween off prednisone starting with 10 mg and 5 mg alternating every day. \par 9/17/18: The patient returned and reported she is currently taking 10 mg and 5 mg alternating every day. Noted she takes 10 mg instead of 5 mg if burning is moderate. Noted her pain has resolved significantly. Denied trouble breathing. Patient denied dysuria, gross hematuria, urgency, or incontinence. We will try 5 mg prednisone daily now. \par 10/08/18: The patient returned and reported she has seen significant improvement since starting steroid treatment. Currently taking Prednisone 5 mg and Nitrofurantoin 100 mg daily. Patient is generally feeling well. \par 11/6/18: The patient returned and reported she is experiencing headaches, nausea, and brief sudden periods of burning. Currently taking Prednisone 4 mg daily.  \par 11/29/18: The patient returned and reported that she is currently taking Prednisone 3 mg. Discontinued use of Pantoprazole. Noted she feels intermittent tingling sensation in left LE.\par 1/14/19: The patient returned and reported her bladder feels well and is without pain. Takes two tablets of Prednisone. Recently had pneumonia. I won't decrease the dosage of Prednisone until her pneumonia passes. Once we decrease her dosage of Prednisone, we will evaluate whether or not she should discontinue use of Nitrofurantoin.\par \par 2/20/19: The patient returned today and reported that her bladder still feels well and is without pain. Currently takes one tablet of Prednisone 10 mg.\par \par The patient will take one tablet of Prednisone 5 mg and one tablet of Prednisone 10 mg for two weeks. After two weeks, the patient will take one tablet of Prednisone 5 mg.\par \par The patient produced a urine sample which will be sent for urinalysis, non-Gyn cytology, and urine culture.\par \par If the patient continues taking Prednisone, I will schedule her for a bone density after her next visit.\par \par The patient will return in one month for a follow up.

## 2019-03-31 ENCOUNTER — FORM ENCOUNTER (OUTPATIENT)
Age: 44
End: 2019-03-31

## 2019-04-01 ENCOUNTER — APPOINTMENT (OUTPATIENT)
Dept: RADIOLOGY | Facility: IMAGING CENTER | Age: 44
End: 2019-04-01
Payer: COMMERCIAL

## 2019-04-01 ENCOUNTER — OUTPATIENT (OUTPATIENT)
Dept: OUTPATIENT SERVICES | Facility: HOSPITAL | Age: 44
LOS: 1 days | End: 2019-04-01
Payer: COMMERCIAL

## 2019-04-01 DIAGNOSIS — R39.9 UNSPECIFIED SYMPTOMS AND SIGNS INVOLVING THE GENITOURINARY SYSTEM: Chronic | ICD-10-CM

## 2019-04-01 DIAGNOSIS — Z98.891 HISTORY OF UTERINE SCAR FROM PREVIOUS SURGERY: Chronic | ICD-10-CM

## 2019-04-01 DIAGNOSIS — Z91.89 OTHER SPECIFIED PERSONAL RISK FACTORS, NOT ELSEWHERE CLASSIFIED: ICD-10-CM

## 2019-04-01 PROCEDURE — 77080 DXA BONE DENSITY AXIAL: CPT | Mod: 26

## 2019-04-01 PROCEDURE — 77080 DXA BONE DENSITY AXIAL: CPT

## 2019-04-22 ENCOUNTER — APPOINTMENT (OUTPATIENT)
Dept: UROLOGY | Facility: CLINIC | Age: 44
End: 2019-04-22
Payer: COMMERCIAL

## 2019-04-22 PROCEDURE — 99214 OFFICE O/P EST MOD 30 MIN: CPT

## 2019-04-22 NOTE — ASSESSMENT
[FreeTextEntry1] : Ms. Barrett is a 44 year old female presented with frequent UTIs. Patient reported UTI symptoms started 6 months ago. 6 months ago she notes she had a fever for 4 days and dysuria which she thought was the flu, she was admitted to the ER. The ER informed her she had a UTI and kidney infection. She was sent home with antibiotics which she noted did not relieve her symptoms. She was then admitted to the hospital for the second time for a UTI and kidney infection. 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 No definite shadowing calculus is noted. No right-sided hydronephrosis is note. She was advised to see an urologist, ,and was probably prescribed Fosfomycin for 3 days and Nitrofurantoin 6 weeks.She noted while taking the antibiotic she had a UTI once again. Patient's recent UTI symptoms started one week ago, complained of dysuria. Patient's  noted she is drinking 10 glasses of water and cranberry juice. Patient noted tingling sensation at right anterior thigh.\par 3/26/18: The patient returned for a cystoscopy. \par 4/12/18: The patient returned for a bladder installation. Aside from the installation, the patient was brought back to the room to discuss recent UTI and hospitalization. Noted she was admitted to the hospital from 4/3/18- 4/6/18 for UTI and kidney infection. She is currently taking Cipro for the infection. Urine culture from the hospital grew E.coli Streptococcus. Today urine clear very light yellow.\par 4/19/18: The patient returned for a bladder installation. Noted relief of her bladder symptoms for 2-3 days after first installation. Urine very dilute clear yellow. \par 4/26/18: The patient returned for a bladder installation. Patient has had relief of bladder symptoms from last instillation through today. She and her  are very pleased.\par 5/7/18: The patient returned and reported she is feeling well. Denied any pain from installations. Patient will return for 2 more installations and then installations every other week. \par 5/15/18: The patient returned for a bladder installation. \par 5/24/18: The patient returned for a bladder installation. Noted she had slight pain since last week.\par 6/4/18: The patient returned for a bladder installation. Noted some pain when moving her legs. Patient has agreed to bladder installations every 2 weeks from now on. \par 7/3/18: The patient returned for a bladder installation. Noted she has been having low grade fevers and intermittent mild dysuria. urine was sent for culture today. \par 7/16/18: The patient returned for a bladder installation. Complained of chills this morning but was afebrile. We will check temperature today. I advised the patient to evaluate infection symptoms with her PCP, does not seem urinary.\par 7/23/18: The patient returned for a bladder installation. Patient is going to [place] next week for one week. \par 8/6/18: The patient returned for a cystoscopy and bladder installation. \par 8/13/18: The patient returned for a bladder installation. Noted bladder installation help for 3 days and then gets intermittent dysuria and pain. Noted she recently had a fever of 101 F with chills. Urine culture from 8/6/18 shows no significant growth and UA from 8/6/18 shows no infection. Complained of mid back pain. I will prescribe prednisone but if pain increasingly gets worse she can have a bladder installation if needed. \par 8/29/18: The patient returned and reported she is feeling better since starting prednisone treatment. Noted the pain is a 1/10 now. We will slowly ween off prednisone starting with 10 mg and 5 mg alternating every day. \par 9/17/18: The patient returned and reported she is currently taking 10 mg and 5 mg alternating every day. Noted she takes 10 mg instead of 5 mg if burning is moderate. Noted her pain has resolved significantly. Denied trouble breathing. Patient denied dysuria, gross hematuria, urgency, or incontinence. We will try 5 mg prednisone daily now. \par 10/08/18: The patient returned and reported she has seen significant improvement since starting steroid treatment. Currently taking Prednisone 5 mg and Nitrofurantoin 100 mg daily. Patient is generally feeling well. \par 11/6/18: The patient returned and reported she is experiencing headaches, nausea, and brief sudden periods of burning. Currently taking Prednisone 4 mg daily.  \par 11/29/18: The patient returned and reported that she is currently taking Prednisone 3 mg. Discontinued use of Pantoprazole. Noted she feels intermittent tingling sensation in left LE.\par 1/14/19: The patient returned and reported her bladder feels well and is without pain. Takes two tablets of Prednisone. Recently had pneumonia. I won't decrease the dosage of Prednisone until her pneumonia passes. Once we decrease her dosage of Prednisone, we will evaluate whether or not she should discontinue use of Nitrofurantoin.\par 2/20/19: The patient returned and reported that her bladder still feels well and is without pain. Currently takes one tablet of Prednisone 10 mg.\par 3/25/19: The patient returned and reported that she had itching for 2-3 days since her last visit. Used salt water, which resolved the symptoms. Currently alternates between Prednisone 4 mg and 5 mg. Patient has gained 12 pounds. Noted that she walks for one hour daily. Complained of hair loss and sleeping more than normal, will consult with PCP. Her urination is satisfactory. Denied bladder pain. Complained of burning when she has constipation.\par \par 4/22/19: The patient returned today to discuss bone density results. Bone density - central from 4/1/19 findings were unremarkable. Reported that she has occasionally bladder pain with a severity of 2 or 3/10 that lasts for a couple of seconds. Currently alternates between Prednisone 2 mg and 3 mg. Patient denies dysuria, gross hematuria, urgency, or incontinence. Takes Nitrofurantoin. Denies history of kidney stones, asthma and diabetes. Had pneumonia three months ago and many episodes of bronchitis.\par \par I renewed the patient Prednisone 1 mg, four tablets daily.\par Despite the prescription instructions, the patient will continue alternating between one tablet of Prednisone 2 mg and one tablet of Prednisone 3 mg for three months. \par I renewed the patient Nitrofurantoin Macrocrystal 100 mg, one capsule at bedtime.\par \par The patient produced a urine sample which will be sent for urinalysis, urine cytology, and urine culture.\par \par The patient will return in 3 months for a follow up.

## 2019-04-22 NOTE — REVIEW OF SYSTEMS
[Feeling Poorly] : feeling poorly [Recent Weight Gain (___ Lbs)] : recent [unfilled] ~Ulb weight gain [Nasal Discharge] : nasal discharge [Cough] : cough [Constipation] : constipation [Painful Declo] : painful Declo [Date of last menstrual period ____] : date of last menstrual period: [unfilled] [Seen by urologist before (Name)  ___] : Previously seen by a urologist: [unfilled] [Urine Infection (bladder/kidney)] : bladder/kidney infection [Itching] : itching [Joint Pain] : joint pain [Depression] : depression [Anxiety] : anxiety [Negative] : Heme/Lymph [Fever] : no fever [Diarrhea] : no diarrhea [Chest Pain] : no chest pain [Feeling Tired] : not feeling tired [Dysuria] : no dysuria [Heartburn] : no heartburn [Incontinence] : no incontinence

## 2019-04-22 NOTE — HISTORY OF PRESENT ILLNESS
[FreeTextEntry1] : Ms. Barrett is a 44 year old female presented with frequent UTIs. Patient reported UTI symptoms started 6 months ago. 6 months ago she notes she had a fever for 4 days and dysuria which she thought was the flu, she was admitted to the ER. The ER informed her she had a UTI and kidney infection. She was sent home with antibiotics which she noted did not relieve her symptoms. She was then admitted to the hospital for the second time for a UTI and kidney infection. 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 No definite shadowing calculus is noted. No right-sided hydronephrosis is note. She was advised to see an urologist, ,and was probably prescribed Fosfomycin for 3 days and Nitrofurantoin 6 weeks.She noted while taking the antibiotic she had a UTI once again. Patient's recent UTI symptoms started one week ago, complained of dysuria. Patient's  noted she is drinking 10 glasses of water and cranberry juice. Patient noted tingling sensation at right anterior thigh.\par 3/26/18: The patient returned for a cystoscopy. \par 4/12/18: The patient returned for a bladder installation. Aside from the installation, the patient was brought back to the room to discuss recent UTI and hospitalization. Noted she was admitted to the hospital from 4/3/18- 4/6/18 for UTI and kidney infection. She is currently taking Cipro for the infection. Urine culture from the hospital grew E.coli Streptococcus. Today urine clear very light yellow.\par 4/19/18: The patient returned for a bladder installation. Noted relief of her bladder symptoms for 2-3 days after first installation. Urine very dilute clear yellow. \par 4/26/18: The patient returned for a bladder installation. Patient has had relief of bladder symptoms from last instillation through today. She and her  are very pleased.\par 5/7/18: The patient returned and reported she is feeling well. Denied any pain from installations. Patient will return for 2 more installations and then installations every other week. \par 5/15/18: The patient returned for a bladder installation. \par 5/24/18: The patient returned for a bladder installation. Noted she had slight pain since last week.\par 6/4/18: The patient returned for a bladder installation. Noted some pain when moving her legs. Patient has agreed to bladder installations every 2 weeks from now on. \par 7/3/18: The patient returned for a bladder installation. Noted she has been having low grade fevers and intermittent mild dysuria. urine was sent for culture today. \par 7/16/18: The patient returned for a bladder installation. Complained of chills this morning but was afebrile. We will check temperature today. I advised the patient to evaluate infection symptoms with her PCP, does not seem urinary.\par 7/23/18: The patient returned for a bladder installation. Patient is going to [place] next week for one week. \par 8/6/18: The patient returned for a cystoscopy and bladder installation. \par 8/13/18: The patient returned for a bladder installation. Noted bladder installation help for 3 days and then gets intermittent dysuria and pain. Noted she recently had a fever of 101 F with chills. Urine culture from 8/6/18 shows no significant growth and UA from 8/6/18 shows no infection. Complained of mid back pain. I will prescribe prednisone but if pain increasingly gets worse she can have a bladder installation if needed. \par 8/29/18: The patient returned and reported she is feeling better since starting prednisone treatment. Noted the pain is a 1/10 now. We will slowly ween off prednisone starting with 10 mg and 5 mg alternating every day. \par 9/17/18: The patient returned and reported she is currently taking 10 mg and 5 mg alternating every day. Noted she takes 10 mg instead of 5 mg if burning is moderate. Noted her pain has resolved significantly. Denied trouble breathing. Patient denied dysuria, gross hematuria, urgency, or incontinence. We will try 5 mg prednisone daily now. \par 10/08/18: The patient returned and reported she has seen significant improvement since starting steroid treatment. Currently taking Prednisone 5 mg and Nitrofurantoin 100 mg daily. Patient is generally feeling well. \par 11/6/18: The patient returned and reported she is experiencing headaches, nausea, and brief sudden periods of burning. Currently taking Prednisone 4 mg daily.  \par 11/29/18: The patient returned and reported that she is currently taking Prednisone 3 mg. Discontinued use of Pantoprazole. Noted she feels intermittent tingling sensation in left LE.\par 1/14/19: The patient returned and reported her bladder feels well and is without pain. Takes two tablets of Prednisone. Recently had pneumonia. I won't decrease the dosage of Prednisone until her pneumonia passes. Once we decrease her dosage of Prednisone, we will evaluate whether or not she should discontinue use of Nitrofurantoin.\par 2/20/19: The patient returned and reported that her bladder still feels well and is without pain. Currently takes one tablet of Prednisone 10 mg.\par 3/25/19: The patient returned and reported that she had itching for 2-3 days since her last visit. Used salt water, which resolved the symptoms. Currently alternates between Prednisone 4 mg and 5 mg. Patient has gained 12 pounds. Noted that she walks for one hour daily. Complained of hair loss and sleeping more than normal, will consult with PCP. Her urination is satisfactory. Denied bladder pain. Complained of burning when she has constipation.\par \par 4/22/19: The patient returned today to discuss bone density results. Bone density - central from 4/1/19 findings were unremarkable. Reported that she has occasionally bladder pain with a severity of 2 or 3/10 that lasts for a couple of seconds. Currently alternates between Prednisone 2 mg and 3 mg. Patient denies dysuria, gross hematuria, urgency, or incontinence. Takes Nitrofurantoin. Denies history of kidney stones, asthma and diabetes. Had pneumonia three months ago and many episodes of bronchitis.

## 2019-04-22 NOTE — ADDENDUM
[FreeTextEntry1] : This note was authored by Salma Hensley working as a scribe for Dr. Chato Judd.\par I, Dr. Chato Judd, have reviewed the content of this note and confirm it is true and accurate. I personally performed the history and physical examination and made all the decisions.\par 4/22/19\par

## 2019-04-24 LAB
APPEARANCE: ABNORMAL
BACTERIA: NEGATIVE
BILIRUBIN URINE: NEGATIVE
BLOOD URINE: NEGATIVE
COLOR: NORMAL
GLUCOSE QUALITATIVE U: NEGATIVE
HYALINE CASTS: 3 /LPF
KETONES URINE: NEGATIVE
LEUKOCYTE ESTERASE URINE: NEGATIVE
MICROSCOPIC-UA: NORMAL
NITRITE URINE: NEGATIVE
PH URINE: 6.5
PROTEIN URINE: NEGATIVE
RED BLOOD CELLS URINE: 1 /HPF
SPECIFIC GRAVITY URINE: 1.01
SQUAMOUS EPITHELIAL CELLS: 17 /HPF
URINE CYTOLOGY: NORMAL
UROBILINOGEN URINE: NORMAL
WHITE BLOOD CELLS URINE: 1 /HPF

## 2019-04-28 LAB — BACTERIA UR CULT: ABNORMAL

## 2019-04-29 ENCOUNTER — RX RENEWAL (OUTPATIENT)
Age: 44
End: 2019-04-29

## 2019-07-18 ENCOUNTER — APPOINTMENT (OUTPATIENT)
Dept: UROLOGY | Facility: CLINIC | Age: 44
End: 2019-07-18
Payer: COMMERCIAL

## 2019-07-18 DIAGNOSIS — R10.9 UNSPECIFIED ABDOMINAL PAIN: ICD-10-CM

## 2019-07-18 PROCEDURE — 99214 OFFICE O/P EST MOD 30 MIN: CPT

## 2019-07-30 PROBLEM — R10.9 ABDOMINAL PAIN IN FEMALE: Status: ACTIVE | Noted: 2018-11-06

## 2019-07-30 NOTE — ADDENDUM
[FreeTextEntry1] : This note was authored by Teodoro Bruno working as scribe for Dr. Chato Judd. I, Dr. Chato Judd, have reviewed the content of this note and confirm it is true and accurate. I personally performed the history and physical examination and made all the decisions.\par 7/18/19

## 2019-07-30 NOTE — PHYSICAL EXAM
[General Appearance - Well Developed] : well developed [General Appearance - Well Nourished] : well nourished [Normal Appearance] : normal appearance [Well Groomed] : well groomed [General Appearance - In No Acute Distress] : no acute distress [Abdomen Soft] : soft [Abdomen Tenderness] : non-tender [Costovertebral Angle Tenderness] : no ~M costovertebral angle tenderness [Skin Color & Pigmentation] : normal skin color and pigmentation [Edema] : no peripheral edema [] : no respiratory distress [Respiration, Rhythm And Depth] : normal respiratory rhythm and effort [Oriented To Time, Place, And Person] : oriented to person, place, and time [Affect] : the affect was normal [Normal Station and Gait] : the gait and station were normal for the patient's age [No Focal Deficits] : no focal deficits [No Palpable Adenopathy] : no palpable adenopathy [Cervical Lymph Nodes Enlarged Posterior Bilaterally] : posterior cervical [Cervical Lymph Nodes Enlarged Anterior Bilaterally] : anterior cervical [Supraclavicular Lymph Nodes Enlarged Bilaterally] : supraclavicular [Axillary Lymph Nodes Enlarged Bilaterally] : axillary [Heart Rate And Rhythm] : Heart rate and rhythm were normal [FreeTextEntry1] : No submandibular gland tenderness.\par

## 2019-07-30 NOTE — HISTORY OF PRESENT ILLNESS
[FreeTextEntry1] : Ms. Barrett is a 44 year old female presented with frequent UTIs. Patient reported UTI symptoms started 6 months ago. 6 months ago she notes she had a fever for 4 days and dysuria which she thought was the flu, she was admitted to the ER. The ER informed her she had a UTI and kidney infection. She was sent home with antibiotics which she noted did not relieve her symptoms. She was then admitted to the hospital for the second time for a UTI and kidney infection. 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 No definite shadowing calculus is noted. No right-sided hydronephrosis is note. She was advised to see an urologist, ,and was probably prescribed Fosfomycin for 3 days and Nitrofurantoin 6 weeks.She noted while taking the antibiotic she had a UTI once again. Patient's recent UTI symptoms started one week ago, complained of dysuria. Patient's  noted she is drinking 10 glasses of water and cranberry juice. Patient noted tingling sensation at right anterior thigh.\par 3/26/18: The patient returned for a cystoscopy. \par 4/12/18: The patient returned for a bladder installation. Aside from the installation, the patient was brought back to the room to discuss recent UTI and hospitalization. Noted she was admitted to the hospital from 4/3/18- 4/6/18 for UTI and kidney infection. She is currently taking Cipro for the infection. Urine culture from the hospital grew E.coli Streptococcus. Today urine clear very light yellow.\par 4/19/18: The patient returned for a bladder installation. Noted relief of her bladder symptoms for 2-3 days after first installation. Urine very dilute clear yellow. \par 4/26/18: The patient returned for a bladder installation. Patient has had relief of bladder symptoms from last instillation through today. She and her  are very pleased.\par 5/7/18: The patient returned and reported she is feeling well. Denied any pain from installations. Patient will return for 2 more installations and then installations every other week. \par 5/15/18: The patient returned for a bladder installation. \par 5/24/18: The patient returned for a bladder installation. Noted she had slight pain since last week.\par 6/4/18: The patient returned for a bladder installation. Noted some pain when moving her legs. Patient has agreed to bladder installations every 2 weeks from now on. \par 7/3/18: The patient returned for a bladder installation. Noted she has been having low grade fevers and intermittent mild dysuria. urine was sent for culture today. \par 7/16/18: The patient returned for a bladder installation. Complained of chills this morning but was afebrile. We will check temperature today. I advised the patient to evaluate infection symptoms with her PCP, does not seem urinary.\par 7/23/18: The patient returned for a bladder installation. Patient is going to [place] next week for one week. \par 8/6/18: The patient returned for a cystoscopy and bladder installation. \par 8/13/18: The patient returned for a bladder installation. Noted bladder installation help for 3 days and then gets intermittent dysuria and pain. Noted she recently had a fever of 101 F with chills. Urine culture from 8/6/18 shows no significant growth and UA from 8/6/18 shows no infection. Complained of mid back pain. I will prescribe prednisone but if pain increasingly gets worse she can have a bladder installation if needed. \par 8/29/18: The patient returned and reported she is feeling better since starting prednisone treatment. Noted the pain is a 1/10 now. We will slowly ween off prednisone starting with 10 mg and 5 mg alternating every day. \par 9/17/18: The patient returned and reported she is currently taking 10 mg and 5 mg alternating every day. Noted she takes 10 mg instead of 5 mg if burning is moderate. Noted her pain has resolved significantly. Denied trouble breathing. Patient denied dysuria, gross hematuria, urgency, or incontinence. We will try 5 mg prednisone daily now. \par 10/08/18: The patient returned and reported she has seen significant improvement since starting steroid treatment. Currently taking Prednisone 5 mg and Nitrofurantoin 100 mg daily. Patient is generally feeling well. \par 11/6/18: The patient returned and reported she is experiencing headaches, nausea, and brief sudden periods of burning. Currently taking Prednisone 4 mg daily.  \par 11/29/18: The patient returned and reported that she is currently taking Prednisone 3 mg. Discontinued use of Pantoprazole. Noted she feels intermittent tingling sensation in left LE.\par 1/14/19: The patient returned and reported her bladder feels well and is without pain. Takes two tablets of Prednisone. Recently had pneumonia. I won't decrease the dosage of Prednisone until her pneumonia passes. Once we decrease her dosage of Prednisone, we will evaluate whether or not she should discontinue use of Nitrofurantoin.\par 2/20/19: The patient returned and reported that her bladder still feels well and is without pain. Currently takes one tablet of Prednisone 10 mg.\par 3/25/19: The patient returned and reported that she had itching for 2-3 days since her last visit. Used salt water, which resolved the symptoms. Currently alternates between Prednisone 4 mg and 5 mg. Patient has gained 12 pounds. Noted that she walks for one hour daily. Complained of hair loss and sleeping more than normal, will consult with PCP. Her urination is satisfactory. Denied bladder pain. Complained of burning when she has constipation.\par 4/22/19: The patient returned today to discuss bone density results. Bone density - central from 4/1/19 findings were unremarkable. Reported that she has occasionally bladder pain with a severity of 2 or 3/10 that lasts for a couple of seconds. Currently alternates between Prednisone 2 mg and 3 mg. Patient denies dysuria, gross hematuria, urgency, or incontinence. Takes Nitrofurantoin. Denies history of kidney stones, asthma and diabetes. Had pneumonia three months ago and many episodes of bronchitis.\par \par 7/18/19: Patient presents today for a follow up. Today she states that the reported bladder pain has significantly decreased since her last visit and that it is almost back to normal. She continues to alternate between Prednisone 2 mg and 3 mg every other day. She will be travelling to [facility] and inquires about continuing her medication and her urinary health.  Biotin 5000 units is taken everyday.

## 2019-07-30 NOTE — REVIEW OF SYSTEMS
[Feeling Poorly] : feeling poorly [Recent Weight Gain (___ Lbs)] : recent [unfilled] ~Ulb weight gain [Nasal Discharge] : nasal discharge [Cough] : cough [Constipation] : constipation [Painful [place]] : painful [place] [Date of last menstrual period ____] : date of last menstrual period: [unfilled] [Seen by urologist before (Name)  ___] : Previously seen by a urologist: [unfilled] [Urine Infection (bladder/kidney)] : bladder/kidney infection [Joint Pain] : joint pain [Itching] : itching [Anxiety] : anxiety [Depression] : depression [Negative] : Heme/Lymph [Fever] : no fever [Feeling Tired] : not feeling tired [Chest Pain] : no chest pain [Diarrhea] : no diarrhea [Heartburn] : no heartburn [Dysuria] : no dysuria [Incontinence] : no incontinence

## 2019-07-30 NOTE — ASSESSMENT
[FreeTextEntry1] : Ms. Barrett is a 44 year old female presented with frequent UTIs. Patient reported UTI symptoms started 6 months ago. 6 months ago she notes she had a fever for 4 days and dysuria which she thought was the flu, she was admitted to the ER. The ER informed her she had a UTI and kidney infection. She was sent home with antibiotics which she noted did not relieve her symptoms. She was then admitted to the hospital for the second time for a UTI and kidney infection. 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 No definite shadowing calculus is noted. No right-sided hydronephrosis is note. She was advised to see an urologist, ,and was probably prescribed Fosfomycin for 3 days and Nitrofurantoin 6 weeks.She noted while taking the antibiotic she had a UTI once again. Patient's recent UTI symptoms started one week ago, complained of dysuria. Patient's  noted she is drinking 10 glasses of water and cranberry juice. Patient noted tingling sensation at right anterior thigh.\par 3/26/18: The patient returned for a cystoscopy. \par 4/12/18: The patient returned for a bladder installation. Aside from the installation, the patient was brought back to the room to discuss recent UTI and hospitalization. Noted she was admitted to the hospital from 4/3/18- 4/6/18 for UTI and kidney infection. She is currently taking Cipro for the infection. Urine culture from the hospital grew E.coli Streptococcus. Today urine clear very light yellow.\par 4/19/18: The patient returned for a bladder installation. Noted relief of her bladder symptoms for 2-3 days after first installation. Urine very dilute clear yellow. \par 4/26/18: The patient returned for a bladder installation. Patient has had relief of bladder symptoms from last instillation through today. She and her  are very pleased.\par 5/7/18: The patient returned and reported she is feeling well. Denied any pain from installations. Patient will return for 2 more installations and then installations every other week. \par 5/15/18: The patient returned for a bladder installation. \par 5/24/18: The patient returned for a bladder installation. Noted she had slight pain since last week.\par 6/4/18: The patient returned for a bladder installation. Noted some pain when moving her legs. Patient has agreed to bladder installations every 2 weeks from now on. \par 7/3/18: The patient returned for a bladder installation. Noted she has been having low grade fevers and intermittent mild dysuria. urine was sent for culture today. \par 7/16/18: The patient returned for a bladder installation. Complained of chills this morning but was afebrile. We will check temperature today. I advised the patient to evaluate infection symptoms with her PCP, does not seem urinary.\par 7/23/18: The patient returned for a bladder installation. Patient is going to [place] next week for one week. \par 8/6/18: The patient returned for a cystoscopy and bladder installation. \par 8/13/18: The patient returned for a bladder installation. Noted bladder installation help for 3 days and then gets intermittent dysuria and pain. Noted she recently had a fever of 101 F with chills. Urine culture from 8/6/18 shows no significant growth and UA from 8/6/18 shows no infection. Complained of mid back pain. I will prescribe prednisone but if pain increasingly gets worse she can have a bladder installation if needed. \par 8/29/18: The patient returned and reported she is feeling better since starting prednisone treatment. Noted the pain is a 1/10 now. We will slowly ween off prednisone starting with 10 mg and 5 mg alternating every day. \par 9/17/18: The patient returned and reported she is currently taking 10 mg and 5 mg alternating every day. Noted she takes 10 mg instead of 5 mg if burning is moderate. Noted her pain has resolved significantly. Denied trouble breathing. Patient denied dysuria, gross hematuria, urgency, or incontinence. We will try 5 mg prednisone daily now. \par 10/08/18: The patient returned and reported she has seen significant improvement since starting steroid treatment. Currently taking Prednisone 5 mg and Nitrofurantoin 100 mg daily. Patient is generally feeling well. \par 11/6/18: The patient returned and reported she is experiencing headaches, nausea, and brief sudden periods of burning. Currently taking Prednisone 4 mg daily.  \par 11/29/18: The patient returned and reported that she is currently taking Prednisone 3 mg. Discontinued use of Pantoprazole. Noted she feels intermittent tingling sensation in left LE.\par 1/14/19: The patient returned and reported her bladder feels well and is without pain. Takes two tablets of Prednisone. Recently had pneumonia. I won't decrease the dosage of Prednisone until her pneumonia passes. Once we decrease her dosage of Prednisone, we will evaluate whether or not she should discontinue use of Nitrofurantoin.\par 2/20/19: The patient returned and reported that her bladder still feels well and is without pain. Currently takes one tablet of Prednisone 10 mg.\par 3/25/19: The patient returned and reported that she had itching for 2-3 days since her last visit. Used salt water, which resolved the symptoms. Currently alternates between Prednisone 4 mg and 5 mg. Patient has gained 12 pounds. Noted that she walks for one hour daily. Complained of hair loss and sleeping more than normal, will consult with PCP. Her urination is satisfactory. Denied bladder pain. Complained of burning when she has constipation.\par 4/22/19: The patient returned today to discuss bone density results. Bone density - central from 4/1/19 findings were unremarkable. Reported that she has occasionally bladder pain with a severity of 2 or 3/10 that lasts for a couple of seconds. Currently alternates between Prednisone 2 mg and 3 mg. Patient denies dysuria, gross hematuria, urgency, or incontinence. Takes Nitrofurantoin. Denies history of kidney stones, asthma and diabetes. Had pneumonia three months ago and many episodes of bronchitis.\par \par 7/18/19: Patient presents today for a follow up. Today she states that the reported bladder pain has significantly decreased since her last visit and that it is almost back to normal. She continues to alternate between Prednisone 2 mg and 3 mg every other day. She will be travelling to [facility] and inquires about continuing her medication and her urinary health. Biotin 5000 units is taken everyday. \par \par The patient produced a urine sample which will be sent for urinalysis, urine cytology and urine culture. \par Blood work today included comprehensive metabolic panel, CBC. \par \par It is advised that while she is in [facility] she is to drink lots of water. Spicy foods should be limited but can be enjoyed if the amount of water consumed is high. \par \par I have renewed the Nitrofurantoin 100 mg today. \par I have also renewed Prednisone 1 mg. She is to continue alternating between 2 mg and 3 mg every other day. \par \par She is to follow up in 6 months or sooner if clinically indicated.

## 2019-09-01 LAB
ALBUMIN SERPL ELPH-MCNC: 4.7 G/DL
ALP BLD-CCNC: 46 U/L
ALT SERPL-CCNC: 21 U/L
ANION GAP SERPL CALC-SCNC: 14 MMOL/L
APPEARANCE: CLEAR
AST SERPL-CCNC: 21 U/L
BACTERIA UR CULT: NORMAL
BACTERIA: NEGATIVE
BASOPHILS # BLD AUTO: 0.03 K/UL
BASOPHILS NFR BLD AUTO: 0.4 %
BILIRUB SERPL-MCNC: 0.2 MG/DL
BILIRUBIN URINE: NEGATIVE
BLOOD URINE: NEGATIVE
BUN SERPL-MCNC: 11 MG/DL
CALCIUM SERPL-MCNC: 9.2 MG/DL
CHLORIDE SERPL-SCNC: 103 MMOL/L
CO2 SERPL-SCNC: 24 MMOL/L
COLOR: NORMAL
CREAT SERPL-MCNC: 0.74 MG/DL
EOSINOPHIL # BLD AUTO: 0.09 K/UL
EOSINOPHIL NFR BLD AUTO: 1.1 %
FUNGUS SPEC CULT ORG #8: NORMAL
GLUCOSE QUALITATIVE U: NEGATIVE
GLUCOSE SERPL-MCNC: 66 MG/DL
HCT VFR BLD CALC: 39.7 %
HGB BLD-MCNC: 12.3 G/DL
HYALINE CASTS: 0 /LPF
IMM GRANULOCYTES NFR BLD AUTO: 0.3 %
KETONES URINE: NEGATIVE
LEUKOCYTE ESTERASE URINE: NEGATIVE
LYMPHOCYTES # BLD AUTO: 2.55 K/UL
LYMPHOCYTES NFR BLD AUTO: 32.2 %
MAN DIFF?: NORMAL
MCHC RBC-ENTMCNC: 28.1 PG
MCHC RBC-ENTMCNC: 31 GM/DL
MCV RBC AUTO: 90.6 FL
MICROSCOPIC-UA: NORMAL
MONOCYTES # BLD AUTO: 0.94 K/UL
MONOCYTES NFR BLD AUTO: 11.9 %
NEUTROPHILS # BLD AUTO: 4.29 K/UL
NEUTROPHILS NFR BLD AUTO: 54.1 %
NITRITE URINE: NEGATIVE
PH URINE: 7
PLATELET # BLD AUTO: 285 K/UL
POTASSIUM SERPL-SCNC: 4.3 MMOL/L
PROT SERPL-MCNC: 7.4 G/DL
PROTEIN URINE: NEGATIVE
RBC # BLD: 4.38 M/UL
RBC # FLD: 13.6 %
RED BLOOD CELLS URINE: 1 /HPF
SODIUM SERPL-SCNC: 140 MMOL/L
SPECIFIC GRAVITY URINE: 1.01
SQUAMOUS EPITHELIAL CELLS: 2 /HPF
URINE CYTOLOGY: NORMAL
UROBILINOGEN URINE: NORMAL
WBC # FLD AUTO: 7.92 K/UL
WHITE BLOOD CELLS URINE: 0 /HPF

## 2019-10-09 NOTE — ED ADULT NURSE NOTE - CHIEF COMPLAINT QUOTE
fever headache cough n/v Quality 226: Preventive Care And Screening: Tobacco Use: Screening And Cessation Intervention: Patient screened for tobacco use and is an ex/non-smoker Quality 110: Preventive Care And Screening: Influenza Immunization: Influenza Immunization Administered during Influenza season Quality 431: Preventive Care And Screening: Unhealthy Alcohol Use - Screening: Patient screened for unhealthy alcohol use using a single question and scores less than 2 times per year Detail Level: Detailed Quality 130: Documentation Of Current Medications In The Medical Record: Current Medications Documented

## 2020-01-30 ENCOUNTER — APPOINTMENT (OUTPATIENT)
Dept: UROLOGY | Facility: CLINIC | Age: 45
End: 2020-01-30
Payer: COMMERCIAL

## 2020-01-30 VITALS
SYSTOLIC BLOOD PRESSURE: 135 MMHG | HEART RATE: 102 BPM | HEIGHT: 66 IN | DIASTOLIC BLOOD PRESSURE: 70 MMHG | BODY MASS INDEX: 34.55 KG/M2 | WEIGHT: 215 LBS

## 2020-01-30 DIAGNOSIS — Z87.440 PERSONAL HISTORY OF URINARY (TRACT) INFECTIONS: ICD-10-CM

## 2020-01-30 PROCEDURE — 99214 OFFICE O/P EST MOD 30 MIN: CPT

## 2020-01-31 LAB
APPEARANCE: CLEAR
BACTERIA: NEGATIVE
BILIRUBIN URINE: NEGATIVE
BLOOD URINE: NEGATIVE
COLOR: NORMAL
GLUCOSE QUALITATIVE U: NEGATIVE
HYALINE CASTS: 0 /LPF
KETONES URINE: NEGATIVE
LEUKOCYTE ESTERASE URINE: NEGATIVE
MICROSCOPIC-UA: NORMAL
NITRITE URINE: NEGATIVE
PH URINE: 7
PROTEIN URINE: NEGATIVE
RED BLOOD CELLS URINE: 0 /HPF
SPECIFIC GRAVITY URINE: 1.01
SQUAMOUS EPITHELIAL CELLS: 1 /HPF
UROBILINOGEN URINE: NORMAL
WHITE BLOOD CELLS URINE: 0 /HPF

## 2020-02-01 LAB — BACTERIA UR CULT: NORMAL

## 2020-02-01 NOTE — ASSESSMENT
[FreeTextEntry1] : Ms. Barrett is a 45 year old female presented with frequent UTIs. Patient reported UTI symptoms started 6 months ago. 6 months ago she notes she had a fever for 4 days and dysuria which she thought was the flu, she was admitted to the ER. The ER informed her she had a UTI and kidney infection. She was sent home with antibiotics which she noted did not relieve her symptoms. She was then admitted to the hospital for the second time for a UTI and kidney infection. 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 No definite shadowing calculus is noted. No right-sided hydronephrosis is note. She was advised to see an urologist, ,and was probably prescribed Fosfomycin for 3 days and Nitrofurantoin 6 weeks.She noted while taking the antibiotic she had a UTI once again. Patient's recent UTI symptoms started one week ago, complained of dysuria. Patient's  noted she is drinking 10 glasses of water and cranberry juice. Patient noted tingling sensation at right anterior thigh.\par 3/26/18: The patient returned for a cystoscopy. \par 4/12/18: The patient returned for a bladder installation. Aside from the installation, the patient was brought back to the room to discuss recent UTI and hospitalization. Noted she was admitted to the hospital from 4/3/18- 4/6/18 for UTI and kidney infection. She is currently taking Cipro for the infection. Urine culture from the hospital grew E.coli Streptococcus. Today urine clear very light yellow.\par 4/19/18: The patient returned for a bladder installation. Noted relief of her bladder symptoms for 2-3 days after first installation. Urine very dilute clear yellow. \par 4/26/18: The patient returned for a bladder installation. Patient has had relief of bladder symptoms from last instillation through today. She and her  are very pleased.\par 5/7/18: The patient returned and reported she is feeling well. Denied any pain from installations. Patient will return for 2 more installations and then installations every other week. \par 5/15/18: The patient returned for a bladder installation. \par 5/24/18: The patient returned for a bladder installation. Noted she had slight pain since last week.\par 6/4/18: The patient returned for a bladder installation. Noted some pain when moving her legs. Patient has agreed to bladder installations every 2 weeks from now on. \par 7/3/18: The patient returned for a bladder installation. Noted she has been having low grade fevers and intermittent mild dysuria. urine was sent for culture today. \par 7/16/18: The patient returned for a bladder installation. Complained of chills this morning but was afebrile. We will check temperature today. I advised the patient to evaluate infection symptoms with her PCP, does not seem urinary.\par 7/23/18: The patient returned for a bladder installation. Patient is going to [place] next week for one week. \par 8/6/18: The patient returned for a cystoscopy and bladder installation. \par 8/13/18: The patient returned for a bladder installation. Noted bladder installation help for 3 days and then gets intermittent dysuria and pain. Noted she recently had a fever of 101 F with chills. Urine culture from 8/6/18 shows no significant growth and UA from 8/6/18 shows no infection. Complained of mid back pain. I will prescribe prednisone but if pain increasingly gets worse she can have a bladder installation if needed. \par 8/29/18: The patient returned and reported she is feeling better since starting prednisone treatment. Noted the pain is a 1/10 now. We will slowly ween off prednisone starting with 10 mg and 5 mg alternating every day. \par 9/17/18: The patient returned and reported she is currently taking 10 mg and 5 mg alternating every day. Noted she takes 10 mg instead of 5 mg if burning is moderate. Noted her pain has resolved significantly. Denied trouble breathing. Patient denied dysuria, gross hematuria, urgency, or incontinence. We will try 5 mg prednisone daily now. \par 10/08/18: The patient returned and reported she has seen significant improvement since starting steroid treatment. Currently taking Prednisone 5 mg and Nitrofurantoin 100 mg daily. Patient is generally feeling well. \par 11/6/18: The patient returned and reported she is experiencing headaches, nausea, and brief sudden periods of burning. Currently taking Prednisone 4 mg daily.  \par 11/29/18: The patient returned and reported that she is currently taking Prednisone 3 mg. Discontinued use of Pantoprazole. Noted she feels intermittent tingling sensation in left LE.\par 1/14/19: The patient returned and reported her bladder feels well and is without pain. Takes two tablets of Prednisone. Recently had pneumonia. I won't decrease the dosage of Prednisone until her pneumonia passes. Once we decrease her dosage of Prednisone, we will evaluate whether or not she should discontinue use of Nitrofurantoin.\par 2/20/19: The patient returned and reported that her bladder still feels well and is without pain. Currently takes one tablet of Prednisone 10 mg.\par 3/25/19: The patient returned and reported that she had itching for 2-3 days since her last visit. Used salt water, which resolved the symptoms. Currently alternates between Prednisone 4 mg and 5 mg. Patient has gained 12 pounds. Noted that she walks for one hour daily. Complained of hair loss and sleeping more than normal, will consult with PCP. Her urination is satisfactory. Denied bladder pain. Complained of burning when she has constipation.\par 4/22/19: The patient returned today to discuss bone density results. Bone density - central from 4/1/19 findings were unremarkable. Reported that she has occasionally bladder pain with a severity of 2 or 3/10 that lasts for a couple of seconds. Currently alternates between Prednisone 2 mg and 3 mg. Patient denies dysuria, gross hematuria, urgency, or incontinence. Takes Nitrofurantoin. Denies history of kidney stones, asthma and diabetes. Had pneumonia three months ago and many episodes of bronchitis.\par 7/18/19: Patient presents today for a follow up. Today she states that the reported bladder pain has significantly decreased since her last visit and that it is almost back to normal. She continues to alternate between Prednisone 2 mg and 3 mg every other day. She will be travelling to [facility] and inquires about continuing her medication and her urinary health. Biotin 5000 units is taken everyday. \par \par 01/30/2020: Patient presents for follow up for recurrent UTIs. She reports she started noticing bladder pain and dysuria 3 days ago after she miss 2 days of nitrofurantoin few days earlier.  Reports when she took cranberry in past it helped. No hematuria, no frequency, no urgency, no hesitancy, no straining. No incontinence. No fevers, no chills, no nausea, no vomiting, no flank pain.\par \par Blood work today included comprehensive metabolic panel, and CBC. \par \par Advised to restart cranberry tablet daily. \par \par Pt will provide a urine sample today for culture and analysis. \par \par Renewed Nitrofurantoin. Pt will hold the Nitrofurantoin for now. She will start Bactrim for 10 days, then resume Nitrofurantoin. \par \par Continue Prednisone for now. Dose will be lowered after pt's symptoms have stabilized. \par \par Pt will have US renal and RTO in 4 weeks for results and f/u

## 2020-02-01 NOTE — REVIEW OF SYSTEMS
[Feeling Poorly] : feeling poorly [Nasal Discharge] : nasal discharge [see HPI] : see HPI [Date of last menstrual period ____] : date of last menstrual period: [unfilled] [Painful [place]] : painful [place] [Seen by urologist before (Name)  ___] : Previously seen by a urologist: [unfilled] [Urine Infection (bladder/kidney)] : bladder/kidney infection [Joint Pain] : joint pain [Depression] : depression [Negative] : Endocrine [Fever] : no fever [Feeling Tired] : not feeling tired [Chest Pain] : no chest pain [Dysuria] : no dysuria [Heartburn] : no heartburn [Diarrhea] : no diarrhea [Incontinence] : no incontinence

## 2020-02-01 NOTE — HISTORY OF PRESENT ILLNESS
[FreeTextEntry1] : Ms. Barrett is a 45 year old female presented with frequent UTIs. Patient reported UTI symptoms started 6 months ago. 6 months ago she notes she had a fever for 4 days and dysuria which she thought was the flu, she was admitted to the ER. The ER informed her she had a UTI and kidney infection. She was sent home with antibiotics which she noted did not relieve her symptoms. She was then admitted to the hospital for the second time for a UTI and kidney infection. 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 No definite shadowing calculus is noted. No right-sided hydronephrosis is note. She was advised to see an urologist, ,and was probably prescribed Fosfomycin for 3 days and Nitrofurantoin 6 weeks.She noted while taking the antibiotic she had a UTI once again. Patient's recent UTI symptoms started one week ago, complained of dysuria. Patient's  noted she is drinking 10 glasses of water and cranberry juice. Patient noted tingling sensation at right anterior thigh.\par 3/26/18: The patient returned for a cystoscopy. \par 4/12/18: The patient returned for a bladder installation. Aside from the installation, the patient was brought back to the room to discuss recent UTI and hospitalization. Noted she was admitted to the hospital from 4/3/18- 4/6/18 for UTI and kidney infection. She is currently taking Cipro for the infection. Urine culture from the hospital grew E.coli Streptococcus. Today urine clear very light yellow.\par 4/19/18: The patient returned for a bladder installation. Noted relief of her bladder symptoms for 2-3 days after first installation. Urine very dilute clear yellow. \par 4/26/18: The patient returned for a bladder installation. Patient has had relief of bladder symptoms from last instillation through today. She and her  are very pleased.\par 5/7/18: The patient returned and reported she is feeling well. Denied any pain from installations. Patient will return for 2 more installations and then installations every other week. \par 5/15/18: The patient returned for a bladder installation. \par 5/24/18: The patient returned for a bladder installation. Noted she had slight pain since last week.\par 6/4/18: The patient returned for a bladder installation. Noted some pain when moving her legs. Patient has agreed to bladder installations every 2 weeks from now on. \par 7/3/18: The patient returned for a bladder installation. Noted she has been having low grade fevers and intermittent mild dysuria. urine was sent for culture today. \par 7/16/18: The patient returned for a bladder installation. Complained of chills this morning but was afebrile. We will check temperature today. I advised the patient to evaluate infection symptoms with her PCP, does not seem urinary.\par 7/23/18: The patient returned for a bladder installation. Patient is going to [place] next week for one week. \par 8/6/18: The patient returned for a cystoscopy and bladder installation. \par 8/13/18: The patient returned for a bladder installation. Noted bladder installation help for 3 days and then gets intermittent dysuria and pain. Noted she recently had a fever of 101 F with chills. Urine culture from 8/6/18 shows no significant growth and UA from 8/6/18 shows no infection. Complained of mid back pain. I will prescribe prednisone but if pain increasingly gets worse she can have a bladder installation if needed. \par 8/29/18: The patient returned and reported she is feeling better since starting prednisone treatment. Noted the pain is a 1/10 now. We will slowly ween off prednisone starting with 10 mg and 5 mg alternating every day. \par 9/17/18: The patient returned and reported she is currently taking 10 mg and 5 mg alternating every day. Noted she takes 10 mg instead of 5 mg if burning is moderate. Noted her pain has resolved significantly. Denied trouble breathing. Patient denied dysuria, gross hematuria, urgency, or incontinence. We will try 5 mg prednisone daily now. \par 10/08/18: The patient returned and reported she has seen significant improvement since starting steroid treatment. Currently taking Prednisone 5 mg and Nitrofurantoin 100 mg daily. Patient is generally feeling well. \par 11/6/18: The patient returned and reported she is experiencing headaches, nausea, and brief sudden periods of burning. Currently taking Prednisone 4 mg daily.  \par 11/29/18: The patient returned and reported that she is currently taking Prednisone 3 mg. Discontinued use of Pantoprazole. Noted she feels intermittent tingling sensation in left LE.\par 1/14/19: The patient returned and reported her bladder feels well and is without pain. Takes two tablets of Prednisone. Recently had pneumonia. I won't decrease the dosage of Prednisone until her pneumonia passes. Once we decrease her dosage of Prednisone, we will evaluate whether or not she should discontinue use of Nitrofurantoin.\par 2/20/19: The patient returned and reported that her bladder still feels well and is without pain. Currently takes one tablet of Prednisone 10 mg.\par 3/25/19: The patient returned and reported that she had itching for 2-3 days since her last visit. Used salt water, which resolved the symptoms. Currently alternates between Prednisone 4 mg and 5 mg. Patient has gained 12 pounds. Noted that she walks for one hour daily. Complained of hair loss and sleeping more than normal, will consult with PCP. Her urination is satisfactory. Denied bladder pain. Complained of burning when she has constipation.\par 4/22/19: The patient returned today to discuss bone density results. Bone density - central from 4/1/19 findings were unremarkable. Reported that she has occasionally bladder pain with a severity of 2 or 3/10 that lasts for a couple of seconds. Currently alternates between Prednisone 2 mg and 3 mg. Patient denies dysuria, gross hematuria, urgency, or incontinence. Takes Nitrofurantoin. Denies history of kidney stones, asthma and diabetes. Had pneumonia three months ago and many episodes of bronchitis.\par 7/18/19: Patient presents today for a follow up. Today she states that the reported bladder pain has significantly decreased since her last visit and that it is almost back to normal. She continues to alternate between Prednisone 2 mg and 3 mg every other day. She will be travelling to [facility] and inquires about continuing her medication and her urinary health.  Biotin 5000 units is taken everyday. \par \par 01/30/2020: Patient presents for follow up for recurrent UTIs. She reports she started noticing bladder pain and dysuria 3 days ago after she miss 2 days of nitrofurantoin few days earlier.  Reports when she took cranberry in past it helped\par No hematuria, no frequency, no urgency, no hesitancy, no straining. No incontinence. No fevers, no chills, no nausea, no vomiting, no flank pain.\par \par \par

## 2020-02-01 NOTE — ADDENDUM
[FreeTextEntry1] : This note was authored by Yasemin Chun working as scribe for Dr. Chato Judd. I, Dr. Chato Judd, have reviewed the content of this note and confirm it is true and accurate. I personally performed the history and physical examination and made all the decisions.\par 01/30/2020

## 2020-02-01 NOTE — PHYSICAL EXAM
[General Appearance - Well Developed] : well developed [General Appearance - Well Nourished] : well nourished [Normal Appearance] : normal appearance [Well Groomed] : well groomed [Abdomen Soft] : soft [General Appearance - In No Acute Distress] : no acute distress [Costovertebral Angle Tenderness] : no ~M costovertebral angle tenderness [Abdomen Tenderness] : non-tender [Heart Rate And Rhythm] : Heart rate and rhythm were normal [Skin Color & Pigmentation] : normal skin color and pigmentation [Edema] : no peripheral edema [] : no respiratory distress [Respiration, Rhythm And Depth] : normal respiratory rhythm and effort [Oriented To Time, Place, And Person] : oriented to person, place, and time [Affect] : the affect was normal [No Focal Deficits] : no focal deficits [Normal Station and Gait] : the gait and station were normal for the patient's age [No Palpable Adenopathy] : no palpable adenopathy [Supraclavicular Lymph Nodes Enlarged Bilaterally] : supraclavicular [Cervical Lymph Nodes Enlarged Posterior Bilaterally] : posterior cervical [Cervical Lymph Nodes Enlarged Anterior Bilaterally] : anterior cervical [Axillary Lymph Nodes Enlarged Bilaterally] : axillary [FreeTextEntry1] : no sternal tenderness. no tibia, distal femur, or knee tenderness.

## 2020-02-04 LAB — URINE CYTOLOGY: NORMAL

## 2020-02-16 LAB
APPEARANCE: CLEAR
BACTERIA UR CULT: NORMAL
BACTERIA: NEGATIVE
BILIRUBIN URINE: NEGATIVE
BLOOD URINE: NEGATIVE
COLOR: COLORLESS
GLUCOSE QUALITATIVE U: NEGATIVE
HYALINE CASTS: 1 /LPF
KETONES URINE: NEGATIVE
LEUKOCYTE ESTERASE URINE: NEGATIVE
MICROSCOPIC-UA: NORMAL
NITRITE URINE: NEGATIVE
PH URINE: 7
PROTEIN URINE: NEGATIVE
RED BLOOD CELLS URINE: 1 /HPF
SPECIFIC GRAVITY URINE: 1
SQUAMOUS EPITHELIAL CELLS: 1 /HPF
URINE CYTOLOGY: NORMAL
UROBILINOGEN URINE: NORMAL
WHITE BLOOD CELLS URINE: 0 /HPF

## 2020-02-21 NOTE — ED PROVIDER NOTE - CPE EDP HEAD NORM PED
Left message for follow up call, asking to call with concerns or questions or follow up with PCP.     Head atraumatic, normal cephalic shape.

## 2020-02-24 ENCOUNTER — APPOINTMENT (OUTPATIENT)
Dept: UROLOGY | Facility: CLINIC | Age: 45
End: 2020-02-24
Payer: COMMERCIAL

## 2020-02-24 ENCOUNTER — OUTPATIENT (OUTPATIENT)
Dept: OUTPATIENT SERVICES | Facility: HOSPITAL | Age: 45
LOS: 1 days | End: 2020-02-24
Payer: COMMERCIAL

## 2020-02-24 DIAGNOSIS — K29.70 GASTRITIS, UNSPECIFIED, W/OUT BLEEDING: ICD-10-CM

## 2020-02-24 DIAGNOSIS — R39.9 UNSPECIFIED SYMPTOMS AND SIGNS INVOLVING THE GENITOURINARY SYSTEM: Chronic | ICD-10-CM

## 2020-02-24 DIAGNOSIS — Z98.891 HISTORY OF UTERINE SCAR FROM PREVIOUS SURGERY: Chronic | ICD-10-CM

## 2020-02-24 DIAGNOSIS — R35.0 FREQUENCY OF MICTURITION: ICD-10-CM

## 2020-02-24 DIAGNOSIS — R11.0 NAUSEA: ICD-10-CM

## 2020-02-24 PROCEDURE — 76775 US EXAM ABDO BACK WALL LIM: CPT | Mod: 26

## 2020-02-24 PROCEDURE — 76775 US EXAM ABDO BACK WALL LIM: CPT

## 2020-02-24 PROCEDURE — 99214 OFFICE O/P EST MOD 30 MIN: CPT | Mod: 25

## 2020-02-26 PROBLEM — R11.0 NAUSEA: Status: ACTIVE | Noted: 2018-11-06

## 2020-02-26 PROBLEM — K29.70 GASTRITIS: Status: ACTIVE | Noted: 2020-02-26

## 2020-02-26 NOTE — PHYSICAL EXAM
[General Appearance - Well Developed] : well developed [General Appearance - Well Nourished] : well nourished [Normal Appearance] : normal appearance [Well Groomed] : well groomed [General Appearance - In No Acute Distress] : no acute distress [Abdomen Soft] : soft [Abdomen Tenderness] : non-tender [Costovertebral Angle Tenderness] : no ~M costovertebral angle tenderness [Skin Color & Pigmentation] : normal skin color and pigmentation [Heart Rate And Rhythm] : Heart rate and rhythm were normal [Edema] : no peripheral edema [Respiration, Rhythm And Depth] : normal respiratory rhythm and effort [] : no respiratory distress [Oriented To Time, Place, And Person] : oriented to person, place, and time [Affect] : the affect was normal [Normal Station and Gait] : the gait and station were normal for the patient's age [No Focal Deficits] : no focal deficits [No Palpable Adenopathy] : no palpable adenopathy [Cervical Lymph Nodes Enlarged Posterior Bilaterally] : posterior cervical [Cervical Lymph Nodes Enlarged Anterior Bilaterally] : anterior cervical [Supraclavicular Lymph Nodes Enlarged Bilaterally] : supraclavicular [Axillary Lymph Nodes Enlarged Bilaterally] : axillary [FreeTextEntry1] : Smile Symmetric. Tongue is Midline. hand  5/5 bilaterally. \par hand  5/5 bilaterally\par

## 2020-02-26 NOTE — ASSESSMENT
[FreeTextEntry1] : 2/24/20: Patient presents today for an ultrasound. Pt reports dysuria.  Ultrasound shows 7.0 x 5.0 mm hyperechoic focus mid pole, appears fatty. Does not have the characteristics of an AML or stone. No evidence of stones bilaterally. Both kidneys are normal in size and echogenicity without hydronephrosis or solid masses visualized. Urine culture from two weeks ago was normal. Pt had upper endoscopy recently which showed gastritis. \par \par Start Zantac for patients recent nausea, loss of appetite since starting prednisone. \par Increased Prednisone to 1 tablet 4 times daily. \par \par RTC in 1month. \par

## 2020-02-26 NOTE — HISTORY OF PRESENT ILLNESS
[FreeTextEntry1] : Ms. Barrett is a 45 year old female presented with frequent UTIs. Patient reported UTI symptoms started 6 months ago. 6 months ago she notes she had a fever for 4 days and dysuria which she thought was the flu, she was admitted to the ER. The ER informed her she had a UTI and kidney infection. She was sent home with antibiotics which she noted did not relieve her symptoms. She was then admitted to the hospital for the second time for a UTI and kidney infection. 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 No definite shadowing calculus is noted. No right-sided hydronephrosis is note. She was advised to see an urologist, ,and was probably prescribed Fosfomycin for 3 days and Nitrofurantoin 6 weeks.She noted while taking the antibiotic she had a UTI once again. Patient's recent UTI symptoms started one week ago, complained of dysuria. Patient's  noted she is drinking 10 glasses of water and cranberry juice. Patient noted tingling sensation at right anterior thigh.\par 3/26/18: The patient returned for a cystoscopy. \par 4/12/18: The patient returned for a bladder installation. Aside from the installation, the patient was brought back to the room to discuss recent UTI and hospitalization. Noted she was admitted to the hospital from 4/3/18- 4/6/18 for UTI and kidney infection. She is currently taking Cipro for the infection. Urine culture from the hospital grew E.coli Streptococcus. Today urine clear very light yellow.\par 4/19/18: The patient returned for a bladder installation. Noted relief of her bladder symptoms for 2-3 days after first installation. Urine very dilute clear yellow. \par 4/26/18: The patient returned for a bladder installation. Patient has had relief of bladder symptoms from last instillation through today. She and her  are very pleased.\par 5/7/18: The patient returned and reported she is feeling well. Denied any pain from installations. Patient will return for 2 more installations and then installations every other week. \par 5/15/18: The patient returned for a bladder installation. \par 5/24/18: The patient returned for a bladder installation. Noted she had slight pain since last week.\par 6/4/18: The patient returned for a bladder installation. Noted some pain when moving her legs. Patient has agreed to bladder installations every 2 weeks from now on. \par 7/3/18: The patient returned for a bladder installation. Noted she has been having low grade fevers and intermittent mild dysuria. urine was sent for culture today. \par 7/16/18: The patient returned for a bladder installation. Complained of chills this morning but was afebrile. We will check temperature today. I advised the patient to evaluate infection symptoms with her PCP, does not seem urinary.\par 7/23/18: The patient returned for a bladder installation. Patient is going to [place] next week for one week. \par 8/6/18: The patient returned for a cystoscopy and bladder installation. \par 8/13/18: The patient returned for a bladder installation. Noted bladder installation help for 3 days and then gets intermittent dysuria and pain. Noted she recently had a fever of 101 F with chills. Urine culture from 8/6/18 shows no significant growth and UA from 8/6/18 shows no infection. Complained of mid back pain. I will prescribe prednisone but if pain increasingly gets worse she can have a bladder installation if needed. \par 8/29/18: The patient returned and reported she is feeling better since starting prednisone treatment. Noted the pain is a 1/10 now. We will slowly ween off prednisone starting with 10 mg and 5 mg alternating every day. \par 9/17/18: The patient returned and reported she is currently taking 10 mg and 5 mg alternating every day. Noted she takes 10 mg instead of 5 mg if burning is moderate. Noted her pain has resolved significantly. Denied trouble breathing. Patient denied dysuria, gross hematuria, urgency, or incontinence. We will try 5 mg prednisone daily now. \par 10/08/18: The patient returned and reported she has seen significant improvement since starting steroid treatment. Currently taking Prednisone 5 mg and Nitrofurantoin 100 mg daily. Patient is generally feeling well. \par 11/6/18: The patient returned and reported she is experiencing headaches, nausea, and brief sudden periods of burning. Currently taking Prednisone 4 mg daily.  \par 11/29/18: The patient returned and reported that she is currently taking Prednisone 3 mg. Discontinued use of Pantoprazole. Noted she feels intermittent tingling sensation in left LE.\par 1/14/19: The patient returned and reported her bladder feels well and is without pain. Takes two tablets of Prednisone. Recently had pneumonia. I won't decrease the dosage of Prednisone until her pneumonia passes. Once we decrease her dosage of Prednisone, we will evaluate whether or not she should discontinue use of Nitrofurantoin.\par 2/20/19: The patient returned and reported that her bladder still feels well and is without pain. Currently takes one tablet of Prednisone 10 mg.\par 3/25/19: The patient returned and reported that she had itching for 2-3 days since her last visit. Used salt water, which resolved the symptoms. Currently alternates between Prednisone 4 mg and 5 mg. Patient has gained 12 pounds. Noted that she walks for one hour daily. Complained of hair loss and sleeping more than normal, will consult with PCP. Her urination is satisfactory. Denied bladder pain. Complained of burning when she has constipation.\par 4/22/19: The patient returned today to discuss bone density results. Bone density - central from 4/1/19 findings were unremarkable. Reported that she has occasionally bladder pain with a severity of 2 or 3/10 that lasts for a couple of seconds. Currently alternates between Prednisone 2 mg and 3 mg. Patient denies dysuria, gross hematuria, urgency, or incontinence. Takes Nitrofurantoin. Denies history of kidney stones, asthma and diabetes. Had pneumonia three months ago and many episodes of bronchitis.\par 7/18/19: Patient presents today for a follow up. Today she states that the reported bladder pain has significantly decreased since her last visit and that it is almost back to normal. She continues to alternate between Prednisone 2 mg and 3 mg every other day. She will be travelling to [facility] and inquires about continuing her medication and her urinary health.  Biotin 5000 units is taken everyday. \par 01/30/2020: Patient presents for follow up for recurrent UTIs. She reports she started noticing bladder pain and dysuria 3 days ago after she miss 2 days of nitrofurantoin few days earlier.  Reports when she took cranberry in past it helped\par No hematuria, no frequency, no urgency, no hesitancy, no straining. No incontinence. No fevers, no chills, no nausea, no vomiting, no flank pain.\par \par 2/24/20: Patient presents today for an ultrasound. Pt reports dysuria.  Ultrasound shows 7.0 x 5.0 mm hyperechoic focus mid pole, appears fatty. Does not have the characteristics of an AML or stone. No evidence of stones bilaterally. Both kidneys are normal in size and echogenicity without hydronephrosis or solid masses visualized. Urine culture from two weeks ago was normal. Pt had upper endoscopy recently which showed gastritis. \par \par

## 2020-02-26 NOTE — ADDENDUM
[FreeTextEntry1] : This note was authored by Bren Garcia working as a scribe for Dr. Chato Judd.\par \par I, Dr. Chato Judd, have reviewed the content of this note and confirm it is true and accurate. I personally performed the history and physical examination and made all the decisions.\par

## 2020-02-26 NOTE — REVIEW OF SYSTEMS
[Feeling Poorly] : feeling poorly [Nasal Discharge] : nasal discharge [see HPI] : see HPI [Painful Hideaway] : painful Hideaway [Date of last menstrual period ____] : date of last menstrual period: [unfilled] [Seen by urologist before (Name)  ___] : Previously seen by a urologist: [unfilled] [Urine Infection (bladder/kidney)] : bladder/kidney infection [Joint Pain] : joint pain [Depression] : depression [Negative] : Heme/Lymph [Fever] : no fever [Feeling Tired] : not feeling tired [Heartburn] : no heartburn [Diarrhea] : no diarrhea [Chest Pain] : no chest pain [Incontinence] : no incontinence [Dysuria] : no dysuria

## 2020-03-04 DIAGNOSIS — R11.0 NAUSEA: ICD-10-CM

## 2020-03-04 DIAGNOSIS — R39.89 OTHER SYMPTOMS AND SIGNS INVOLVING THE GENITOURINARY SYSTEM: ICD-10-CM

## 2020-03-04 DIAGNOSIS — N12 TUBULO-INTERSTITIAL NEPHRITIS, NOT SPECIFIED AS ACUTE OR CHRONIC: ICD-10-CM

## 2020-03-24 ENCOUNTER — APPOINTMENT (OUTPATIENT)
Dept: UROLOGY | Facility: CLINIC | Age: 45
End: 2020-03-24

## 2020-05-14 ENCOUNTER — RX RENEWAL (OUTPATIENT)
Age: 45
End: 2020-05-14

## 2020-12-16 PROBLEM — Z87.42 HISTORY OF VAGINITIS: Status: RESOLVED | Noted: 2018-04-12 | Resolved: 2020-12-16

## 2020-12-23 PROBLEM — Z87.440 HISTORY OF URINARY TRACT INFECTION: Status: RESOLVED | Noted: 2018-07-03 | Resolved: 2020-12-23

## 2021-01-21 ENCOUNTER — APPOINTMENT (OUTPATIENT)
Dept: UROLOGY | Facility: CLINIC | Age: 46
End: 2021-01-21

## 2021-01-27 ENCOUNTER — APPOINTMENT (OUTPATIENT)
Dept: UROLOGY | Facility: CLINIC | Age: 46
End: 2021-01-27
Payer: COMMERCIAL

## 2021-01-27 VITALS
RESPIRATION RATE: 17 BRPM | TEMPERATURE: 97.5 F | WEIGHT: 215 LBS | HEART RATE: 73 BPM | DIASTOLIC BLOOD PRESSURE: 79 MMHG | SYSTOLIC BLOOD PRESSURE: 117 MMHG | BODY MASS INDEX: 34.55 KG/M2 | HEIGHT: 66 IN

## 2021-01-27 DIAGNOSIS — N12 TUBULO-INTERSTITIAL NEPHRITIS, NOT SPECIFIED AS ACUTE OR CHRONIC: ICD-10-CM

## 2021-01-27 PROCEDURE — 99214 OFFICE O/P EST MOD 30 MIN: CPT

## 2021-01-27 PROCEDURE — 99072 ADDL SUPL MATRL&STAF TM PHE: CPT

## 2021-01-27 NOTE — REASON FOR VISIT
has a normal mood and affect. His behavior is normal.   Blood pressure 132/74, pulse 64, resp. rate 14, height 6' (1.829 m), weight 201 lb 6 oz (91.3 kg). Results for orders placed or performed in visit on 10/31/18   POCT occult blood stool   Result Value Ref Range    OCCULT BLOOD FECAL      OCCULT BLOOD FECAL      OCCULT BLOOD FECAL       negative    Assessment:       Diagnosis Orders   1.  Essential hypertension             Plan:      See me in February   Let me know if the swelling worsens        Ramon Arroyo MD [Follow-up Visit ___] : a follow-up visit  for [unfilled] [Spouse] : spouse

## 2021-01-31 LAB
APPEARANCE: CLEAR
BACTERIA UR CULT: NORMAL
BACTERIA: NEGATIVE
BILIRUBIN URINE: NEGATIVE
BLOOD URINE: NEGATIVE
COLOR: COLORLESS
GLUCOSE QUALITATIVE U: NEGATIVE
HYALINE CASTS: 1 /LPF
KETONES URINE: NEGATIVE
LEUKOCYTE ESTERASE URINE: NEGATIVE
MICROSCOPIC-UA: NORMAL
NITRITE URINE: NEGATIVE
PH URINE: 6.5
PROTEIN URINE: NEGATIVE
RED BLOOD CELLS URINE: 0 /HPF
SPECIFIC GRAVITY URINE: 1
SQUAMOUS EPITHELIAL CELLS: 1 /HPF
URINE CYTOLOGY: NORMAL
UROBILINOGEN URINE: NORMAL
WHITE BLOOD CELLS URINE: 2 /HPF

## 2021-02-01 NOTE — ADDENDUM
[FreeTextEntry1] : I, Ibeth Kingin, acted solely as a scribe for Dr. Chato Judd on this date 01/27/2021.\par \par All medical record entries made by the Scribe were at my, Dr. Chato Judd, direction and personally dictated by me on 01/27/2021. I have reviewed the chart and agree that the record accurately reflects my personal performance of the history, physical exam, assessment and plan.  I have also personally directed, reviewed and agreed with the chart.

## 2021-02-01 NOTE — REVIEW OF SYSTEMS
[Feeling Poorly] : feeling poorly [Nasal Discharge] : nasal discharge [see HPI] : see HPI [Painful Carnation] : painful Carnation [Date of last menstrual period ____] : date of last menstrual period: [unfilled] [Seen by urologist before (Name)  ___] : Previously seen by a urologist: [unfilled] [Urine Infection (bladder/kidney)] : bladder/kidney infection [Joint Pain] : joint pain [Depression] : depression [Negative] : Heme/Lymph [Fever] : no fever [Feeling Tired] : not feeling tired [Chest Pain] : no chest pain [Diarrhea] : no diarrhea [Heartburn] : no heartburn [Dysuria] : no dysuria [Incontinence] : no incontinence

## 2021-02-01 NOTE — ASSESSMENT
[FreeTextEntry1] : Ms. Barrett is a 46 year old female presented with frequent UTIs. Patient reported UTI symptoms started 6 months ago. 6 months ago she notes she had a fever for 4 days and dysuria which she thought was the flu, she was admitted to the ER. The ER informed her she had a UTI and kidney infection. She was sent home with antibiotics which she noted did not relieve her symptoms. She was then admitted to the hospital for the second time for a UTI and kidney infection. 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 No definite shadowing calculus is noted. No right-sided hydronephrosis is note. She was advised to see an urologist, ,and was probably prescribed Fosfomycin for 3 days and Nitrofurantoin 6 weeks.She noted while taking the antibiotic she had a UTI once again. Patient's recent UTI symptoms started one week ago, complained of dysuria. Patient's  noted she is drinking 10 glasses of water and cranberry juice. Patient noted tingling sensation at right anterior thigh.\par 3/26/18: The patient returned for a cystoscopy. \par 4/12/18: The patient returned for a bladder installation. Aside from the installation, the patient was brought back to the room to discuss recent UTI and hospitalization. Noted she was admitted to the hospital from 4/3/18- 4/6/18 for UTI and kidney infection. She is currently taking Cipro for the infection. Urine culture from the hospital grew E.coli Streptococcus. Today urine clear very light yellow.\par 4/19/18: The patient returned for a bladder installation. Noted relief of her bladder symptoms for 2-3 days after first installation. Urine very dilute clear yellow. \par 4/26/18: The patient returned for a bladder installation. Patient has had relief of bladder symptoms from last instillation through today. She and her  are very pleased.\par 5/7/18: The patient returned and reported she is feeling well. Denied any pain from installations. Patient will return for 2 more installations and then installations every other week. \par 5/15/18: The patient returned for a bladder installation. \par 5/24/18: The patient returned for a bladder installation. Noted she had slight pain since last week.\par 6/4/18: The patient returned for a bladder installation. Noted some pain when moving her legs. Patient has agreed to bladder installations every 2 weeks from now on. \par 7/3/18: The patient returned for a bladder installation. Noted she has been having low grade fevers and intermittent mild dysuria. urine was sent for culture today. \par 7/16/18: The patient returned for a bladder installation. Complained of chills this morning but was afebrile. We will check temperature today. I advised the patient to evaluate infection symptoms with her PCP, does not seem urinary.\par 7/23/18: The patient returned for a bladder installation. Patient is going to [place] next week for one week. \par 8/6/18: The patient returned for a cystoscopy and bladder installation. \par 8/13/18: The patient returned for a bladder installation. Noted bladder installation help for 3 days and then gets intermittent dysuria and pain. Noted she recently had a fever of 101 F with chills. Urine culture from 8/6/18 shows no significant growth and UA from 8/6/18 shows no infection. Complained of mid back pain. I will prescribe prednisone but if pain increasingly gets worse she can have a bladder installation if needed. \par 8/29/18: The patient returned and reported she is feeling better since starting prednisone treatment. Noted the pain is a 1/10 now. We will slowly ween off prednisone starting with 10 mg and 5 mg alternating every day. \par 9/17/18: The patient returned and reported she is currently taking 10 mg and 5 mg alternating every day. Noted she takes 10 mg instead of 5 mg if burning is moderate. Noted her pain has resolved significantly. Denied trouble breathing. Patient denied dysuria, gross hematuria, urgency, or incontinence. We will try 5 mg prednisone daily now. \par 10/08/18: The patient returned and reported she has seen significant improvement since starting steroid treatment. Currently taking Prednisone 5 mg and Nitrofurantoin 100 mg daily. Patient is generally feeling well. \par 11/6/18: The patient returned and reported she is experiencing headaches, nausea, and brief sudden periods of burning. Currently taking Prednisone 4 mg daily.  \par 11/29/18: The patient returned and reported that she is currently taking Prednisone 3 mg. Discontinued use of Pantoprazole. Noted she feels intermittent tingling sensation in left LE.\par 1/14/19: The patient returned and reported her bladder feels well and is without pain. Takes two tablets of Prednisone. Recently had pneumonia. I won't decrease the dosage of Prednisone until her pneumonia passes. Once we decrease her dosage of Prednisone, we will evaluate whether or not she should discontinue use of Nitrofurantoin.\par 2/20/19: The patient returned and reported that her bladder still feels well and is without pain. Currently takes one tablet of Prednisone 10 mg.\par 3/25/19: The patient returned and reported that she had itching for 2-3 days since her last visit. Used salt water, which resolved the symptoms. Currently alternates between Prednisone 4 mg and 5 mg. Patient has gained 12 pounds. Noted that she walks for one hour daily. Complained of hair loss and sleeping more than normal, will consult with PCP. Her urination is satisfactory. Denied bladder pain. Complained of burning when she has constipation.\par 4/22/19: The patient returned today to discuss bone density results. Bone density - central from 4/1/19 findings were unremarkable. Reported that she has occasionally bladder pain with a severity of 2 or 3/10 that lasts for a couple of seconds. Currently alternates between Prednisone 2 mg and 3 mg. Patient denies dysuria, gross hematuria, urgency, or incontinence. Takes Nitrofurantoin. Denies history of kidney stones, asthma and diabetes. Had pneumonia three months ago and many episodes of bronchitis.\par 7/18/19: Patient presents today for a follow up. Today she states that the reported bladder pain has significantly decreased since her last visit and that it is almost back to normal. She continues to alternate between Prednisone 2 mg and 3 mg every other day. She will be travelling to [facility] and inquires about continuing her medication and her urinary health.  Biotin 5000 units is taken everyday. \par 01/30/2020: Patient presents for follow up for recurrent UTIs. She reports she started noticing bladder pain and dysuria 3 days ago after she miss 2 days of nitrofurantoin few days earlier.  Reports when she took cranberry in past it helped\par No hematuria, no frequency, no urgency, no hesitancy, no straining. No incontinence. No fevers, no chills, no nausea, no vomiting, no flank pain.\par \par 2/24/20: Patient presents today for an ultrasound. Pt reports dysuria.  Ultrasound shows 7.0 x 5.0 mm hyperechoic focus mid pole, appears fatty. Does not have the characteristics of an AML or stone. No evidence of stones bilaterally. Both kidneys are normal in size and echogenicity without hydronephrosis or solid masses visualized. Urine culture from two weeks ago was normal. Pt had upper endoscopy recently which showed gastritis. \par \par 01/27/2021: Patient presents today for follow up. Takes Prednisone TID. Will occasionally have bladder pain, but overall feels well. Denies heartburn or upset stomach. Denies dysuria. Takes Nitrofurantoin for UTI prophylaxis and requests refill. Denies cough or pins and needles of fingers. Feels well today. \par \par Decrease Prednisone to twice daily for a few days and evaluate bladder pain. If pain worsens, return to three times daily.\par Continue Nitrofurantoin 100mg once daily at bedtime. \par \par Due to long-term Prednisone use, pt will schedule for DEXA scan. \par \par The patient produced a urine sample which will be sent for urinalysis, urine cytology, and urine culture. \par \par Schedule for telehealth visit in 3 weeks to assess bladder pain on decreased Prednisone. \par RTO in 3 months for follow up and review DEXA scan results. \par \par Preparation, in-person visit, and coordination of care took: 17 minutes

## 2021-02-01 NOTE — HISTORY OF PRESENT ILLNESS
[FreeTextEntry1] : Ms. Barrett is a 46 year old female presented with frequent UTIs. Patient reported UTI symptoms started 6 months ago. 6 months ago she notes she had a fever for 4 days and dysuria which she thought was the flu, she was admitted to the ER. The ER informed her she had a UTI and kidney infection. She was sent home with antibiotics which she noted did not relieve her symptoms. She was then admitted to the hospital for the second time for a UTI and kidney infection. 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 No definite shadowing calculus is noted. No right-sided hydronephrosis is note. She was advised to see an urologist, ,and was probably prescribed Fosfomycin for 3 days and Nitrofurantoin 6 weeks.She noted while taking the antibiotic she had a UTI once again. Patient's recent UTI symptoms started one week ago, complained of dysuria. Patient's  noted she is drinking 10 glasses of water and cranberry juice. Patient noted tingling sensation at right anterior thigh.\par 3/26/18: The patient returned for a cystoscopy. \par 4/12/18: The patient returned for a bladder installation. Aside from the installation, the patient was brought back to the room to discuss recent UTI and hospitalization. Noted she was admitted to the hospital from 4/3/18- 4/6/18 for UTI and kidney infection. She is currently taking Cipro for the infection. Urine culture from the hospital grew E.coli Streptococcus. Today urine clear very light yellow.\par 4/19/18: The patient returned for a bladder installation. Noted relief of her bladder symptoms for 2-3 days after first installation. Urine very dilute clear yellow. \par 4/26/18: The patient returned for a bladder installation. Patient has had relief of bladder symptoms from last instillation through today. She and her  are very pleased.\par 5/7/18: The patient returned and reported she is feeling well. Denied any pain from installations. Patient will return for 2 more installations and then installations every other week. \par 5/15/18: The patient returned for a bladder installation. \par 5/24/18: The patient returned for a bladder installation. Noted she had slight pain since last week.\par 6/4/18: The patient returned for a bladder installation. Noted some pain when moving her legs. Patient has agreed to bladder installations every 2 weeks from now on. \par 7/3/18: The patient returned for a bladder installation. Noted she has been having low grade fevers and intermittent mild dysuria. urine was sent for culture today. \par 7/16/18: The patient returned for a bladder installation. Complained of chills this morning but was afebrile. We will check temperature today. I advised the patient to evaluate infection symptoms with her PCP, does not seem urinary.\par 7/23/18: The patient returned for a bladder installation. Patient is going to [place] next week for one week. \par 8/6/18: The patient returned for a cystoscopy and bladder installation. \par 8/13/18: The patient returned for a bladder installation. Noted bladder installation help for 3 days and then gets intermittent dysuria and pain. Noted she recently had a fever of 101 F with chills. Urine culture from 8/6/18 shows no significant growth and UA from 8/6/18 shows no infection. Complained of mid back pain. I will prescribe prednisone but if pain increasingly gets worse she can have a bladder installation if needed. \par 8/29/18: The patient returned and reported she is feeling better since starting prednisone treatment. Noted the pain is a 1/10 now. We will slowly ween off prednisone starting with 10 mg and 5 mg alternating every day. \par 9/17/18: The patient returned and reported she is currently taking 10 mg and 5 mg alternating every day. Noted she takes 10 mg instead of 5 mg if burning is moderate. Noted her pain has resolved significantly. Denied trouble breathing. Patient denied dysuria, gross hematuria, urgency, or incontinence. We will try 5 mg prednisone daily now. \par 10/08/18: The patient returned and reported she has seen significant improvement since starting steroid treatment. Currently taking Prednisone 5 mg and Nitrofurantoin 100 mg daily. Patient is generally feeling well. \par 11/6/18: The patient returned and reported she is experiencing headaches, nausea, and brief sudden periods of burning. Currently taking Prednisone 4 mg daily.  \par 11/29/18: The patient returned and reported that she is currently taking Prednisone 3 mg. Discontinued use of Pantoprazole. Noted she feels intermittent tingling sensation in left LE.\par 1/14/19: The patient returned and reported her bladder feels well and is without pain. Takes two tablets of Prednisone. Recently had pneumonia. I won't decrease the dosage of Prednisone until her pneumonia passes. Once we decrease her dosage of Prednisone, we will evaluate whether or not she should discontinue use of Nitrofurantoin.\par 2/20/19: The patient returned and reported that her bladder still feels well and is without pain. Currently takes one tablet of Prednisone 10 mg.\par 3/25/19: The patient returned and reported that she had itching for 2-3 days since her last visit. Used salt water, which resolved the symptoms. Currently alternates between Prednisone 4 mg and 5 mg. Patient has gained 12 pounds. Noted that she walks for one hour daily. Complained of hair loss and sleeping more than normal, will consult with PCP. Her urination is satisfactory. Denied bladder pain. Complained of burning when she has constipation.\par 4/22/19: The patient returned today to discuss bone density results. Bone density - central from 4/1/19 findings were unremarkable. Reported that she has occasionally bladder pain with a severity of 2 or 3/10 that lasts for a couple of seconds. Currently alternates between Prednisone 2 mg and 3 mg. Patient denies dysuria, gross hematuria, urgency, or incontinence. Takes Nitrofurantoin. Denies history of kidney stones, asthma and diabetes. Had pneumonia three months ago and many episodes of bronchitis.\par 7/18/19: Patient presents today for a follow up. Today she states that the reported bladder pain has significantly decreased since her last visit and that it is almost back to normal. She continues to alternate between Prednisone 2 mg and 3 mg every other day. She will be travelling to [facility] and inquires about continuing her medication and her urinary health.  Biotin 5000 units is taken everyday. \par 01/30/2020: Patient presents for follow up for recurrent UTIs. She reports she started noticing bladder pain and dysuria 3 days ago after she miss 2 days of nitrofurantoin few days earlier.  Reports when she took cranberry in past it helped\par No hematuria, no frequency, no urgency, no hesitancy, no straining. No incontinence. No fevers, no chills, no nausea, no vomiting, no flank pain.\par \par 2/24/20: Patient presents today for an ultrasound. Pt reports dysuria.  Ultrasound shows 7.0 x 5.0 mm hyperechoic focus mid pole, appears fatty. Does not have the characteristics of an AML or stone. No evidence of stones bilaterally. Both kidneys are normal in size and echogenicity without hydronephrosis or solid masses visualized. Urine culture from two weeks ago was normal. Pt had upper endoscopy recently which showed gastritis. \par \par 01/27/2021: Patient presents today for follow up. Takes Prednisone TID. Will occasionally have bladder pain, but overall feels well. Denies heartburn or upset stomach. Denies dysuria. Takes Nitrofurantoin for UTI prophylaxis and requests refill. Denies cough or pins and needles of fingers. Feels well today. \par

## 2021-02-19 ENCOUNTER — APPOINTMENT (OUTPATIENT)
Dept: UROLOGY | Facility: CLINIC | Age: 46
End: 2021-02-19
Payer: COMMERCIAL

## 2021-02-19 DIAGNOSIS — M25.539 PAIN IN UNSPECIFIED WRIST: ICD-10-CM

## 2021-02-19 PROCEDURE — 99442: CPT

## 2021-02-19 NOTE — HISTORY OF PRESENT ILLNESS
[FreeTextEntry1] : 02/19/2021: The patient is a 45 y/o female who initially presented on 3/13/2018 with urinary urgency and frequency as well as a painful bladder and a hx of UTIs. A number of different therapies were tried for control of infections and finally was achieved with nitrofurantoin 100 mg each night as a prophylaxis against further infections and has been working. Numerous oral medications were tried for painful bladder, urinary frequency and urgency. The medications were either not tolerated or didn’t work. A trial of anesthetic bladder installations did not work. She was tried on prednisone initially at high doses and then tapered down to the point where she takes 3 mg or less every day. This worked for long time. Further reductions below 2 mg seemed to result in recurrence of symptoms. 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 When seen on 2/24/2020 she took an altering regimen of prednisone 2 mg one day and 3 mg the next and repeated the cycle. She was having some heart burn presumedly from low dose prednisone and we started her on Ranitidine . Most recently she was seen on 1/27/2021. She reported at the time that she occasionally has bladder pain while taking 3 mg a day of prednisone. She is still taking nitrofurantoin 100 mg each evening to prevent infections. She no longer has heart burn or stomach problems. She was instructed to continue nitrofurantoin. Her 3 mg of prednisone had been 1 pill 3x daily and I instructed her to go to 1 pill BID . I instructed her to get a DEXA bone density scan since she has been on low dose prednisone for quite some time. We are calling today to discuss the results of her DEXAsan and to see how prednisone 1mg twice daily is working for her. Her last DEXAscan from 4/1/2019 showed risk of fracture was average and normal bone density.\par \par Today, the phone call was conducted with her  who spoke on behalf of his wife. He reports that 2 mg of prednisone a day was not working. She went back to 3 mg a day and the bladder pain is now under control. She denies any stomach problems or heartburn. She is not taking Ranitidine currently. She reports she has radiating pain in the left wrist. Pt reports she did not injure it, however it is warm. She is unable to obtain a DEXAscan until April due to insurance reasons. \par

## 2021-02-19 NOTE — ADDENDUM
[FreeTextEntry1] : This note was authored by Tanna Bella working as a scribe for Dr.Gary Judd. I, Dr. Chato Judd have reviewed the content of this note and confirm it is true and accurate. I personally performed the history and physical examination and made all the decisions 02/19/2021.

## 2021-02-19 NOTE — ASSESSMENT
[FreeTextEntry1] : 02/19/2021: The patient is a 45 y/o female who initially presented on 3/13/2018 with urinary urgency and frequency as well as a painful bladder and a hx of UTIs. A number of different therapies were tried for control of infections and finally was achieved with nitrofurantoin 100 mg each night as a prophylaxis against further infections and has been working. Numerous oral medications were tried for painful bladder, urinary frequency and urgency. The medications were either not tolerated or didn’t work. A trial of anesthetic bladder installations did not work. She was tried on prednisone initially at high doses and then tapered down to the point where she takes 3 mg or less every day. This worked for long time. Further reductions below 2 mg seemed to result in recurrence of symptoms. 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 When seen on 2/24/2020 she took an altering regimen of prednisone 2 mg one day and 3 mg the next and repeated the cycle. She was having some heart burn presumedly from low dose prednisone and we started her on Ranitidine . Most recently she was seen on 1/27/2021. She reported at the time that she occasionally has bladder pain while taking 3 mg a day of prednisone. She is still taking nitrofurantoin 100 mg each evening to prevent infections. She no longer has heart burn or stomach problems. She was instructed to continue nitrofurantoin. Her 3 mg of prednisone had been 1 pill 3x daily and I instructed her to go to 1 pill BID . I instructed her to get a DEXA bone density scan since she has been on low dose prednisone for quite some time. We are calling today to discuss the results of her DEXAsan and to see how prednisone 1mg twice daily is working for her. Her last DEXAscan from 4/1/2019 showed risk of fracture was average and normal bone density.\par \par Today, the phone call was conducted with her  who spoke on behalf of his wife. He reports that 2 mg of prednisone a day was not working. She went back to 3 mg a day and the bladder pain is now under control. She denies any stomach problems or heartburn. She is not taking Ranitidine currently. She reports she has radiating pain in the left wrist. Pt reports she did not injure it, however it is warm. She is unable to obtain a DEXAscan until April due to insurance reasons. \par \par I informed them that she should visit her PCP for her wrist pain. I Informed them that if the PCP is unsure, I can refer them to a rheumatologist or a hand/wrist specialist. \par \par The patient will continue taking Prednisone 1 mg 3x daily to control her painful bladder. \par \par The patient will obtain a DEXAscan in April and return to discuss the results. \par \par Preparation, telephone visit, and coordination of care took : 11 minutes.

## 2021-04-16 NOTE — ED ADULT TRIAGE NOTE - NS ED TRIAGE HISTORIAN
What Type Of Note Output Would You Prefer (Optional)?: Standard Output Hpi Title: Evaluation of Skin Lesions How Severe Are Your Spot(S)?: mild Have Your Spot(S) Been Treated In The Past?: has not been treated Additional History: Pt is here for spot check. Patient

## 2021-06-24 ENCOUNTER — APPOINTMENT (OUTPATIENT)
Dept: RADIOLOGY | Facility: IMAGING CENTER | Age: 46
End: 2021-06-24

## 2021-07-29 NOTE — ED PROVIDER NOTE - NS ED MD DISPO DIVISION
[facility]  NUTRITION ASSESSMENT    Pt does not meet malnutrition criteria.     NUTRITION DIAGNOSIS/PROBLEM:  Predicted suboptimal energy intake related to medical therapy predicted to decrease ability to consume sufficient intake as evidenced by NPO s time    FOOD/NUTRITION RELATED HISTORY:  Appetite: Good  Intake: %  Intake Meeting Needs: Yes  Food Allergies: No  Cultural/Ethnic/Baptism Preferences Addresses: Yes    NUTRITION RELATED PHYSICAL FINDINGS:  1. Body Fat/Muscle Mass: ROSE per visual e I personally saw and examined patient independently at bedside.  I reviewed and agree with the history, physical exam, and medical decision making documented in this chart.  HPI:  pt 61 yo woman. non smoker. pt complaining of cough for the past several weeks. intermittent white phlegm. She denies any shortness of breathing. denies any fever or chills, cp, weakness. She has been prescribed albuterol in the past which she used but it didn't work well.  as per sunrise pt has been to pulmonology twice for cough as recent as 1 month ago. pt also had rapid neg covid test today  PHYSICAL:  Gen: AAOx3, NAD, sitting comfortably in stretcher  Head: ncat, perrla, eomi b/l  Neck: supple, no lymphadenopathy, no midline deviation  Heart: rrr, no m/r/g  Lungs: CTA b/l, no rales/ronchi/wheezes  Abd: soft, nontender, non-distended, no rebound or guarding  Ext: no clubbing/cyanosis/edema  Neuro: sensation and muscle strength intact b/l, steady gait   ASSESMENT/PLAN:  pt 61 yo woman. non smoker. pt complaining of cough for the past several weeks. intermittent white phlegm. She denies any shortness of breathing. denies any fever or chills, cp, weakness. She has been prescribed albuterol in the past which she used but it didn't work well.  as per sunrise pt has been to pulmonology twice for cough as recent as 1 month ago. pt also had rapid neg covid test today  unremarkable exam  will fu pulmonologist [facility]

## 2021-09-07 NOTE — PHYSICAL EXAM
[General Appearance - Well Developed] : well developed [General Appearance - Well Nourished] : well nourished [Normal Appearance] : normal appearance [Well Groomed] : well groomed [General Appearance - In No Acute Distress] : no acute distress [Abdomen Soft] : soft [Abdomen Tenderness] : non-tender [Costovertebral Angle Tenderness] : no ~M costovertebral angle tenderness [Skin Color & Pigmentation] : normal skin color and pigmentation [Edema] : no peripheral edema [] : no respiratory distress [Respiration, Rhythm And Depth] : normal respiratory rhythm and effort [Oriented To Time, Place, And Person] : oriented to person, place, and time [Affect] : the affect was normal [Normal Station and Gait] : the gait and station were normal for the patient's age [No Focal Deficits] : no focal deficits [No Palpable Adenopathy] : no palpable adenopathy [Cervical Lymph Nodes Enlarged Posterior Bilaterally] : posterior cervical [Cervical Lymph Nodes Enlarged Anterior Bilaterally] : anterior cervical [Supraclavicular Lymph Nodes Enlarged Bilaterally] : supraclavicular [Axillary Lymph Nodes Enlarged Bilaterally] : axillary [FreeTextEntry1] : No submandibular gland tenderness.\par  134

## 2021-10-14 ENCOUNTER — APPOINTMENT (OUTPATIENT)
Dept: UROLOGY | Facility: CLINIC | Age: 46
End: 2021-10-14

## 2022-01-04 ENCOUNTER — OUTPATIENT (OUTPATIENT)
Dept: OUTPATIENT SERVICES | Facility: HOSPITAL | Age: 47
LOS: 1 days | End: 2022-01-04
Payer: MEDICAID

## 2022-01-04 ENCOUNTER — APPOINTMENT (OUTPATIENT)
Dept: RADIOLOGY | Facility: IMAGING CENTER | Age: 47
End: 2022-01-04
Payer: MEDICAID

## 2022-01-04 DIAGNOSIS — Z00.8 ENCOUNTER FOR OTHER GENERAL EXAMINATION: ICD-10-CM

## 2022-01-04 DIAGNOSIS — R39.9 UNSPECIFIED SYMPTOMS AND SIGNS INVOLVING THE GENITOURINARY SYSTEM: Chronic | ICD-10-CM

## 2022-01-04 DIAGNOSIS — Z98.891 HISTORY OF UTERINE SCAR FROM PREVIOUS SURGERY: Chronic | ICD-10-CM

## 2022-01-04 PROCEDURE — 77085 DXA BONE DENSITY AXL VRT FX: CPT | Mod: 26

## 2022-01-04 PROCEDURE — 77085 DXA BONE DENSITY AXL VRT FX: CPT

## 2022-01-12 ENCOUNTER — APPOINTMENT (OUTPATIENT)
Dept: UROLOGY | Facility: CLINIC | Age: 47
End: 2022-01-12
Payer: MEDICAID

## 2022-01-12 VITALS
OXYGEN SATURATION: 98 % | RESPIRATION RATE: 16 BRPM | HEART RATE: 73 BPM | WEIGHT: 145 LBS | SYSTOLIC BLOOD PRESSURE: 103 MMHG | DIASTOLIC BLOOD PRESSURE: 62 MMHG | BODY MASS INDEX: 23.4 KG/M2

## 2022-01-12 DIAGNOSIS — H10.9 UNSPECIFIED CONJUNCTIVITIS: ICD-10-CM

## 2022-01-12 PROCEDURE — 99214 OFFICE O/P EST MOD 30 MIN: CPT

## 2022-01-12 RX ORDER — SULFAMETHOXAZOLE AND TRIMETHOPRIM 800; 160 MG/1; MG/1
800-160 TABLET ORAL TWICE DAILY
Qty: 20 | Refills: 0 | Status: COMPLETED | COMMUNITY
Start: 2020-01-30 | End: 2022-01-12

## 2022-01-12 RX ORDER — PREDNISONE 10 MG/1
10 TABLET ORAL DAILY
Qty: 30 | Refills: 1 | Status: COMPLETED | COMMUNITY
Start: 2019-02-05 | End: 2022-01-12

## 2022-01-12 RX ORDER — RANITIDINE 150 MG/1
150 TABLET ORAL
Qty: 60 | Refills: 11 | Status: COMPLETED | COMMUNITY
Start: 2020-02-24 | End: 2022-01-12

## 2022-01-12 NOTE — ADDENDUM
[FreeTextEntry1] : I, Ibeth Kingin, acted solely as a scribe for Dr. Chato Judd on this date 01/12/2022.\par \par All medical record entries made by the Scribe were at my, Dr. Chato Judd, direction and personally dictated by me on 01/12/2022. I have reviewed the chart and agree that the record accurately reflects my personal performance of the history, physical exam, assessment and plan.  I have also personally directed, reviewed and agreed with the chart.

## 2022-01-12 NOTE — ASSESSMENT
[FreeTextEntry1] : Ms. Barrett is a 47 year old female presented with frequent UTIs. Patient reported UTI symptoms started 6 months ago. 6 months ago she notes she had a fever for 4 days and dysuria which she thought was the flu, she was admitted to the ER. The ER informed her she had a UTI and kidney infection. She was sent home with antibiotics which she noted did not relieve her symptoms. She was then admitted to the hospital for the second time for a UTI and kidney infection. 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 No definite shadowing calculus is noted. No right-sided hydronephrosis is note. She was advised to see an urologist, ,and was probably prescribed Fosfomycin for 3 days and Nitrofurantoin 6 weeks.She noted while taking the antibiotic she had a UTI once again. Patient's recent UTI symptoms started one week ago, complained of dysuria. Patient's  noted she is drinking 10 glasses of water and cranberry juice. Patient noted tingling sensation at right anterior thigh.\par 3/26/18: The patient returned for a cystoscopy. \par 4/12/18: The patient returned for a bladder installation. Aside from the installation, the patient was brought back to the room to discuss recent UTI and hospitalization. Noted she was admitted to the hospital from 4/3/18- 4/6/18 for UTI and kidney infection. She is currently taking Cipro for the infection. Urine culture from the hospital grew E.coli Streptococcus. Today urine clear very light yellow.\par 4/19/18: The patient returned for a bladder installation. Noted relief of her bladder symptoms for 2-3 days after first installation. Urine very dilute clear yellow. \par 4/26/18: The patient returned for a bladder installation. Patient has had relief of bladder symptoms from last instillation through today. She and her  are very pleased.\par 5/7/18: The patient returned and reported she is feeling well. Denied any pain from installations. Patient will return for 2 more installations and then installations every other week. \par 5/15/18: The patient returned for a bladder installation. \par 5/24/18: The patient returned for a bladder installation. Noted she had slight pain since last week.\par 6/4/18: The patient returned for a bladder installation. Noted some pain when moving her legs. Patient has agreed to bladder installations every 2 weeks from now on. \par 7/3/18: The patient returned for a bladder installation. Noted she has been having low grade fevers and intermittent mild dysuria. urine was sent for culture today. \par 7/16/18: The patient returned for a bladder installation. Complained of chills this morning but was afebrile. We will check temperature today. I advised the patient to evaluate infection symptoms with her PCP, does not seem urinary.\par 7/23/18: The patient returned for a bladder installation. Patient is going to [place] next week for one week. \par 8/6/18: The patient returned for a cystoscopy and bladder installation. \par 8/13/18: The patient returned for a bladder installation. Noted bladder installation help for 3 days and then gets intermittent dysuria and pain. Noted she recently had a fever of 101 F with chills. Urine culture from 8/6/18 shows no significant growth and UA from 8/6/18 shows no infection. Complained of mid back pain. I will prescribe prednisone but if pain increasingly gets worse she can have a bladder installation if needed. \par 8/29/18: The patient returned and reported she is feeling better since starting prednisone treatment. Noted the pain is a 1/10 now. We will slowly ween off prednisone starting with 10 mg and 5 mg alternating every day. \par 9/17/18: The patient returned and reported she is currently taking 10 mg and 5 mg alternating every day. Noted she takes 10 mg instead of 5 mg if burning is moderate. Noted her pain has resolved significantly. Denied trouble breathing. Patient denied dysuria, gross hematuria, urgency, or incontinence. We will try 5 mg prednisone daily now. \par 10/08/18: The patient returned and reported she has seen significant improvement since starting steroid treatment. Currently taking Prednisone 5 mg and Nitrofurantoin 100 mg daily. Patient is generally feeling well. \par 11/6/18: The patient returned and reported she is experiencing headaches, nausea, and brief sudden periods of burning. Currently taking Prednisone 4 mg daily. \par 11/29/18: The patient returned and reported that she is currently taking Prednisone 3 mg. Discontinued use of Pantoprazole. Noted she feels intermittent tingling sensation in left LE.\par 1/14/19: The patient returned and reported her bladder feels well and is without pain. Takes two tablets of Prednisone. Recently had pneumonia. I won't decrease the dosage of Prednisone until her pneumonia passes. Once we decrease her dosage of Prednisone, we will evaluate whether or not she should discontinue use of Nitrofurantoin.\par 2/20/19: The patient returned and reported that her bladder still feels well and is without pain. Currently takes one tablet of Prednisone 10 mg.\par 3/25/19: The patient returned and reported that she had itching for 2-3 days since her last visit. Used salt water, which resolved the symptoms. Currently alternates between Prednisone 4 mg and 5 mg. Patient has gained 12 pounds. Noted that she walks for one hour daily. Complained of hair loss and sleeping more than normal, will consult with PCP. Her urination is satisfactory. Denied bladder pain. Complained of burning when she has constipation.\par 4/22/19: The patient returned today to discuss bone density results. Bone density - central from 4/1/19 findings were unremarkable. Reported that she has occasionally bladder pain with a severity of 2 or 3/10 that lasts for a couple of seconds. Currently alternates between Prednisone 2 mg and 3 mg. Patient denies dysuria, gross hematuria, urgency, or incontinence. Takes Nitrofurantoin. Denies history of kidney stones, asthma and diabetes. Had pneumonia three months ago and many episodes of bronchitis.\par 7/18/19: Patient presents today for a follow up. Today she states that the reported bladder pain has significantly decreased since her last visit and that it is almost back to normal. She continues to alternate between Prednisone 2 mg and 3 mg every other day. She will be travelling to [facility] and inquires about continuing her medication and her urinary health. Biotin 5000 units is taken everyday. \par 01/30/2020: Patient presents for follow up for recurrent UTIs. She reports she started noticing bladder pain and dysuria 3 days ago after she miss 2 days of nitrofurantoin few days earlier. Reports when she took cranberry in past it helped\par No hematuria, no frequency, no urgency, no hesitancy, no straining. No incontinence. No fevers, no chills, no nausea, no vomiting, no flank pain.\par \par 2/24/20: Patient presents today for an ultrasound. Pt reports dysuria. Ultrasound shows 7.0 x 5.0 mm hyperechoic focus mid pole, appears fatty. Does not have the characteristics of an AML or stone. No evidence of stones bilaterally. Both kidneys are normal in size and echogenicity without hydronephrosis or solid masses visualized. Urine culture from two weeks ago was normal. Pt had upper endoscopy recently which showed gastritis. \par \par 01/27/2021: Patient presents today for follow up. Takes Prednisone TID. Will occasionally have bladder pain, but overall feels well. Denies heartburn or upset stomach. Denies dysuria. Takes Nitrofurantoin for UTI prophylaxis and requests refill. Denies cough or pins and needles of fingers. Feels well today. \par \par 02/19/2021: The patient is a 48 y/o female who initially presented on 3/13/2018 with urinary urgency and frequency as well as a painful bladder and a hx of UTIs. A number of different therapies were tried for control of infections and finally was achieved with nitrofurantoin 100 mg each night as a prophylaxis against further infections and has been working. Numerous oral medications were tried for painful bladder, urinary frequency and urgency. The medications were either not tolerated or didn’t work. A trial of anesthetic bladder installations did not work. She was tried on prednisone initially at high doses and then tapered down to the point where she takes 3 mg or less every day. This worked for long time. Further reductions below 2 mg seemed to result in recurrence of symptoms. 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 When seen on 2/24/2020 she took an altering regimen of prednisone 2 mg one day and 3 mg the next and repeated the cycle. She was having some heart burn presumedly from low dose prednisone and we started her on Ranitidine . Most recently she was seen on 1/27/2021. She reported at the time that she occasionally has bladder pain while taking 3 mg a day of prednisone. She is still taking nitrofurantoin 100 mg each evening to prevent infections. She no longer has heart burn or stomach problems. She was instructed to continue nitrofurantoin. Her 3 mg of prednisone had been 1 pill 3x daily and I instructed her to go to 1 pill BID . I instructed her to get a DEXA bone density scan since she has been on low dose prednisone for quite some time. We are calling today to discuss the results of her DEXAsan and to see how prednisone 1mg twice daily is working for her. Her last DEXAscan from 4/1/2019 showed risk of fracture was average and normal bone density. Today, the phone call was conducted with her  who spoke on behalf of his wife. He reports that 2 mg of prednisone a day was not working. She went back to 3 mg a day and the bladder pain is now under control. She denies any stomach problems or heartburn. She is not taking Ranitidine currently. She reports she has radiating pain in the left wrist. Pt reports she did not injure it, however it is warm. She is unable to obtain a DEXAscan until April due to insurance reasons. \par \par 01/12/2022: Patient presents today for follow up. Currently takes prednisone 2mg in the morning with breakfast and nitrofurantoin 100mg each night for UTI prophylaxis. No heartburn with prednisone. No longer needs ranitidine. Has not been having pain. Feels a little pain when she drinks some water but is okay with teas. No hx or FHx of kidney stones. Reviewed 1/27/21 UA, culture, and cytology which were normal. Reviewed 1/4/22 DEXA which showed bone density is within expected range for age. Does go outside to walk. Notes she has some redness of her right eye which has improved but still red. \par \par I reviewed the interpretation and images of DEXA of 1/4/22. \par \par Renewed nitrofurantoin 100mg once nightly and prednisone 2mg once daily. \par \par Continue supplement diet with high-calcium foods and go outside for vitamin D. \par \par I explained that redness of patient's eye should improve, but if it does not, she should see an ophthalmologist.\par \par Blood work today includes CBC, CMP, vitamin D.\par The patient produced a urine sample which will be sent for urinalysis, urine cytology, and urine culture. \par \par RTO in 1 year for follow up or sooner if new urinary symptoms develop. \par \par Preparation, in-person office visit, and coordination of care took: 30 minutes

## 2022-01-12 NOTE — HISTORY OF PRESENT ILLNESS
[FreeTextEntry1] : 02/19/2021: The patient is a 47 y/o female who initially presented on 3/13/2018 with urinary urgency and frequency as well as a painful bladder and a hx of UTIs. A number of different therapies were tried for control of infections and finally was achieved with nitrofurantoin 100 mg each night as a prophylaxis against further infections and has been working. Numerous oral medications were tried for painful bladder, urinary frequency and urgency. The medications were either not tolerated or didn’t work. A trial of anesthetic bladder installations did not work. She was tried on prednisone initially at high doses and then tapered down to the point where she takes 3 mg or less every day. This worked for long time. Further reductions below 2 mg seemed to result in recurrence of symptoms. 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 When seen on 2/24/2020 she took an altering regimen of prednisone 2 mg one day and 3 mg the next and repeated the cycle. She was having some heart burn presumedly from low dose prednisone and we started her on Ranitidine . Most recently she was seen on 1/27/2021. She reported at the time that she occasionally has bladder pain while taking 3 mg a day of prednisone. She is still taking nitrofurantoin 100 mg each evening to prevent infections. She no longer has heart burn or stomach problems. She was instructed to continue nitrofurantoin. Her 3 mg of prednisone had been 1 pill 3x daily and I instructed her to go to 1 pill BID . I instructed her to get a DEXA bone density scan since she has been on low dose prednisone for quite some time. We are calling today to discuss the results of her DEXAsan and to see how prednisone 1mg twice daily is working for her. Her last DEXAscan from 4/1/2019 showed risk of fracture was average and normal bone density. Today, the phone call was conducted with her  who spoke on behalf of his wife. He reports that 2 mg of prednisone a day was not working. She went back to 3 mg a day and the bladder pain is now under control. She denies any stomach problems or heartburn. She is not taking Ranitidine currently. She reports she has radiating pain in the left wrist. Pt reports she did not injure it, however it is warm. She is unable to obtain a DEXAscan until April due to insurance reasons. \par \par 01/12/2022: Patient presents today for follow up. Currently takes prednisone 2mg in the morning with breakfast and nitrofurantoin 100mg each night for UTI prophylaxis. No heartburn with prednisone. No longer needs ranitidine. Has not been having pain. Feels a little pain when she drinks some water but is okay with teas. No hx or FHx of kidney stones. Reviewed 1/27/21 UA, culture, and cytology which were normal. Reviewed 1/4/22 DEXA which showed bone density is within expected range for age. Does go outside to walk. Notes she has some redness of her right eye which has improved but still red at lateral aspect. Patient has resolving cojuctivitis.

## 2022-01-12 NOTE — REVIEW OF SYSTEMS
[Feeling Poorly] : feeling poorly [Nasal Discharge] : nasal discharge [see HPI] : see HPI [Painful [place]] : painful [place] [Date of last menstrual period ____] : date of last menstrual period: [unfilled] [Seen by urologist before (Name)  ___] : Previously seen by a urologist: [unfilled] [Urine Infection (bladder/kidney)] : bladder/kidney infection [Joint Pain] : joint pain [Depression] : depression [Negative] : Heme/Lymph [Fever] : no fever [Feeling Tired] : not feeling tired [Chest Pain] : no chest pain [Diarrhea] : no diarrhea [Heartburn] : no heartburn [Dysuria] : no dysuria [Incontinence] : no incontinence

## 2022-01-13 LAB
ALBUMIN SERPL ELPH-MCNC: 4.5 G/DL
ALP BLD-CCNC: 67 U/L
ALT SERPL-CCNC: 17 U/L
ANION GAP SERPL CALC-SCNC: 10 MMOL/L
AST SERPL-CCNC: 21 U/L
BASOPHILS # BLD AUTO: 0.03 K/UL
BASOPHILS NFR BLD AUTO: 0.4 %
BILIRUB SERPL-MCNC: 0.2 MG/DL
BUN SERPL-MCNC: 12 MG/DL
CALCIUM SERPL-MCNC: 9.4 MG/DL
CHLORIDE SERPL-SCNC: 102 MMOL/L
CO2 SERPL-SCNC: 26 MMOL/L
CREAT SERPL-MCNC: 0.74 MG/DL
EOSINOPHIL # BLD AUTO: 0.09 K/UL
EOSINOPHIL NFR BLD AUTO: 1.2 %
GLUCOSE SERPL-MCNC: 95 MG/DL
HCT VFR BLD CALC: 37.8 %
HGB BLD-MCNC: 11.8 G/DL
IMM GRANULOCYTES NFR BLD AUTO: 0.4 %
LYMPHOCYTES # BLD AUTO: 2.42 K/UL
LYMPHOCYTES NFR BLD AUTO: 33.6 %
MAN DIFF?: NORMAL
MCHC RBC-ENTMCNC: 27.8 PG
MCHC RBC-ENTMCNC: 31.2 GM/DL
MCV RBC AUTO: 89.2 FL
MONOCYTES # BLD AUTO: 0.6 K/UL
MONOCYTES NFR BLD AUTO: 8.3 %
NEUTROPHILS # BLD AUTO: 4.04 K/UL
NEUTROPHILS NFR BLD AUTO: 56.1 %
PLATELET # BLD AUTO: 347 K/UL
POTASSIUM SERPL-SCNC: 3.9 MMOL/L
PROT SERPL-MCNC: 7 G/DL
RBC # BLD: 4.24 M/UL
RBC # FLD: 13.2 %
SODIUM SERPL-SCNC: 138 MMOL/L
WBC # FLD AUTO: 7.21 K/UL

## 2022-01-15 LAB
24R-OH-CALCIDIOL SERPL-MCNC: 50.7 PG/ML
25(OH)D3 SERPL-MCNC: 28.8 NG/ML
APPEARANCE: ABNORMAL
BACTERIA UR CULT: NORMAL
BACTERIA: NEGATIVE
BILIRUBIN URINE: NEGATIVE
BLOOD URINE: ABNORMAL
COLOR: NORMAL
GLUCOSE QUALITATIVE U: NEGATIVE
HYALINE CASTS: 0 /LPF
KETONES URINE: NEGATIVE
LEUKOCYTE ESTERASE URINE: NEGATIVE
MICROSCOPIC-UA: NORMAL
NITRITE URINE: NEGATIVE
PH URINE: 7
PROTEIN URINE: ABNORMAL
RED BLOOD CELLS URINE: 35 /HPF
SPECIFIC GRAVITY URINE: 1.01
SQUAMOUS EPITHELIAL CELLS: 9 /HPF
UROBILINOGEN URINE: NORMAL
WHITE BLOOD CELLS URINE: 1 /HPF

## 2022-07-11 RX ORDER — NITROFURANTOIN MACROCRYSTALS 100 MG/1
100 CAPSULE ORAL
Qty: 90 | Refills: 3 | Status: ACTIVE | COMMUNITY
Start: 2021-01-27 | End: 1900-01-01

## 2022-07-19 NOTE — PATIENT PROFILE ADULT. - NS TRANSFER PATIENT BELONGINGS
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 OS. Plan: Last treated with an Avastin injection OS 14 weeks ago on 4/12/22. Exam/OCT reveals no IRF/SRF/SRH OS. Condition remains stable. Recommend continuing treatment to maintain the NV AMD.  Avastin administered OS. Hx of recurrent CNV OS at 17 wks on 2/20.   Continue 12 week treatment interval.  

Return in 12 weeks, OCT OU, re-eval Avastin OS Cell Phone/PDA (specify)/Clothing

## 2022-08-16 NOTE — ED PROVIDER NOTE - CAS EDP CONSULT REGARDING 1
Normal vision: sees adequately in most situations; can see medication labels, newsprint
patient's condition

## 2023-01-12 ENCOUNTER — APPOINTMENT (OUTPATIENT)
Dept: UROLOGY | Facility: CLINIC | Age: 48
End: 2023-01-12

## 2023-01-12 DIAGNOSIS — Z91.89 OTHER SPECIFIED PERSONAL RISK FACTORS, NOT ELSEWHERE CLASSIFIED: ICD-10-CM

## 2023-04-12 ENCOUNTER — RX CHANGE (OUTPATIENT)
Age: 48
End: 2023-04-12

## 2023-04-12 NOTE — DISCHARGE NOTE ADULT - MEDICATION SUMMARY - MEDICATIONS TO TAKE
I will START or STAY ON the medications listed below when I get home from the hospital:    acetaminophen 325 mg oral tablet  -- 2 tab(s) by mouth every 6 hours, As needed, Mild Pain (1 - 3)  -- Indication: For Pain control     ferrous sulfate 325 mg (65 mg elemental iron) oral tablet  -- 1 tab(s) by mouth once a day  -- Indication: For Iron deficiency anemia, unspecified iron deficiency anemia type    lactobacillus acidophilus oral capsule  -- 1 cap(s) by mouth once a day for 10 days  -- Indication: For Need for prophylactic measure    ciprofloxacin 500 mg oral tablet  -- 1 tab(s) by mouth every 12 hours for 10 days  -- Indication: For Pyelonephritis    Multiple Vitamins oral tablet  -- 1 tab(s) by mouth once a day  -- Indication: For Supplementation    cyanocobalamin 1000 mcg oral tablet  -- 1 tab(s) by mouth once a day  -- Indication: For Supplementation Glycopyrrolate Pregnancy And Lactation Text: This medication is Pregnancy Category B and is considered safe during pregnancy. It is unknown if it is excreted breast milk.

## 2023-04-25 ENCOUNTER — APPOINTMENT (OUTPATIENT)
Dept: UROLOGY | Facility: CLINIC | Age: 48
End: 2023-04-25

## 2023-05-09 LAB
ANION GAP SERPL CALC-SCNC: 13 MMOL/L
APPEARANCE: ABNORMAL
BACTERIA: NEGATIVE /HPF
BILIRUBIN URINE: NEGATIVE
BLOOD URINE: ABNORMAL
BUN SERPL-MCNC: 13 MG/DL
CALCIUM SERPL-MCNC: 9.3 MG/DL
CAST: 0 /LPF
CHLORIDE SERPL-SCNC: 106 MMOL/L
CO2 SERPL-SCNC: 22 MMOL/L
COLOR: YELLOW
CREAT SERPL-MCNC: 0.67 MG/DL
EGFR: 109 ML/MIN/1.73M2
EPITHELIAL CELLS: 1 /HPF
GLUCOSE QUALITATIVE U: NEGATIVE MG/DL
GLUCOSE SERPL-MCNC: 145 MG/DL
KETONES URINE: NEGATIVE MG/DL
LEUKOCYTE ESTERASE URINE: NEGATIVE
MICROSCOPIC-UA: NORMAL
NITRITE URINE: NEGATIVE
PH URINE: 6
POTASSIUM SERPL-SCNC: 3.8 MMOL/L
PROTEIN URINE: NEGATIVE MG/DL
RED BLOOD CELLS URINE: 1 /HPF
SODIUM SERPL-SCNC: 141 MMOL/L
SPECIFIC GRAVITY URINE: 1.01
UROBILINOGEN URINE: 0.2 MG/DL
WHITE BLOOD CELLS URINE: 1 /HPF

## 2023-05-12 LAB
BACTERIA UR CULT: NORMAL
URINE CYTOLOGY: NORMAL

## 2023-05-12 NOTE — ED ADULT NURSE REASSESSMENT NOTE - NS ED NURSE REASSESS COMMENT FT1
Received from AM shift in bed, no distress noted. Will continue to monitor and provide care as needed.
patient states "I feel better."   patient denies shortens of breath, chills, nausea, chest pain, palpitations.  NSR on continuos cardiac monitor.  IV intact.  family at bedside.   patient aware of care plan.
Yes

## 2023-05-24 ENCOUNTER — APPOINTMENT (OUTPATIENT)
Dept: UROLOGY | Facility: CLINIC | Age: 48
End: 2023-05-24
Payer: MEDICAID

## 2023-05-24 VITALS
SYSTOLIC BLOOD PRESSURE: 120 MMHG | RESPIRATION RATE: 16 BRPM | DIASTOLIC BLOOD PRESSURE: 75 MMHG | HEART RATE: 68 BPM | TEMPERATURE: 98 F | OXYGEN SATURATION: 96 %

## 2023-05-24 PROCEDURE — 99214 OFFICE O/P EST MOD 30 MIN: CPT

## 2023-05-25 NOTE — ASSESSMENT
[FreeTextEntry1] : Ms. Barrett is a 48 year old female presented with frequent UTIs. Patient reported UTI symptoms started 6 months ago. 6 months ago she notes she had a fever for 4 days and dysuria which she thought was the flu, she was admitted to the ER. The ER informed her she had a UTI and kidney infection. She was sent home with antibiotics which she noted did not relieve her symptoms. She was then admitted to the hospital for the second time for a UTI and kidney infection. 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 No definite shadowing calculus is noted. No right-sided hydronephrosis is note. She was advised to see an urologist, ,and was probably prescribed Fosfomycin for 3 days and Nitrofurantoin 6 weeks.She noted while taking the antibiotic she had a UTI once again. Patient's recent UTI symptoms started one week ago, complained of dysuria. Patient's  noted she is drinking 10 glasses of water and cranberry juice. Patient noted tingling sensation at right anterior thigh.\par 3/26/18: The patient returned for a cystoscopy. \par 4/12/18: The patient returned for a bladder installation. Aside from the installation, the patient was brought back to the room to discuss recent UTI and hospitalization. Noted she was admitted to the hospital from 4/3/18- 4/6/18 for UTI and kidney infection. She is currently taking Cipro for the infection. Urine culture from the hospital grew E.coli Streptococcus. Today urine clear very light yellow.\par 4/19/18: The patient returned for a bladder installation. Noted relief of her bladder symptoms for 2-3 days after first installation. Urine very dilute clear yellow. \par 4/26/18: The patient returned for a bladder installation. Patient has had relief of bladder symptoms from last instillation through today. She and her  are very pleased.\par 5/7/18: The patient returned and reported she is feeling well. Denied any pain from installations. Patient will return for 2 more installations and then installations every other week. \par 5/15/18: The patient returned for a bladder installation. \par 5/24/18: The patient returned for a bladder installation. Noted she had slight pain since last week.\par 6/4/18: The patient returned for a bladder installation. Noted some pain when moving her legs. Patient has agreed to bladder installations every 2 weeks from now on. \par 7/3/18: The patient returned for a bladder installation. Noted she has been having low grade fevers and intermittent mild dysuria. urine was sent for culture today. \par 7/16/18: The patient returned for a bladder installation. Complained of chills this morning but was afebrile. We will check temperature today. I advised the patient to evaluate infection symptoms with her PCP, does not seem urinary.\par 7/23/18: The patient returned for a bladder installation. Patient is going to [place] next week for one week. \par 8/6/18: The patient returned for a cystoscopy and bladder installation. \par 8/13/18: The patient returned for a bladder installation. Noted bladder installation help for 3 days and then gets intermittent dysuria and pain. Noted she recently had a fever of 101 F with chills. Urine culture from 8/6/18 shows no significant growth and UA from 8/6/18 shows no infection. Complained of mid back pain. I will prescribe prednisone but if pain increasingly gets worse she can have a bladder installation if needed. \par 8/29/18: The patient returned and reported she is feeling better since starting prednisone treatment. Noted the pain is a 1/10 now. We will slowly ween off prednisone starting with 10 mg and 5 mg alternating every day. \par 9/17/18: The patient returned and reported she is currently taking 10 mg and 5 mg alternating every day. Noted she takes 10 mg instead of 5 mg if burning is moderate. Noted her pain has resolved significantly. Denied trouble breathing. Patient denied dysuria, gross hematuria, urgency, or incontinence. We will try 5 mg prednisone daily now. \par 10/08/18: The patient returned and reported she has seen significant improvement since starting steroid treatment. Currently taking Prednisone 5 mg and Nitrofurantoin 100 mg daily. Patient is generally feeling well. \par 11/6/18: The patient returned and reported she is experiencing headaches, nausea, and brief sudden periods of burning. Currently taking Prednisone 4 mg daily. \par 11/29/18: The patient returned and reported that she is currently taking Prednisone 3 mg. Discontinued use of Pantoprazole. Noted she feels intermittent tingling sensation in left LE.\par 1/14/19: The patient returned and reported her bladder feels well and is without pain. Takes two tablets of Prednisone. Recently had pneumonia. I won't decrease the dosage of Prednisone until her pneumonia passes. Once we decrease her dosage of Prednisone, we will evaluate whether or not she should discontinue use of Nitrofurantoin.\par 2/20/19: The patient returned and reported that her bladder still feels well and is without pain. Currently takes one tablet of Prednisone 10 mg.\par 3/25/19: The patient returned and reported that she had itching for 2-3 days since her last visit. Used salt water, which resolved the symptoms. Currently alternates between Prednisone 4 mg and 5 mg. Patient has gained 12 pounds. Noted that she walks for one hour daily. Complained of hair loss and sleeping more than normal, will consult with PCP. Her urination is satisfactory. Denied bladder pain. Complained of burning when she has constipation.\par 4/22/19: The patient returned today to discuss bone density results. Bone density - central from 4/1/19 findings were unremarkable. Reported that she has occasionally bladder pain with a severity of 2 or 3/10 that lasts for a couple of seconds. Currently alternates between Prednisone 2 mg and 3 mg. Patient denies dysuria, gross hematuria, urgency, or incontinence. Takes Nitrofurantoin. Denies history of kidney stones, asthma and diabetes. Had pneumonia three months ago and many episodes of bronchitis.\par 7/18/19: Patient presents today for a follow up. Today she states that the reported bladder pain has significantly decreased since her last visit and that it is almost back to normal. She continues to alternate between Prednisone 2 mg and 3 mg every other day. She will be travelling to [facility] and inquires about continuing her medication and her urinary health. Biotin 5000 units is taken everyday. \par 01/30/2020: Patient presents for follow up for recurrent UTIs. She reports she started noticing bladder pain and dysuria 3 days ago after she miss 2 days of nitrofurantoin few days earlier. Reports when she took cranberry in past it helped\par No hematuria, no frequency, no urgency, no hesitancy, no straining. No incontinence. No fevers, no chills, no nausea, no vomiting, no flank pain.\par \par 2/24/20: Patient presents today for an ultrasound. Pt reports dysuria. Ultrasound shows 7.0 x 5.0 mm hyperechoic focus mid pole, appears fatty. Does not have the characteristics of an AML or stone. No evidence of stones bilaterally. Both kidneys are normal in size and echogenicity without hydronephrosis or solid masses visualized. Urine culture from two weeks ago was normal. Pt had upper endoscopy recently which showed gastritis. \par \par 01/27/2021: Patient presents today for follow up. Takes Prednisone TID. Will occasionally have bladder pain, but overall feels well. Denies heartburn or upset stomach. Denies dysuria. Takes Nitrofurantoin for UTI prophylaxis and requests refill. Denies cough or pins and needles of fingers. Feels well today. \par \par 02/19/2021: The patient is a 48 y/o female who initially presented on 3/13/2018 with urinary urgency and frequency as well as a painful bladder and a hx of UTIs. A number of different therapies were tried for control of infections and finally was achieved with nitrofurantoin 100 mg each night as a prophylaxis against further infections and has been working. Numerous oral medications were tried for painful bladder, urinary frequency and urgency. The medications were either not tolerated or didn’t work. A trial of anesthetic bladder installations did not work. She was tried on prednisone initially at high doses and then tapered down to the point where she takes 3 mg or less every day. This worked for long time. Further reductions below 2 mg seemed to result in recurrence of symptoms. 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 When seen on 2/24/2020 she took an altering regimen of prednisone 2 mg one day and 3 mg the next and repeated the cycle. She was having some heart burn presumedly from low dose prednisone and we started her on Ranitidine . Most recently she was seen on 1/27/2021. She reported at the time that she occasionally has bladder pain while taking 3 mg a day of prednisone. She is still taking nitrofurantoin 100 mg each evening to prevent infections. She no longer has heart burn or stomach problems. She was instructed to continue nitrofurantoin. Her 3 mg of prednisone had been 1 pill 3x daily and I instructed her to go to 1 pill BID . I instructed her to get a DEXA bone density scan since she has been on low dose prednisone for quite some time. We are calling today to discuss the results of her DEXAsan and to see how prednisone 1mg twice daily is working for her. Her last DEXAscan from 4/1/2019 showed risk of fracture was average and normal bone density. Today, the phone call was conducted with her  who spoke on behalf of his wife. He reports that 2 mg of prednisone a day was not working. She went back to 3 mg a day and the bladder pain is now under control. She denies any stomach problems or heartburn. She is not taking Ranitidine currently. She reports she has radiating pain in the left wrist. Pt reports she did not injure it, however it is warm. She is unable to obtain a DEXAscan until April due to insurance reasons. \par \par 01/12/2022: Patient presents today for follow up. Currently takes prednisone 2mg in the morning with breakfast and nitrofurantoin 100mg each night for UTI prophylaxis. No heartburn with prednisone. No longer needs ranitidine. Has not been having pain. Feels a little pain when she drinks some water but is okay with teas. No hx or FHx of kidney stones. Reviewed 1/27/21 UA, culture, and cytology which were normal. Reviewed 1/4/22 DEXA which showed bone density is within expected range for age. Does go outside to walk. Notes she has some redness of her right eye which has improved but still red. \par \par I reviewed the interpretation and images of DEXA of 1/4/22. \par Renewed nitrofurantoin 100mg once nightly and prednisone 2mg once daily. \par Continue supplement diet with high-calcium foods and go outside for vitamin D. \par I explained that redness of patient's eye should improve, but if it does not, she should see an ophthalmologist.\par Blood work today includes CBC, CMP, vitamin D.\par The patient produced a urine sample which will be sent for urinalysis, urine cytology, and urine culture. \par RTO in 1 year for follow up or sooner if new urinary symptoms develop. \par \par 05/24/2023: MsAfsaneh BARRETT presents today for a follow up. She is accompanied by her . Patient did lab work on 5/9/2032. UA was cloudy and showed trace blood, otherwise normal. Only 1 RBC/HPF. Culture was no significant growth. Urine cytology was negative for high grade urothelial carcinoma. BMP revealed a normal creatinine of 0.67. She reports she has been feeling very well. She continues to take nitrofurantoin 100 mg once daily for UTI prophylaxis and prednisone 1 mg two tablets daily. She has had no infections since she last saw me. Denies urinary stress incontinence, urinary urgency, or urinary leakage. She does complain of knee and wrist pain in the joints. \par \par Reviewed and discussed lab work of 5/9/2023 in detail. \par \par The patient produced a urine sample which will be sent for urinalysis, urine cytology, and urine culture. \par \par Renewed nitrofurantoin 100 mg once daily for UTI prophylaxis. We discussed reducing the prednisone 1 mg from two tablets daily to one tablet daily which she wanted to try as her PCP recommended due to long term use causing loss of bone strength. We have tried tapering in the past, however she had problems. Her last bone density scan was done last year and was normal. We will try tapering once again to 1 mg once daily. If she runs into problems she will schedule a telehealth visit to talk to me about it. \par \par Recommended pt speak to PCP about knee and wrist joint pain. If she needs a recommendation for a rheumatologist, I provided them the name of Dr.Esther Álvarez. \par \par Pt will have a telehealth visit in 2 weeks for reassessment, sooner if clinically indicated. \par \par Preparation, in person office visit, and coordination of care: 30 minutes.

## 2023-05-25 NOTE — ADDENDUM
[FreeTextEntry1] : This note was authored by Tanna Bella working as a scribe for Dr.Gary Judd. I, Dr. Chato Judd have reviewed the content of this note and confirm it is true and accurate. I personally performed the history and physical examination and made all the decisions 05/24/2023

## 2023-05-25 NOTE — REVIEW OF SYSTEMS
[Nasal Discharge] : nasal discharge [see HPI] : see HPI [Painful Triana] : painful Triana [Date of last menstrual period ____] : date of last menstrual period: [unfilled] [Seen by urologist before (Name)  ___] : Previously seen by a urologist: [unfilled] [Joint Pain] : joint pain [Depression] : depression [Negative] : Heme/Lymph [Fever] : no fever [Feeling Tired] : not feeling tired [Chest Pain] : no chest pain [Diarrhea] : no diarrhea [Heartburn] : no heartburn [Dysuria] : no dysuria [Incontinence] : no incontinence

## 2023-05-25 NOTE — HISTORY OF PRESENT ILLNESS
[FreeTextEntry1] : 02/19/2021: The patient is a 47 y/o female who initially presented on 3/13/2018 with urinary urgency and frequency as well as a painful bladder and a hx of UTIs. A number of different therapies were tried for control of infections and finally was achieved with nitrofurantoin 100 mg each night as a prophylaxis against further infections and has been working. Numerous oral medications were tried for painful bladder, urinary frequency and urgency. The medications were either not tolerated or didn’t work. A trial of anesthetic bladder installations did not work. She was tried on prednisone initially at high doses and then tapered down to the point where she takes 3 mg or less every day. This worked for long time. Further reductions below 2 mg seemed to result in recurrence of symptoms. 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 When seen on 2/24/2020 she took an altering regimen of prednisone 2 mg one day and 3 mg the next and repeated the cycle. She was having some heart burn presumedly from low dose prednisone and we started her on Ranitidine . Most recently she was seen on 1/27/2021. She reported at the time that she occasionally has bladder pain while taking 3 mg a day of prednisone. She is still taking nitrofurantoin 100 mg each evening to prevent infections. She no longer has heart burn or stomach problems. She was instructed to continue nitrofurantoin. Her 3 mg of prednisone had been 1 pill 3x daily and I instructed her to go to 1 pill BID . I instructed her to get a DEXA bone density scan since she has been on low dose prednisone for quite some time. We are calling today to discuss the results of her DEXAsan and to see how prednisone 1mg twice daily is working for her. Her last DEXAscan from 4/1/2019 showed risk of fracture was average and normal bone density. Today, the phone call was conducted with her  who spoke on behalf of his wife. He reports that 2 mg of prednisone a day was not working. She went back to 3 mg a day and the bladder pain is now under control. She denies any stomach problems or heartburn. She is not taking Ranitidine currently. She reports she has radiating pain in the left wrist. Pt reports she did not injure it, however it is warm. She is unable to obtain a DEXAscan until April due to insurance reasons. \par \par 01/12/2022: Patient presents today for follow up. Currently takes prednisone 2mg in the morning with breakfast and nitrofurantoin 100mg each night for UTI prophylaxis. No heartburn with prednisone. No longer needs ranitidine. Has not been having pain. Feels a little pain when she drinks some water but is okay with teas. No hx or FHx of kidney stones. Reviewed 1/27/21 UA, culture, and cytology which were normal. Reviewed 1/4/22 DEXA which showed bone density is within expected range for age. Does go outside to walk. Notes she has some redness of her right eye which has improved but still red at lateral aspect. Patient has resolving cojuctivitis. \par \par 05/24/2023: Ms. LEYDA COLEMAN presents today for a follow up. She is accompanied by her . Patient did lab work on 5/9/2032. UA was cloudy and showed trace blood, otherwise normal. Only 1 RBC/HPF. Culture was no significant growth. Urine cytology was negative for high grade urothelial carcinoma. BMP revealed a normal creatinine of 0.67. She reports she has been feeling very well. She continues to take nitrofurantoin 100 mg once daily for UTI prophylaxis and prednisone 1 mg two tablets daily. She has had no infections since she last saw me. Denies urinary stress incontinence, urinary urgency, or urinary leakage. She does complain of knee and wrist pain in the joints.

## 2023-05-26 LAB
APPEARANCE: CLEAR
BACTERIA UR CULT: NORMAL
BACTERIA: NEGATIVE /HPF
BILIRUBIN URINE: NEGATIVE
BLOOD URINE: NEGATIVE
CAST: 0 /LPF
COLOR: YELLOW
EPITHELIAL CELLS: 0 /HPF
GLUCOSE QUALITATIVE U: NEGATIVE MG/DL
KETONES URINE: NEGATIVE MG/DL
LEUKOCYTE ESTERASE URINE: NEGATIVE
MICROSCOPIC-UA: NORMAL
NITRITE URINE: NEGATIVE
PH URINE: 7
PROTEIN URINE: NEGATIVE MG/DL
RED BLOOD CELLS URINE: 0 /HPF
SPECIFIC GRAVITY URINE: 1.01
URINE CYTOLOGY: NORMAL
UROBILINOGEN URINE: 0.2 MG/DL
WHITE BLOOD CELLS URINE: 0 /HPF

## 2023-06-05 NOTE — ED ADULT NURSE NOTE - PAIN: BODY LOCATION
diaphragm used for breathing/accessory muscles used/retractions/abdominal muscles used
rt left flank

## 2023-06-06 NOTE — DIETITIAN INITIAL EVALUATION ADULT. - 30 CAL TO
"Subjective   Patient ID: Som Mcfarlane is a 89 y.o. male who presents for Follow-up (Having heartburn and appetite decreased ).    routine follow up. chronic issues as per assessment and plan.          Review of Systems   Constitutional:  Negative for chills, diaphoresis, fever and unexpected weight change.   HENT:  Negative for congestion, sinus pressure, sinus pain, sneezing and sore throat.    Respiratory:  Negative for cough, chest tightness, shortness of breath and wheezing.    Cardiovascular:  Negative for chest pain, palpitations and leg swelling.   Gastrointestinal:  Negative for abdominal pain, constipation, diarrhea, nausea and vomiting.   Endocrine: Negative for cold intolerance, heat intolerance, polydipsia, polyphagia and polyuria.   Genitourinary:  Negative for dysuria, frequency, hematuria and urgency.   Neurological:  Negative for dizziness, syncope, light-headedness, numbness and headaches.   Hematological:  Negative for adenopathy.   Psychiatric/Behavioral:  Negative for confusion and dysphoric mood. The patient is not nervous/anxious.        Objective   /62   Pulse 56   Resp 18   Ht 1.727 m (5' 8\")   Wt 66.2 kg (146 lb)   SpO2 93%   BMI 22.20 kg/m²     Physical Exam  Vitals and nursing note reviewed.   Constitutional:       General: He is not in acute distress.     Appearance: Normal appearance.   HENT:      Head: Normocephalic and atraumatic.      Nose: Nose normal.   Eyes:      Extraocular Movements: Extraocular movements intact.      Conjunctiva/sclera: Conjunctivae normal.      Pupils: Pupils are equal, round, and reactive to light.   Cardiovascular:      Rate and Rhythm: Normal rate and regular rhythm.      Heart sounds: No murmur heard.     No friction rub. No gallop.   Pulmonary:      Effort: Pulmonary effort is normal.      Breath sounds: Normal breath sounds. No wheezing, rhonchi or rales.   Abdominal:      General: Bowel sounds are normal. There is no distension.      " Palpations: Abdomen is soft.      Tenderness: There is no abdominal tenderness.   Musculoskeletal:         General: Normal range of motion.      Cervical back: Normal range of motion and neck supple.   Skin:     General: Skin is warm and dry.   Neurological:      General: No focal deficit present.      Mental Status: He is alert and oriented to person, place, and time.      Deep Tendon Reflexes: Reflexes normal.   Psychiatric:         Mood and Affect: Mood normal.         Behavior: Behavior normal.         Thought Content: Thought content normal.         Judgment: Judgment normal.         Assessment/Plan   Problem List Items Addressed This Visit       Adult hypothyroidism - Primary     stable. continue levothyroxine. recheck TFTs around Apr 2024         Athscl heart disease of native coronary artery w/o ang pctrs     on statin. on aspirin. follows with cardiology         Bilateral renal cysts     - seen on 3/17/21 MRI lumbar spine.   - ordered renal ultrasound in the past but has not gotten. Does not want to pursue         Calcified granuloma of lung (CMS/HCC)     stable         Chronic diastolic heart failure secondary to coronary artery disease (CMS/HCC)     - follows with cardiology          Chronic obstructive pulmonary disease (CMS/HCC)     - continue advair. Albuterol as needed          Chronic venous insufficiency     - currently on furosemide  - denies edema         Decreased appetite     - has had decreased appetite and decreased desire for food  - weight down 10 pounds in the past two years  - recent TSH (4/27/2023) was normal   - recommended boost or ensure  - discussed mirtazapine. Wants to hold off for now  - return in 3 months for weight check         Relevant Orders    Follow Up In Primary Care    DVT (deep venous thrombosis) (CMS/HCC)     in Nov. 2020. no longer on eliquis         Dyslipidemia     - controlled. continue atorvastatin   - Check labs          Relevant Orders    Lipid Panel    Elevated  serum creatinine     - Creatinine 1.47 on blood work with cardiology in April 2023. His baseline is usually between 0.8 and 1  - cardiology did stop his spironolactone  - recheck labs          Relevant Orders    Comprehensive Metabolic Panel    Essential hypertension     - controlled. Continue losartan and metoprolol         Relevant Orders    Comprehensive Metabolic Panel    GERD without esophagitis     - heartburn increased because he had been off the pantoprazole. He restarted it and is feeling better         Relevant Medications    pantoprazole (ProtoNix) 40 mg EC tablet    Hyperbilirubinemia     has been evaluated by GI. stable. no need for treatment.         Iron deficiency anemia     - seeing hematology   - oral iron not tolerated  - had iron infusion  - most recent Hb 11         Paroxysmal SVT (supraventricular tachycardia) (CMS/HCC)     follows with cardiology         Prediabetes     - Encouraged healthy lifestyle, including adequate exercise and high fiber, low fat and low carb diet.   - recheck around Oct. 2023         PVD (peripheral vascular disease) (CMS/HCC)     on aspirin, on atorvastatin. following with Dr. Del Real          Vitamin D deficiency     - continue vitamin D  - check labs          Relevant Orders    Vitamin D, Total           1957

## 2023-06-19 ENCOUNTER — APPOINTMENT (OUTPATIENT)
Dept: UROLOGY | Facility: CLINIC | Age: 48
End: 2023-06-19
Payer: MEDICAID

## 2023-06-19 DIAGNOSIS — N39.0 URINARY TRACT INFECTION, SITE NOT SPECIFIED: ICD-10-CM

## 2023-06-19 DIAGNOSIS — R31.29 OTHER MICROSCOPIC HEMATURIA: ICD-10-CM

## 2023-06-19 DIAGNOSIS — R39.89 OTHER SYMPTOMS AND SIGNS INVOLVING THE GENITOURINARY SYSTEM: ICD-10-CM

## 2023-06-19 PROCEDURE — 99213 OFFICE O/P EST LOW 20 MIN: CPT | Mod: 95

## 2023-06-19 NOTE — ADDENDUM
[FreeTextEntry1] : This note was authored by Annmarie Guzman working as a scribe for Dr.Gary Judd. I, Dr. Chato Judd have reviewed the content of this note and confirm it is true and accurate. I personally performed the history and physical examination and made all the decisions 06/19/2023\par

## 2023-06-19 NOTE — ASSESSMENT
[FreeTextEntry1] : Ms. Barrett is a 48 year old female presented with frequent UTIs. Patient reported UTI symptoms started 6 months ago. 6 months ago she notes she had a fever for 4 days and dysuria which she thought was the flu, she was admitted to the ER. The ER informed her she had a UTI and kidney infection. She was sent home with antibiotics which she noted did not relieve her symptoms. She was then admitted to the hospital for the second time for a UTI and kidney infection. 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 No definite shadowing calculus is noted. No right-sided hydronephrosis is note. She was advised to see an urologist, ,and was probably prescribed Fosfomycin for 3 days and Nitrofurantoin 6 weeks.She noted while taking the antibiotic she had a UTI once again. Patient's recent UTI symptoms started one week ago, complained of dysuria. Patient's  noted she is drinking 10 glasses of water and cranberry juice. Patient noted tingling sensation at right anterior thigh.\par 3/26/18: The patient returned for a cystoscopy. \par 4/12/18: The patient returned for a bladder installation. Aside from the installation, the patient was brought back to the room to discuss recent UTI and hospitalization. Noted she was admitted to the hospital from 4/3/18- 4/6/18 for UTI and kidney infection. She is currently taking Cipro for the infection. Urine culture from the hospital grew E.coli Streptococcus. Today urine clear very light yellow.\par 4/19/18: The patient returned for a bladder installation. Noted relief of her bladder symptoms for 2-3 days after first installation. Urine very dilute clear yellow. \par 4/26/18: The patient returned for a bladder installation. Patient has had relief of bladder symptoms from last instillation through today. She and her  are very pleased.\par 5/7/18: The patient returned and reported she is feeling well. Denied any pain from installations. Patient will return for 2 more installations and then installations every other week. \par 5/15/18: The patient returned for a bladder installation. \par 5/24/18: The patient returned for a bladder installation. Noted she had slight pain since last week.\par 6/4/18: The patient returned for a bladder installation. Noted some pain when moving her legs. Patient has agreed to bladder installations every 2 weeks from now on. \par 7/3/18: The patient returned for a bladder installation. Noted she has been having low grade fevers and intermittent mild dysuria. urine was sent for culture today. \par 7/16/18: The patient returned for a bladder installation. Complained of chills this morning but was afebrile. We will check temperature today. I advised the patient to evaluate infection symptoms with her PCP, does not seem urinary.\par 7/23/18: The patient returned for a bladder installation. Patient is going to [place] next week for one week. \par 8/6/18: The patient returned for a cystoscopy and bladder installation. \par 8/13/18: The patient returned for a bladder installation. Noted bladder installation help for 3 days and then gets intermittent dysuria and pain. Noted she recently had a fever of 101 F with chills. Urine culture from 8/6/18 shows no significant growth and UA from 8/6/18 shows no infection. Complained of mid back pain. I will prescribe prednisone but if pain increasingly gets worse she can have a bladder installation if needed. \par 8/29/18: The patient returned and reported she is feeling better since starting prednisone treatment. Noted the pain is a 1/10 now. We will slowly ween off prednisone starting with 10 mg and 5 mg alternating every day. \par 9/17/18: The patient returned and reported she is currently taking 10 mg and 5 mg alternating every day. Noted she takes 10 mg instead of 5 mg if burning is moderate. Noted her pain has resolved significantly. Denied trouble breathing. Patient denied dysuria, gross hematuria, urgency, or incontinence. We will try 5 mg prednisone daily now. \par 10/08/18: The patient returned and reported she has seen significant improvement since starting steroid treatment. Currently taking Prednisone 5 mg and Nitrofurantoin 100 mg daily. Patient is generally feeling well. \par 11/6/18: The patient returned and reported she is experiencing headaches, nausea, and brief sudden periods of burning. Currently taking Prednisone 4 mg daily. \par 11/29/18: The patient returned and reported that she is currently taking Prednisone 3 mg. Discontinued use of Pantoprazole. Noted she feels intermittent tingling sensation in left LE.\par 1/14/19: The patient returned and reported her bladder feels well and is without pain. Takes two tablets of Prednisone. Recently had pneumonia. I won't decrease the dosage of Prednisone until her pneumonia passes. Once we decrease her dosage of Prednisone, we will evaluate whether or not she should discontinue use of Nitrofurantoin.\par 2/20/19: The patient returned and reported that her bladder still feels well and is without pain. Currently takes one tablet of Prednisone 10 mg.\par 3/25/19: The patient returned and reported that she had itching for 2-3 days since her last visit. Used salt water, which resolved the symptoms. Currently alternates between Prednisone 4 mg and 5 mg. Patient has gained 12 pounds. Noted that she walks for one hour daily. Complained of hair loss and sleeping more than normal, will consult with PCP. Her urination is satisfactory. Denied bladder pain. Complained of burning when she has constipation.\par 4/22/19: The patient returned today to discuss bone density results. Bone density - central from 4/1/19 findings were unremarkable. Reported that she has occasionally bladder pain with a severity of 2 or 3/10 that lasts for a couple of seconds. Currently alternates between Prednisone 2 mg and 3 mg. Patient denies dysuria, gross hematuria, urgency, or incontinence. Takes Nitrofurantoin. Denies history of kidney stones, asthma and diabetes. Had pneumonia three months ago and many episodes of bronchitis.\par 7/18/19: Patient presents today for a follow up. Today she states that the reported bladder pain has significantly decreased since her last visit and that it is almost back to normal. She continues to alternate between Prednisone 2 mg and 3 mg every other day. She will be travelling to [facility] and inquires about continuing her medication and her urinary health. Biotin 5000 units is taken everyday. \par 01/30/2020: Patient presents for follow up for recurrent UTIs. She reports she started noticing bladder pain and dysuria 3 days ago after she miss 2 days of nitrofurantoin few days earlier. Reports when she took cranberry in past it helped\par No hematuria, no frequency, no urgency, no hesitancy, no straining. No incontinence. No fevers, no chills, no nausea, no vomiting, no flank pain.\par \par 2/24/20: Patient presents today for an ultrasound. Pt reports dysuria. Ultrasound shows 7.0 x 5.0 mm hyperechoic focus mid pole, appears fatty. Does not have the characteristics of an AML or stone. No evidence of stones bilaterally. Both kidneys are normal in size and echogenicity without hydronephrosis or solid masses visualized. Urine culture from two weeks ago was normal. Pt had upper endoscopy recently which showed gastritis. \par \par 01/27/2021: Patient presents today for follow up. Takes Prednisone TID. Will occasionally have bladder pain, but overall feels well. Denies heartburn or upset stomach. Denies dysuria. Takes Nitrofurantoin for UTI prophylaxis and requests refill. Denies cough or pins and needles of fingers. Feels well today. \par \par 02/19/2021: The patient is a 48 y/o female who initially presented on 3/13/2018 with urinary urgency and frequency as well as a painful bladder and a hx of UTIs. A number of different therapies were tried for control of infections and finally was achieved with nitrofurantoin 100 mg each night as a prophylaxis against further infections and has been working. Numerous oral medications were tried for painful bladder, urinary frequency and urgency. The medications were either not tolerated or didn’t work. A trial of anesthetic bladder installations did not work. She was tried on prednisone initially at high doses and then tapered down to the point where she takes 3 mg or less every day. This worked for long time. Further reductions below 2 mg seemed to result in recurrence of symptoms. 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 When seen on 2/24/2020 she took an altering regimen of prednisone 2 mg one day and 3 mg the next and repeated the cycle. She was having some heart burn presumedly from low dose prednisone and we started her on Ranitidine . Most recently she was seen on 1/27/2021. She reported at the time that she occasionally has bladder pain while taking 3 mg a day of prednisone. She is still taking nitrofurantoin 100 mg each evening to prevent infections. She no longer has heart burn or stomach problems. She was instructed to continue nitrofurantoin. Her 3 mg of prednisone had been 1 pill 3x daily and I instructed her to go to 1 pill BID . I instructed her to get a DEXA bone density scan since she has been on low dose prednisone for quite some time. We are calling today to discuss the results of her DEXAsan and to see how prednisone 1mg twice daily is working for her. Her last DEXAscan from 4/1/2019 showed risk of fracture was average and normal bone density. Today, the phone call was conducted with her  who spoke on behalf of his wife. He reports that 2 mg of prednisone a day was not working. She went back to 3 mg a day and the bladder pain is now under control. She denies any stomach problems or heartburn. She is not taking Ranitidine currently. She reports she has radiating pain in the left wrist. Pt reports she did not injure it, however it is warm. She is unable to obtain a DEXAscan until April due to insurance reasons. \par \par 01/12/2022: Patient presents today for follow up. Currently takes prednisone 2mg in the morning with breakfast and nitrofurantoin 100mg each night for UTI prophylaxis. No heartburn with prednisone. No longer needs ranitidine. Has not been having pain. Feels a little pain when she drinks some water but is okay with teas. No hx or FHx of kidney stones. Reviewed 1/27/21 UA, culture, and cytology which were normal. Reviewed 1/4/22 DEXA which showed bone density is within expected range for age. Does go outside to walk. Notes she has some redness of her right eye which has improved but still red. \par \par I reviewed the interpretation and images of DEXA of 1/4/22. \par Renewed nitrofurantoin 100mg once nightly and prednisone 2mg once daily. \par Continue supplement diet with high-calcium foods and go outside for vitamin D. \par I explained that redness of patient's eye should improve, but if it does not, she should see an ophthalmologist.\par Blood work today includes CBC, CMP, vitamin D.\par The patient produced a urine sample which will be sent for urinalysis, urine cytology, and urine culture. \par RTO in 1 year for follow up or sooner if new urinary symptoms develop. \par \par 05/24/2023: Ms. LEYDA BARRETT presents today for a follow up. She is accompanied by her . Patient did lab work on 5/9/2032. UA was cloudy and showed trace blood, otherwise normal. Only 1 RBC/HPF. Culture was no significant growth. Urine cytology was negative for high grade urothelial carcinoma. BMP revealed a normal creatinine of 0.67. She reports she has been feeling very well. She continues to take nitrofurantoin 100 mg once daily for UTI prophylaxis and prednisone 1 mg two tablets daily. She has had no infections since she last saw me. Denies urinary stress incontinence, urinary urgency, or urinary leakage. She does complain of knee and wrist pain in the joints. \par \par Reviewed and discussed lab work of 5/9/2023 in detail. \par \par The patient produced a urine sample which will be sent for urinalysis, urine cytology, and urine culture. \par \par Renewed nitrofurantoin 100 mg once daily for UTI prophylaxis. We discussed reducing the prednisone 1 mg from two tablets daily to one tablet daily which she wanted to try as her PCP recommended due to long term use causing loss of bone strength. We have tried tapering in the past, however she had problems. Her last bone density scan was done last year and was normal. We will try tapering once again to 1 mg once daily. If she runs into problems she will schedule a telehealth visit to talk to me about it. \par \par Recommended pt speak to PCP about knee and wrist joint pain. If she needs a recommendation for a rheumatologist, I provided them the name of Dr.Esther Álvarez. \par Pt will have a telehealth visit in 2 weeks for reassessment, sooner if clinically indicated. \par \par 06/19/2023: Ms. BARRETT presents today for a follow up audio visual Facetime telehealth visit for which they gave permission for. She is accompanied by her . The patient was located at home [address] [place] [address] and I was located in my office in [place], [place]. The patient obtained lab work 5/24/23 prior to today's visit.  The UA, Urine culture and Urine cytology were all normal findings. The patient continues to take Prednisone 1 mg every day as she is feeling good and offers no new complaints. Denies having any UTI's recently as she used to get them frequently. She notes walking and exercising every day. \par \par Reviewed and discussed laboratory work of 5/24/23 which She  obtained prior to today's visit as requested.\par \par Pt will go to her nearest [facility] lab for blood work and a urine sample which will be sent for urinalysis, urine culture, and urine cytology.\par \par Pt will schedule a telehealth visit in 2 months to reassess Prednisone and if she improves we will decrease it to 1 mg every other day.\par \par Preparation, audio-visual visit, and coordination of care took : 21 Minutes

## 2023-06-19 NOTE — PHYSICAL EXAM
[General Appearance - Well Developed] : well developed [Normal Appearance] : normal appearance [General Appearance - In No Acute Distress] : no acute distress [] : no respiratory distress [Respiration, Rhythm And Depth] : normal respiratory rhythm and effort [Exaggerated Use Of Accessory Muscles For Inspiration] : no accessory muscle use [Oriented To Time, Place, And Person] : oriented to person, place, and time [Affect] : the affect was normal [Mood] : the mood was normal [Not Anxious] : not anxious

## 2023-06-19 NOTE — REVIEW OF SYSTEMS
[Nasal Discharge] : nasal discharge [see HPI] : see HPI [Painful [place]] : painful [place] [Date of last menstrual period ____] : date of last menstrual period: [unfilled] [Seen by urologist before (Name)  ___] : Previously seen by a urologist: [unfilled] [Joint Pain] : joint pain [Depression] : depression [Negative] : Heme/Lymph [Fever] : no fever [Feeling Tired] : not feeling tired [Chest Pain] : no chest pain [Diarrhea] : no diarrhea [Heartburn] : no heartburn [Dysuria] : no dysuria [Incontinence] : no incontinence

## 2023-06-19 NOTE — HISTORY OF PRESENT ILLNESS
[FreeTextEntry1] : 02/19/2021: The patient is a 49 y/o female who initially presented on 3/13/2018 with urinary urgency and frequency as well as a painful bladder and a hx of UTIs. A number of different therapies were tried for control of infections and finally was achieved with nitrofurantoin 100 mg each night as a prophylaxis against further infections and has been working. Numerous oral medications were tried for painful bladder, urinary frequency and urgency. The medications were either not tolerated or didn’t work. A trial of anesthetic bladder installations did not work. She was tried on prednisone initially at high doses and then tapered down to the point where she takes 3 mg or less every day. This worked for long time. Further reductions below 2 mg seemed to result in recurrence of symptoms. 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 When seen on 2/24/2020 she took an altering regimen of prednisone 2 mg one day and 3 mg the next and repeated the cycle. She was having some heart burn presumedly from low dose prednisone and we started her on Ranitidine . Most recently she was seen on 1/27/2021. She reported at the time that she occasionally has bladder pain while taking 3 mg a day of prednisone. She is still taking nitrofurantoin 100 mg each evening to prevent infections. She no longer has heart burn or stomach problems. She was instructed to continue nitrofurantoin. Her 3 mg of prednisone had been 1 pill 3x daily and I instructed her to go to 1 pill BID . I instructed her to get a DEXA bone density scan since she has been on low dose prednisone for quite some time. We are calling today to discuss the results of her DEXAsan and to see how prednisone 1mg twice daily is working for her. Her last DEXAscan from 4/1/2019 showed risk of fracture was average and normal bone density. Today, the phone call was conducted with her  who spoke on behalf of his wife. He reports that 2 mg of prednisone a day was not working. She went back to 3 mg a day and the bladder pain is now under control. She denies any stomach problems or heartburn. She is not taking Ranitidine currently. She reports she has radiating pain in the left wrist. Pt reports she did not injure it, however it is warm. She is unable to obtain a DEXAscan until April due to insurance reasons. \par \par 01/12/2022: Patient presents today for follow up. Currently takes prednisone 2mg in the morning with breakfast and nitrofurantoin 100mg each night for UTI prophylaxis. No heartburn with prednisone. No longer needs ranitidine. Has not been having pain. Feels a little pain when she drinks some water but is okay with teas. No hx or FHx of kidney stones. Reviewed 1/27/21 UA, culture, and cytology which were normal. Reviewed 1/4/22 DEXA which showed bone density is within expected range for age. Does go outside to walk. Notes she has some redness of her right eye which has improved but still red at lateral aspect. Patient has resolving cojuctivitis. \par \par 05/24/2023: Ms. LEYDA COLEMAN presents today for a follow up. She is accompanied by her . Patient did lab work on 5/9/2032. UA was cloudy and showed trace blood, otherwise normal. Only 1 RBC/HPF. Culture was no significant growth. Urine cytology was negative for high grade urothelial carcinoma. BMP revealed a normal creatinine of 0.67. She reports she has been feeling very well. She continues to take nitrofurantoin 100 mg once daily for UTI prophylaxis and prednisone 1 mg two tablets daily. She has had no infections since she last saw me. Denies urinary stress incontinence, urinary urgency, or urinary leakage. She does complain of knee and wrist pain in the joints. \par \par 06/19/2023: Ms. COLEMAN presents today for a follow up audio visual Facetime telehealth visit for which they gave permission for. She is accompanied by her . The patient was located at home [address] [place] [address] and I was located in my office in [place], [place]. The patient obtained lab work 5/24/23 prior to today's visit.  The UA, Urine culture and Urine cytology were all normal findings. The patient continues to take Prednisone 1 mg every day as she is feeling good and offers no new complaints. Denies having any UTI's recently as she used to get them frequently. She notes walking and exercising every day.

## 2023-08-24 ENCOUNTER — APPOINTMENT (OUTPATIENT)
Dept: UROLOGY | Facility: CLINIC | Age: 48
End: 2023-08-24

## 2024-01-03 RX ORDER — NITROFURANTOIN MACROCRYSTALS 100 MG/1
100 CAPSULE ORAL
Qty: 90 | Refills: 3 | Status: ACTIVE | COMMUNITY
Start: 2018-04-12 | End: 1900-01-01

## 2024-01-03 RX ORDER — PREDNISONE 1 MG/1
1 TABLET ORAL DAILY
Qty: 180 | Refills: 3 | Status: ACTIVE | COMMUNITY
Start: 2018-08-13 | End: 1900-01-01

## 2024-02-21 NOTE — MEDICAL STUDENT ADULT H&P (EDUCATION) - NS MD HP STUD PE CONSITUTIONAL FT
General: Well developed, well nourished, NAD Timely oral transit. Premature spillover vs. delayed initiation of pharyngeal swallow. Slightly delayed oral transit time.

## 2024-03-19 ENCOUNTER — EMERGENCY (EMERGENCY)
Facility: HOSPITAL | Age: 49
LOS: 1 days | Discharge: HOME | End: 2024-03-19
Attending: EMERGENCY MEDICINE | Admitting: EMERGENCY MEDICINE
Payer: MEDICAID

## 2024-03-19 VITALS
HEART RATE: 73 BPM | DIASTOLIC BLOOD PRESSURE: 87 MMHG | SYSTOLIC BLOOD PRESSURE: 134 MMHG | OXYGEN SATURATION: 100 % | TEMPERATURE: 97 F | HEIGHT: 61 IN | WEIGHT: 149.03 LBS | RESPIRATION RATE: 18 BRPM

## 2024-03-19 DIAGNOSIS — Z98.891 HISTORY OF UTERINE SCAR FROM PREVIOUS SURGERY: Chronic | ICD-10-CM

## 2024-03-19 DIAGNOSIS — R39.9 UNSPECIFIED SYMPTOMS AND SIGNS INVOLVING THE GENITOURINARY SYSTEM: Chronic | ICD-10-CM

## 2024-03-19 PROCEDURE — 99284 EMERGENCY DEPT VISIT MOD MDM: CPT

## 2024-03-19 PROCEDURE — 90715 TDAP VACCINE 7 YRS/> IM: CPT

## 2024-03-19 PROCEDURE — 70450 CT HEAD/BRAIN W/O DYE: CPT | Mod: MC

## 2024-03-19 PROCEDURE — 70450 CT HEAD/BRAIN W/O DYE: CPT | Mod: 26,MC

## 2024-03-19 PROCEDURE — 99284 EMERGENCY DEPT VISIT MOD MDM: CPT | Mod: 25

## 2024-03-19 PROCEDURE — 90471 IMMUNIZATION ADMIN: CPT

## 2024-03-19 RX ORDER — PREGABALIN 225 MG/1
1 CAPSULE ORAL
Qty: 0 | Refills: 0 | DISCHARGE

## 2024-03-19 RX ORDER — ACETAMINOPHEN 500 MG
650 TABLET ORAL ONCE
Refills: 0 | Status: COMPLETED | OUTPATIENT
Start: 2024-03-19 | End: 2024-03-19

## 2024-03-19 RX ORDER — TETANUS TOXOID, REDUCED DIPHTHERIA TOXOID AND ACELLULAR PERTUSSIS VACCINE, ADSORBED 5; 2.5; 8; 8; 2.5 [IU]/.5ML; [IU]/.5ML; UG/.5ML; UG/.5ML; UG/.5ML
0.5 SUSPENSION INTRAMUSCULAR ONCE
Refills: 0 | Status: COMPLETED | OUTPATIENT
Start: 2024-03-19 | End: 2024-03-19

## 2024-03-19 RX ORDER — FERROUS SULFATE 325(65) MG
1 TABLET ORAL
Qty: 0 | Refills: 0 | DISCHARGE

## 2024-03-19 RX ADMIN — Medication 650 MILLIGRAM(S): at 07:42

## 2024-03-19 RX ADMIN — TETANUS TOXOID, REDUCED DIPHTHERIA TOXOID AND ACELLULAR PERTUSSIS VACCINE, ADSORBED 0.5 MILLILITER(S): 5; 2.5; 8; 8; 2.5 SUSPENSION INTRAMUSCULAR at 07:42

## 2024-03-19 NOTE — ED PROVIDER NOTE - PROGRESS NOTE DETAILS
Discussed with patient and , aware patient may have mild concussion for which treatment would be rest and Tylenol as needed and hydration.  Also advised to rest eyes avoid reading and looking at screens.

## 2024-03-19 NOTE — ED PROVIDER NOTE - CLINICAL SUMMARY MEDICAL DECISION MAKING FREE TEXT BOX
49-year-old female with past medical history of UTIs pyelonephritis presents to ED complaining of bleeding from scalp status post slip and fall this morning at home patient walking upstairs tripped forward striking head on front door.  CT HEad, wound care, tylenol, Tdap.

## 2024-03-19 NOTE — ED PROVIDER NOTE - OBJECTIVE STATEMENT
49-year-old female with past medical history of UTIs pyelonephritis presents to ED complaining of bleeding from scalp status post slip and fall this morning at home patient walking upstairs tripped forward striking head on front door.  Patient denies LOC vision changes neck pain back pain numbness tingling nausea vomiting chest pain shortness of breath cough fever chills weakness.  Patient denies pregnancy.  Tetanus needs.  Non-smoker.  Patient complains of headache and mild dizziness.  PCP is alina Bates

## 2024-03-19 NOTE — ED PROVIDER NOTE - PHYSICAL EXAMINATION
Gen: Awake, Alert, WD, WN, NAD  Head:  NC, + abrasion 1-1.5cm right frontoparietal area, no step offs  Eyes:  PERRL, EOMI, Conjunctiva pink, lids normal, no scleral icterus  ENT: OP clear, +torus palatus, moist mucus membranes  Neck: supple, nontender, no meningismus, trachea midline  Gastrointestinal/Abdomen:  Soft, nontender  Pelvis: stable, nontender, Hips: FROM, nontender  Back:  no CVAT, no MLT  Ext:  warm, well perfused, moving all extremities spontaneously, no cyanosis, no erythema, no edema, distal pulses intact  Skin: +scalp abrasion wound observed, superficial, no rash, no vesicles, no petechiae, no ecchymosis  Neuro:  AAOx3, sensation intact, motor 5/5 x 4 extremities, normal gait, speech clear

## 2024-03-19 NOTE — ED PROVIDER NOTE - PATIENT PORTAL LINK FT
You can access the FollowMyHealth Patient Portal offered by [facility] by registering at the following website: [email or web address]. By joining [employer]’s FollowMyHealth portal, you will also be able to view your health information using other applications (apps) compatible with our system.

## 2024-03-19 NOTE — ED ADULT TRIAGE NOTE - CHIEF COMPLAINT QUOTE
tripped on steps and hit head against door, blood noted on pt hair.  No LOC.  c/o dizziness and pain on top of head.

## 2024-03-19 NOTE — ED PROVIDER NOTE - NSFOLLOWUPINSTRUCTIONS_ED_ALL_ED_FT
[employer]® CareNotes®  :  [facility]        ABRASION - AfterCare(R) Instructions(ER/ED)    Abrasion    WHAT YOU NEED TO KNOW:    An abrasion is a wound on your skin. Abrasions usually happen when your skin rubs against a rough surface. Examples of an abrasion include rug burn, a skinned elbow, or road rash. Abrasions can be deep or shallow The wound may hurt, bleed, bruise, or swell.    DISCHARGE INSTRUCTIONS:    Return to the emergency department if:    The bleeding does not stop after 10 minutes of firm pressure.    The redness around your wound begins to spread.    You cannot rinse one or more foreign objects out of your wound.  Call your doctor if:    You have a fever or chills.    Your abrasion is red, warm, swollen, or draining pus.    You have questions or concerns about your condition or care.  Care for your abrasion:    Wash your hands and dry them with a clean towel before first.    Press a clean cloth against your wound for 5 to 10 minutes to stop any bleeding.    Rinse your wound with clean water. Do not use harsh soap, alcohol, or iodine solutions.    Use a clean, wet cloth to remove any objects, such as small pieces of rocks or dirt.    Rub antibiotic ointment on your wound. This may help prevent infection and help your wound heal.    Cover the wound with a non-stick bandage. Change the bandage daily, and if it gets wet or dirty.  Follow up with your doctor as directed: Write down your questions so you remember to ask them during your visits.    © [employer] 1973, 2024 [facility] [employer]® CareNotes®  :  [facility]        ABRASION - AfterCare(R) Instructions(ER/ED)    Abrasion    WHAT YOU NEED TO KNOW:    An abrasion is a wound on your skin. Abrasions usually happen when your skin rubs against a rough surface. Examples of an abrasion include rug burn, a skinned elbow, or road rash. Abrasions can be deep or shallow The wound may hurt, bleed, bruise, or swell.    DISCHARGE INSTRUCTIONS:    Return to the emergency department if:    The bleeding does not stop after 10 minutes of firm pressure.    The redness around your wound begins to spread.    You cannot rinse one or more foreign objects out of your wound.  Call your doctor if:    You have a fever or chills.    Your abrasion is red, warm, swollen, or draining pus.    You have questions or concerns about your condition or care.  Care for your abrasion:    Wash your hands and dry them with a clean towel before first.    Press a clean cloth against your wound for 5 to 10 minutes to stop any bleeding.    Rinse your wound with clean water. Do not use harsh soap, alcohol, or iodine solutions.    Use a clean, wet cloth to remove any objects, such as small pieces of rocks or dirt.    Rub antibiotic ointment on your wound. This may help prevent infection and help your wound heal.    Cover the wound with a non-stick bandage. Change the bandage daily, and if it gets wet or dirty.  Follow up with your doctor as directed: Write down your questions so you remember to ask them during your visits.    © [employer] 1973, 2024    [facility] [employer]id [employer]® CareNotes®  :  [facility]        HEAD INJURY - AfterCare(R) Instructions(ER/ED)    Head Injury    WHAT YOU NEED TO KNOW:    A head injury can include your scalp, face, skull, or brain and range from mild to severe. Effects can appear immediately after the injury or develop later. The effects may last a short time or be permanent. Healthcare providers may want to check your recovery over time. Treatment may change as you recover or develop new health problems from the head injury.    DISCHARGE INSTRUCTIONS:    Call your local emergency number (911 in the [place]), or have someone else call if:    You cannot be woken.    You have a seizure.    You stop responding to others or you faint.    You have blurry or double vision.    Your speech becomes slurred or confused.    You have arm or leg weakness, loss of feeling, or new problems with coordination.    Your pupils are larger than usual, or one pupil is a different size than the other.    You have blood or clear fluid coming out of your ears or nose.  Return to the emergency department if:    You have repeated or forceful vomiting.    You feel confused.    Your headache gets worse or becomes severe.    You or someone caring for you notices that you are harder to wake than usual.  Call your doctor if:    Your symptoms last longer than 6 weeks after the injury.    You have questions or concerns about your condition or care.  Medicines:    Acetaminophen decreases pain and fever. It is available without a doctor's order. Ask how much to take and how often to take it. Follow directions. Read the labels of all other medicines you are using to see if they also contain acetaminophen, or ask your doctor or pharmacist. Acetaminophen can cause liver damage if not taken correctly.    Take your medicine as directed. Contact your healthcare provider if you think your medicine is not helping or if you have side effects. Tell your provider if you are allergic to any medicine. Keep a list of the medicines, vitamins, and herbs you take. Include the amounts, and when and why you take them. Bring the list or the pill bottles to follow-up visits. Carry your medicine list with you in case of an emergency.  Self-care:    Rest or do quiet activities. Limit your time watching TV, using the computer, or doing tasks that require a lot of thinking. Slowly return to your normal activities as directed. Do not play sports or do activities that may cause you to get hit in the head. Ask your healthcare provider when you can return to sports.    Apply ice on your head for 15 to 20 minutes every hour or as directed. Use an ice pack, or put crushed ice in a plastic bag. Cover it with a towel before you apply it to your skin. Ice helps prevent tissue damage and decreases swelling and pain.    Have someone stay with you for 24 hours , or as directed. This person can monitor you for problems and call for help if needed. When you are awake, the person should ask you a few questions every few hours to see if you are thinking clearly. An example is to ask your name or address.  Prevent another head injury:    Wear a helmet that fits properly. Do this when you play sports, or ride a bike, scooter, or skateboard. Helmets help decrease your risk for a serious head injury. Talk to your healthcare provider about other ways you can protect yourself if you play sports.    Wear your seatbelt every time you are in a car. This helps lower your risk for a head injury if you are in a car accident.  Follow up with your doctor as directed: Write down your questions so you remember to ask them during your visits.    © [employer] 1973, 2024    	  Keep wound clean.  Follow up with your personal physician in 1-2 days.  Tylenol 1-2 tablets every 6 hours as needed.  Please return to the Emergency Department immediately for any problems or concerns.

## 2024-03-19 NOTE — ED ADULT NURSE NOTE - OBJECTIVE STATEMENT
Pt c/o s/p fall. Pt states she tripped over her steps and hit her head on the glass door outside. Pt states she feels dizzy and has pain at the site. Pt denies any LOC or the use of anticoagulants. Pt noted to have small laceration on top of head with blood in hair. Site cleaned by MD. Pt given gauze to apply pressure to the area. Pt resting in stretcher. Pt  at bedside.

## 2024-03-19 NOTE — ED PROVIDER NOTE - CARE PROVIDER_API CALL
Maisha Greenberg  Internal Medicine  [address] [place] [address]  Phone: [phone]  Fax: [phone]  Established Patient  Follow Up Time: 1-3 Days

## 2024-03-19 NOTE — ED ADULT NURSE NOTE - NSFALLRISKINTERV_ED_ALL_ED

## 2024-10-16 ENCOUNTER — APPOINTMENT (OUTPATIENT)
Dept: UROLOGY | Facility: CLINIC | Age: 49
End: 2024-10-16

## 2024-10-21 ENCOUNTER — APPOINTMENT (OUTPATIENT)
Dept: UROLOGY | Facility: CLINIC | Age: 49
End: 2024-10-21

## 2025-05-19 ENCOUNTER — NON-APPOINTMENT (OUTPATIENT)
Age: 50
End: 2025-05-19

## 2025-05-21 ENCOUNTER — APPOINTMENT (OUTPATIENT)
Dept: UROLOGY | Facility: CLINIC | Age: 50
End: 2025-05-21
Payer: COMMERCIAL

## 2025-05-21 DIAGNOSIS — Z00.00 ENCOUNTER FOR GENERAL ADULT MEDICAL EXAMINATION W/OUT ABNORMAL FINDINGS: ICD-10-CM

## 2025-05-21 PROCEDURE — 99215 OFFICE O/P EST HI 40 MIN: CPT | Mod: 95

## 2025-05-21 PROCEDURE — 99417 PROLNG OP E/M EACH 15 MIN: CPT | Mod: 95

## 2025-05-21 RX ORDER — NITROFURANTOIN MACROCRYSTALS 100 MG/1
100 CAPSULE ORAL TWICE DAILY
Qty: 20 | Refills: 0 | Status: ACTIVE | COMMUNITY
Start: 2025-05-21 | End: 1900-01-01

## 2025-05-30 ENCOUNTER — APPOINTMENT (OUTPATIENT)
Dept: UROLOGY | Facility: CLINIC | Age: 50
End: 2025-05-30
Payer: COMMERCIAL

## 2025-05-30 DIAGNOSIS — N12 TUBULO-INTERSTITIAL NEPHRITIS, NOT SPECIFIED AS ACUTE OR CHRONIC: ICD-10-CM

## 2025-05-30 DIAGNOSIS — R39.89 OTHER SYMPTOMS AND SIGNS INVOLVING THE GENITOURINARY SYSTEM: ICD-10-CM

## 2025-05-30 DIAGNOSIS — N39.0 URINARY TRACT INFECTION, SITE NOT SPECIFIED: ICD-10-CM

## 2025-05-30 DIAGNOSIS — R10.9 UNSPECIFIED ABDOMINAL PAIN: ICD-10-CM

## 2025-05-30 DIAGNOSIS — R31.29 OTHER MICROSCOPIC HEMATURIA: ICD-10-CM

## 2025-05-30 DIAGNOSIS — R53.81 OTHER MALAISE: ICD-10-CM

## 2025-05-30 LAB
APPEARANCE: CLEAR
BACTERIA UR CULT: NORMAL
BACTERIA: NEGATIVE /HPF
BILIRUBIN URINE: NEGATIVE
BLOOD URINE: NEGATIVE
CAST: 0 /LPF
COLOR: YELLOW
EPITHELIAL CELLS: 1 /HPF
GLUCOSE QUALITATIVE U: NEGATIVE MG/DL
KETONES URINE: NEGATIVE MG/DL
LEUKOCYTE ESTERASE URINE: ABNORMAL
MICROSCOPIC-UA: NORMAL
NITRITE URINE: NEGATIVE
PH URINE: 7
PROTEIN URINE: NEGATIVE MG/DL
RED BLOOD CELLS URINE: 0 /HPF
SPECIFIC GRAVITY URINE: 1.01
URINE CYTOLOGY: NORMAL
UROBILINOGEN URINE: 0.2 MG/DL
WHITE BLOOD CELLS URINE: 1 /HPF

## 2025-05-30 PROCEDURE — 99215 OFFICE O/P EST HI 40 MIN: CPT | Mod: 95

## 2025-08-05 ENCOUNTER — APPOINTMENT (OUTPATIENT)
Dept: UROLOGY | Facility: CLINIC | Age: 50
End: 2025-08-05